# Patient Record
Sex: FEMALE | Race: WHITE | NOT HISPANIC OR LATINO | Employment: OTHER | URBAN - NONMETROPOLITAN AREA
[De-identification: names, ages, dates, MRNs, and addresses within clinical notes are randomized per-mention and may not be internally consistent; named-entity substitution may affect disease eponyms.]

---

## 2023-05-08 ENCOUNTER — OFFICE VISIT (OUTPATIENT)
Dept: PRIMARY CARE | Facility: CLINIC | Age: 88
End: 2023-05-08
Payer: MEDICARE

## 2023-05-08 VITALS
SYSTOLIC BLOOD PRESSURE: 124 MMHG | WEIGHT: 191 LBS | HEART RATE: 78 BPM | BODY MASS INDEX: 29.04 KG/M2 | OXYGEN SATURATION: 97 % | DIASTOLIC BLOOD PRESSURE: 82 MMHG

## 2023-05-08 DIAGNOSIS — M48.061 SPINAL STENOSIS OF LUMBAR REGION WITHOUT NEUROGENIC CLAUDICATION: ICD-10-CM

## 2023-05-08 DIAGNOSIS — G60.9 IDIOPATHIC PERIPHERAL NEUROPATHY: ICD-10-CM

## 2023-05-08 DIAGNOSIS — I05.1 RHEUMATIC MITRAL REGURGITATION: ICD-10-CM

## 2023-05-08 DIAGNOSIS — G45.9 TIA (TRANSIENT ISCHEMIC ATTACK): ICD-10-CM

## 2023-05-08 DIAGNOSIS — D64.9 ANEMIA, UNSPECIFIED TYPE: ICD-10-CM

## 2023-05-08 DIAGNOSIS — I10 BENIGN ESSENTIAL HYPERTENSION: ICD-10-CM

## 2023-05-08 DIAGNOSIS — I25.10 CORONARY ARTERY DISEASE INVOLVING NATIVE CORONARY ARTERY OF NATIVE HEART WITHOUT ANGINA PECTORIS: ICD-10-CM

## 2023-05-08 DIAGNOSIS — I48.0 PAROXYSMAL ATRIAL FIBRILLATION (MULTI): ICD-10-CM

## 2023-05-08 DIAGNOSIS — K21.9 GASTROESOPHAGEAL REFLUX DISEASE WITHOUT ESOPHAGITIS: ICD-10-CM

## 2023-05-08 DIAGNOSIS — M54.12 CERVICAL RADICULAR PAIN: ICD-10-CM

## 2023-05-08 DIAGNOSIS — M79.674 PAIN OF TOE OF RIGHT FOOT: ICD-10-CM

## 2023-05-08 DIAGNOSIS — E11.9 DIABETES MELLITUS TYPE 2, NONINSULIN DEPENDENT (MULTI): Primary | ICD-10-CM

## 2023-05-08 PROBLEM — I34.0 MITRAL REGURGITATION: Status: ACTIVE | Noted: 2023-05-08

## 2023-05-08 PROBLEM — J30.9 ALLERGIC RHINITIS: Status: ACTIVE | Noted: 2023-05-08

## 2023-05-08 PROBLEM — R55 SYNCOPE: Status: RESOLVED | Noted: 2023-05-08 | Resolved: 2023-05-08

## 2023-05-08 PROBLEM — L84 HARD CORN: Status: RESOLVED | Noted: 2023-05-08 | Resolved: 2023-05-08

## 2023-05-08 PROBLEM — M17.11 ARTHRITIS OF KNEE, RIGHT: Status: ACTIVE | Noted: 2023-05-08

## 2023-05-08 PROBLEM — I74.2: Status: RESOLVED | Noted: 2023-05-08 | Resolved: 2023-05-08

## 2023-05-08 PROBLEM — N39.0 URINARY TRACT INFECTION: Status: RESOLVED | Noted: 2023-05-08 | Resolved: 2023-05-08

## 2023-05-08 PROBLEM — M47.816 DEGENERATIVE JOINT DISEASE (DJD) OF LUMBAR SPINE: Status: ACTIVE | Noted: 2023-05-08

## 2023-05-08 PROBLEM — B02.9 SHINGLES: Status: RESOLVED | Noted: 2023-05-08 | Resolved: 2023-05-08

## 2023-05-08 PROBLEM — Z20.822 SUSPECTED COVID-19 VIRUS INFECTION: Status: RESOLVED | Noted: 2023-05-08 | Resolved: 2023-05-08

## 2023-05-08 PROBLEM — M19.012 PRIMARY OSTEOARTHRITIS OF LEFT SHOULDER: Status: ACTIVE | Noted: 2023-05-08

## 2023-05-08 PROBLEM — M54.50 LUMBAR PAIN: Status: ACTIVE | Noted: 2023-05-08

## 2023-05-08 PROBLEM — G62.9 PERIPHERAL NEUROPATHY: Status: ACTIVE | Noted: 2023-05-08

## 2023-05-08 PROBLEM — R32 URINARY INCONTINENCE: Status: ACTIVE | Noted: 2023-05-08

## 2023-05-08 PROBLEM — E83.51 HYPOCALCEMIA: Status: ACTIVE | Noted: 2023-05-08

## 2023-05-08 PROBLEM — R73.09 ELEVATED GLUCOSE: Status: ACTIVE | Noted: 2023-05-08

## 2023-05-08 LAB
ALBUMIN (G/DL) IN SER/PLAS: 3.6 G/DL (ref 3.4–5)
ANION GAP IN SER/PLAS: 13 MMOL/L (ref 10–20)
CALCIUM (MG/DL) IN SER/PLAS: 8.9 MG/DL (ref 8.6–10.3)
CARBON DIOXIDE, TOTAL (MMOL/L) IN SER/PLAS: 29 MMOL/L (ref 21–32)
CHLORIDE (MMOL/L) IN SER/PLAS: 101 MMOL/L (ref 98–107)
CHOLESTEROL (MG/DL) IN SER/PLAS: 99 MG/DL (ref 0–199)
CHOLESTEROL IN HDL (MG/DL) IN SER/PLAS: 46.7 MG/DL
CHOLESTEROL/HDL RATIO: 2.1
CREATININE (MG/DL) IN SER/PLAS: 1.23 MG/DL (ref 0.5–1.05)
GFR FEMALE: 41 ML/MIN/1.73M2
GLUCOSE (MG/DL) IN SER/PLAS: 222 MG/DL (ref 74–99)
LDL: 18 MG/DL (ref 0–99)
PHOSPHATE (MG/DL) IN SER/PLAS: 3.9 MG/DL (ref 2.5–4.9)
POC HEMOGLOBIN A1C: 8.8 % (ref 4.2–6.5)
POTASSIUM (MMOL/L) IN SER/PLAS: 4.7 MMOL/L (ref 3.5–5.3)
SODIUM (MMOL/L) IN SER/PLAS: 138 MMOL/L (ref 136–145)
TRIGLYCERIDE (MG/DL) IN SER/PLAS: 172 MG/DL (ref 0–149)
UREA NITROGEN (MG/DL) IN SER/PLAS: 27 MG/DL (ref 6–23)
VLDL: 34 MG/DL (ref 0–40)

## 2023-05-08 PROCEDURE — 1160F RVW MEDS BY RX/DR IN RCRD: CPT | Performed by: FAMILY MEDICINE

## 2023-05-08 PROCEDURE — G0439 PPPS, SUBSEQ VISIT: HCPCS | Performed by: FAMILY MEDICINE

## 2023-05-08 PROCEDURE — 3079F DIAST BP 80-89 MM HG: CPT | Performed by: FAMILY MEDICINE

## 2023-05-08 PROCEDURE — 99214 OFFICE O/P EST MOD 30 MIN: CPT | Performed by: FAMILY MEDICINE

## 2023-05-08 PROCEDURE — 82570 ASSAY OF URINE CREATININE: CPT

## 2023-05-08 PROCEDURE — 1036F TOBACCO NON-USER: CPT | Performed by: FAMILY MEDICINE

## 2023-05-08 PROCEDURE — 80061 LIPID PANEL: CPT

## 2023-05-08 PROCEDURE — 1157F ADVNC CARE PLAN IN RCRD: CPT | Performed by: FAMILY MEDICINE

## 2023-05-08 PROCEDURE — 82043 UR ALBUMIN QUANTITATIVE: CPT

## 2023-05-08 PROCEDURE — 1159F MED LIST DOCD IN RCRD: CPT | Performed by: FAMILY MEDICINE

## 2023-05-08 PROCEDURE — 1170F FXNL STATUS ASSESSED: CPT | Performed by: FAMILY MEDICINE

## 2023-05-08 PROCEDURE — 3074F SYST BP LT 130 MM HG: CPT | Performed by: FAMILY MEDICINE

## 2023-05-08 PROCEDURE — 83036 HEMOGLOBIN GLYCOSYLATED A1C: CPT | Performed by: FAMILY MEDICINE

## 2023-05-08 PROCEDURE — 80069 RENAL FUNCTION PANEL: CPT

## 2023-05-08 PROCEDURE — 82607 VITAMIN B-12: CPT

## 2023-05-08 PROCEDURE — 84550 ASSAY OF BLOOD/URIC ACID: CPT

## 2023-05-08 RX ORDER — CLOPIDOGREL BISULFATE 75 MG/1
1 TABLET ORAL DAILY
COMMUNITY
Start: 2014-05-27 | End: 2023-06-02 | Stop reason: SDUPTHER

## 2023-05-08 RX ORDER — ACETAMINOPHEN 500 MG
2 TABLET ORAL 2 TIMES DAILY
COMMUNITY
Start: 2022-06-25

## 2023-05-08 RX ORDER — GABAPENTIN 300 MG/1
1 CAPSULE ORAL EVERY 6 HOURS
COMMUNITY
Start: 2012-04-02 | End: 2023-07-12

## 2023-05-08 RX ORDER — CHOLECALCIFEROL (VITAMIN D3) 50 MCG
1 TABLET ORAL DAILY
COMMUNITY

## 2023-05-08 RX ORDER — ROSUVASTATIN CALCIUM 10 MG/1
1 TABLET, COATED ORAL NIGHTLY
COMMUNITY
Start: 2019-04-18 | End: 2023-05-30

## 2023-05-08 RX ORDER — NITROGLYCERIN 0.4 MG/1
1 TABLET SUBLINGUAL EVERY 5 MIN PRN
COMMUNITY
End: 2023-05-08 | Stop reason: SDUPTHER

## 2023-05-08 RX ORDER — NITROGLYCERIN 0.4 MG/1
0.4 TABLET SUBLINGUAL EVERY 5 MIN PRN
Qty: 90 TABLET | Refills: 1 | Status: SHIPPED | OUTPATIENT
Start: 2023-05-08 | End: 2023-05-23

## 2023-05-08 RX ORDER — CARVEDILOL 3.12 MG/1
1 TABLET ORAL
COMMUNITY
Start: 2011-11-08 | End: 2023-06-02 | Stop reason: SDUPTHER

## 2023-05-08 RX ORDER — NYSTATIN 100000 U/G
CREAM TOPICAL AS NEEDED
COMMUNITY

## 2023-05-08 RX ORDER — APIXABAN 5 MG/1
1 TABLET, FILM COATED ORAL EVERY 12 HOURS
COMMUNITY
Start: 2020-08-03 | End: 2023-05-23

## 2023-05-08 ASSESSMENT — PATIENT HEALTH QUESTIONNAIRE - PHQ9
2. FEELING DOWN, DEPRESSED OR HOPELESS: NOT AT ALL
SUM OF ALL RESPONSES TO PHQ9 QUESTIONS 1 AND 2: 0
1. LITTLE INTEREST OR PLEASURE IN DOING THINGS: NOT AT ALL

## 2023-05-08 ASSESSMENT — ACTIVITIES OF DAILY LIVING (ADL)
TAKING_MEDICATION: INDEPENDENT
DRESSING: INDEPENDENT
MANAGING_FINANCES: INDEPENDENT
DOING_HOUSEWORK: NEEDS ASSISTANCE
BATHING: INDEPENDENT
GROCERY_SHOPPING: NEEDS ASSISTANCE

## 2023-05-08 NOTE — PROGRESS NOTES
Subjective   Reason for Visit: Andreia Camejo is an 89 y.o. female here for a Medicare Wellness visit.     Past Medical, Surgical, and Family History reviewed and updated in chart.    Reviewed all medications by prescribing practitioner or clinical pharmacist (such as prescriptions, OTCs, herbal therapies and supplements) and documented in the medical record.    HPI for 6 month check up and wellness  Toe issue right great toe  Often has night pain  Is not the same every single night  Does not notice it during the day  For the most part feeling well other than usual pains particularly in back  He is not able to perform any activities  No chest pain pressure palpitations  Has not used nitroglycerin of note her nitroglycerin are all   Discussed and encouraged to obtain a new bottle to have in case    Patient Care Team:  Errol Murphy DO as PCP - General  Errol Murphy DO as PCP - [facility] Attributed Provider   R great toe pain at HS  Better day     Review of Systems    Objective   Vitals:  /82   Pulse 78   Wt 86.6 kg (191 lb)   SpO2 97%   BMI 29.04 kg/m²       Physical Exam  Cardiovascular:      Pulses:           Dorsalis pedis pulses are 1+ on the right side and 1+ on the left side.        Posterior tibial pulses are 1+ on the right side and 1+ on the left side.   Musculoskeletal:      Right foot: Normal range of motion. No foot drop or prominent metatarsal heads.      Left foot: Normal range of motion. No foot drop or prominent metatarsal heads.   Feet:      Right foot:      Protective Sensation: 6 sites tested.  4 sites sensed.      Skin integrity: Callus present. No ulcer, blister or skin breakdown.      Toenail Condition: Right toenails are abnormally thick.      Left foot:      Protective Sensation: 6 sites tested.  3 sites sensed.      Skin integrity: Callus present. No ulcer, blister or skin breakdown.      Toenail Condition: Left toenails are abnormally thick.       General: alert, no  apparent distress, good hygiene, wheeled walker used to get her in and out of the room  HEAD: Normocephalic, atraumatic   EARS: EAC patent, TMs normal,   EYES: sclera white, MAULIK, conjunctiva noninjected  NOSE/MOUTH: grade 3 airway  Neck: supple, no masses, thyroid non enlarged non nodular, no cervical adenopathy,  Lungs: no wheezing, no rales , no rhonchi, normal respiratory pattern, breath sounds not diminished  Heart: regular rate and rhythm, 3/6 RUSB murmur and 2/6 apex, no ectopy, no S3 or S4, no carotid bruits  abdomen: soft NT,BS + , no organomegaly, no masses, no bruits  Extremities: trace edema, no cyanosis, no clubbing, 1+ posterior tibialis pulse  Plantar aspect right foot she has 2-3  hard corns around base second- third toes metatarsal head area, bony prominence palpated and foot in this are     Assessment/Plan   Problem List Items Addressed This Visit       Anemia    Benign essential hypertension    Cervical radicular pain    Coronary artery disease    Relevant Medications    carvedilol (Coreg) 3.125 mg tablet    clopidogrel (Plavix) 75 mg tablet    nitroglycerin (Nitrostat) 0.4 mg SL tablet    Esophageal reflux    Mitral regurgitation    Relevant Medications    carvedilol (Coreg) 3.125 mg tablet    clopidogrel (Plavix) 75 mg tablet    nitroglycerin (Nitrostat) 0.4 mg SL tablet    Paroxysmal atrial fibrillation (CMS/HCC)    Relevant Medications    carvedilol (Coreg) 3.125 mg tablet    clopidogrel (Plavix) 75 mg tablet    nitroglycerin (Nitrostat) 0.4 mg SL tablet    Peripheral neuropathy    Relevant Orders    Vitamin B12    Spinal stenosis, lumbar    TIA (transient ischemic attack)     Other Visit Diagnoses       Diabetes mellitus type 2, noninsulin dependent (CMS/HCC)    -  Primary    Relevant Orders    POCT glycosylated hemoglobin (Hb A1C) manually resulted (Completed)    Renal Function Panel (Completed)    Lipid Panel (Completed)    Albumin , Urine Random    Pain of toe of right foot         Relevant Orders    Uric Acid        We will wait lab work to see where hemoglobin A1c is,  May need to reconsider starting very low-dose insulin she had used Lantus in the past.  Possible use of oral agent uncertain how patient's medication coverages.  Significant CKD could consider SGLT2 inhibitor

## 2023-05-09 LAB
ALBUMIN (MG/L) IN URINE: 33.9 MG/L
ALBUMIN/CREATININE (UG/MG) IN URINE: 37.2 UG/MG CRT (ref 0–30)
CREATININE (MG/DL) IN URINE: 91.2 MG/DL (ref 20–320)
URATE (MG/DL) IN SER/PLAS: 5.1 MG/DL (ref 2.3–6.7)

## 2023-05-11 LAB — COBALAMIN (VITAMIN B12) (PG/ML) IN SER/PLAS: 314 PG/ML (ref 211–911)

## 2023-05-22 DIAGNOSIS — I25.10 CORONARY ARTERY DISEASE INVOLVING NATIVE CORONARY ARTERY OF NATIVE HEART WITHOUT ANGINA PECTORIS: ICD-10-CM

## 2023-05-23 DIAGNOSIS — I48.0 PAROXYSMAL ATRIAL FIBRILLATION (MULTI): Primary | ICD-10-CM

## 2023-05-23 RX ORDER — NITROGLYCERIN 0.4 MG/1
TABLET SUBLINGUAL
Qty: 100 TABLET | Refills: 1 | Status: SHIPPED | OUTPATIENT
Start: 2023-05-23 | End: 2023-06-22

## 2023-05-30 DIAGNOSIS — I25.10 CORONARY ARTERY DISEASE INVOLVING NATIVE CORONARY ARTERY OF NATIVE HEART WITHOUT ANGINA PECTORIS: Primary | ICD-10-CM

## 2023-05-30 RX ORDER — ROSUVASTATIN CALCIUM 10 MG/1
TABLET, COATED ORAL
Qty: 90 TABLET | Refills: 0 | Status: SHIPPED | OUTPATIENT
Start: 2023-05-30 | End: 2023-11-15 | Stop reason: SDUPTHER

## 2023-06-02 DIAGNOSIS — I25.10 CORONARY ARTERY DISEASE INVOLVING NATIVE CORONARY ARTERY OF NATIVE HEART WITHOUT ANGINA PECTORIS: Primary | ICD-10-CM

## 2023-06-02 RX ORDER — CARVEDILOL 3.12 MG/1
TABLET ORAL
Qty: 180 TABLET | Refills: 1 | Status: SHIPPED | OUTPATIENT
Start: 2023-06-02 | End: 2023-11-15 | Stop reason: SDUPTHER

## 2023-06-02 RX ORDER — CLOPIDOGREL BISULFATE 75 MG/1
75 TABLET ORAL DAILY
Qty: 90 TABLET | Refills: 1 | Status: SHIPPED | OUTPATIENT
Start: 2023-06-02 | End: 2023-11-15 | Stop reason: SDUPTHER

## 2023-07-12 DIAGNOSIS — G60.9 IDIOPATHIC PERIPHERAL NEUROPATHY: ICD-10-CM

## 2023-07-12 DIAGNOSIS — M54.12 CERVICAL RADICULAR PAIN: Primary | ICD-10-CM

## 2023-07-12 RX ORDER — GABAPENTIN 300 MG/1
CAPSULE ORAL
Qty: 360 CAPSULE | Refills: 1 | Status: SHIPPED | OUTPATIENT
Start: 2023-07-12 | End: 2023-11-15 | Stop reason: SDUPTHER

## 2023-08-30 ENCOUNTER — PATIENT OUTREACH (OUTPATIENT)
Dept: CARE COORDINATION | Facility: CLINIC | Age: 88
End: 2023-08-30
Payer: MEDICARE

## 2023-08-30 SDOH — ECONOMIC STABILITY: FOOD INSECURITY
ARE ANY OF YOUR NEEDS URGENT? FOR EXAMPLE, UNCERTAINTY OF WHERE YOU WILL GET YOUR NEXT MEAL OR NOT HAVING THE MEDICATIONS YOU NEED TO TAKE TOMORROW.: NO

## 2023-08-30 SDOH — ECONOMIC STABILITY: GENERAL: WOULD YOU LIKE HELP WITH ANY OF THE FOLLOWING NEEDS?: I DONT NEED HELP WITH ANY OF THESE

## 2023-08-30 ASSESSMENT — ENCOUNTER SYMPTOMS
LOSS OF SENSATION IN FEET: 0
DEPRESSION: 0
OCCASIONAL FEELINGS OF UNSTEADINESS: 0

## 2023-08-30 NOTE — PROGRESS NOTES
Outreach call to patient to support a smooth transition of care from recent admission.  Spoke with patient, reviewed discharge medications, discharge instructions, assessed social needs, and provided education on importance of follow-up appointment with provider. Will continue to monitor through transition period.     Engagement  Call Start Time: 1045 (8/30/2023 10:59 AM)    Medications  Medications reviewed with patient/caregiver?: Yes (8/30/2023 10:59 AM)  Is the patient having any side effects they believe may be caused by any medication additions or changes?: No (8/30/2023 10:59 AM)  Does the patient have all medications ordered at discharge?: Yes (8/30/2023 10:59 AM)  Care Management Interventions: Provided patient education (8/30/2023 10:59 AM)  Is the patient taking all medications as directed (includes completed medication regime)?: Yes (8/30/2023 10:59 AM)  Care Management Interventions: Provided patient education (8/30/2023 10:59 AM)    Appointments  Does the patient have a primary care provider?: Yes (8/30/2023 10:59 AM)  Care Management Interventions: Verified appointment date/time/provider (8/30/2023 10:59 AM)  Has the patient kept scheduled appointments due by today?: Yes (8/30/2023 10:59 AM)  Care Management Interventions: Advised patient to keep appointment (8/30/2023 10:59 AM)    Self Management  What is the home health agency?: [facility] (8/30/2023 10:59 AM)  Has home health visited the patient within 72 hours of discharge?: Yes (8/30/2023 10:59 AM)    Patient Teaching  Does the patient have access to their discharge instructions?: Yes (8/30/2023 10:59 AM)  Care Management Interventions: Reviewed instructions with patient (Diet:  · Diet resume normal diet    Labs 1:  · Lab Test(s) Basic Metabolic Panel  · Date To Be Drawn recheck in 1 week) (8/30/2023 10:59 AM)  What is the patient's perception of their health status since discharge?: Improving (8/30/2023 10:59 AM)  Is the patient/caregiver able to  teach back the hierarchy of who to call/visit for symptoms/problems? PCP, Specialist, Home Health nurse, Urgent Care, ED, 911: Yes (Patient's cousin is an RN, is at home with patient.) (8/30/2023 10:59 AM)      LORI

## 2023-09-07 ENCOUNTER — OFFICE VISIT (OUTPATIENT)
Dept: PRIMARY CARE | Facility: CLINIC | Age: 88
End: 2023-09-07
Payer: MEDICARE

## 2023-09-07 VITALS
HEART RATE: 61 BPM | WEIGHT: 187 LBS | TEMPERATURE: 96.2 F | DIASTOLIC BLOOD PRESSURE: 67 MMHG | BODY MASS INDEX: 28.43 KG/M2 | SYSTOLIC BLOOD PRESSURE: 136 MMHG | OXYGEN SATURATION: 98 %

## 2023-09-07 DIAGNOSIS — R40.4 TRANSIENT ALTERATION OF AWARENESS: Primary | ICD-10-CM

## 2023-09-07 DIAGNOSIS — N39.0 URINARY TRACT INFECTION WITHOUT HEMATURIA, SITE UNSPECIFIED: ICD-10-CM

## 2023-09-07 DIAGNOSIS — H61.22 IMPACTED CERUMEN OF LEFT EAR: ICD-10-CM

## 2023-09-07 DIAGNOSIS — N30.00 ACUTE CYSTITIS WITHOUT HEMATURIA: ICD-10-CM

## 2023-09-07 LAB
POC APPEARANCE, URINE: CLEAR
POC BILIRUBIN, URINE: NEGATIVE
POC BLOOD, URINE: NEGATIVE
POC COLOR, URINE: YELLOW
POC GLUCOSE, URINE: NEGATIVE MG/DL
POC KETONES, URINE: NEGATIVE MG/DL
POC LEUKOCYTES, URINE: NEGATIVE
POC NITRITE,URINE: NEGATIVE
POC PH, URINE: 7 PH
POC PROTEIN, URINE: NEGATIVE MG/DL
POC SPECIFIC GRAVITY, URINE: 1.01
POC UROBILINOGEN, URINE: 0.2 EU/DL

## 2023-09-07 PROCEDURE — 1036F TOBACCO NON-USER: CPT | Performed by: FAMILY MEDICINE

## 2023-09-07 PROCEDURE — 3075F SYST BP GE 130 - 139MM HG: CPT | Performed by: FAMILY MEDICINE

## 2023-09-07 PROCEDURE — 99214 OFFICE O/P EST MOD 30 MIN: CPT | Performed by: FAMILY MEDICINE

## 2023-09-07 PROCEDURE — 1160F RVW MEDS BY RX/DR IN RCRD: CPT | Performed by: FAMILY MEDICINE

## 2023-09-07 PROCEDURE — 1159F MED LIST DOCD IN RCRD: CPT | Performed by: FAMILY MEDICINE

## 2023-09-07 PROCEDURE — 1157F ADVNC CARE PLAN IN RCRD: CPT | Performed by: FAMILY MEDICINE

## 2023-09-07 PROCEDURE — 3078F DIAST BP <80 MM HG: CPT | Performed by: FAMILY MEDICINE

## 2023-09-07 PROCEDURE — 81003 URINALYSIS AUTO W/O SCOPE: CPT | Performed by: FAMILY MEDICINE

## 2023-09-07 ASSESSMENT — PATIENT HEALTH QUESTIONNAIRE - PHQ9
1. LITTLE INTEREST OR PLEASURE IN DOING THINGS: NOT AT ALL
SUM OF ALL RESPONSES TO PHQ9 QUESTIONS 1 AND 2: 0
2. FEELING DOWN, DEPRESSED OR HOPELESS: NOT AT ALL

## 2023-09-07 NOTE — PROGRESS NOTES
Subjective   Patient ID: Andreia Camejo is a 89 y.o. female who presents for Hospital Follow-up (In UTI and TIA, per Pt feels ok, except dizziness).    HPI   Patient's daughter came over apparently she was in the shower cleaning  Was very lethargic not herself  [place] to be from UTI  Was light headed almost passed out EMT called failed stroke test taken to er in hospital 2 nights  Tx UTI   Having vertigo issues since yesterday  Has had this problem before  Does have hearing loss left ear more so than right  Feels back to mentation level she was prior per patient and daughter  He is starting home physical therapy  He is somewhat weaker    Review of Systems    Objective   /67   Pulse 61   Temp 35.7 °C (96.2 °F)   Wt 84.8 kg (187 lb)   SpO2 98%   BMI 28.43 kg/m²     Physical Exam  General: alert, no apparent distress, good hygiene   HEAD:  Normocephalic, atraumatic    EARS:  EAC left 100% occluded with cerumen deep in canal by TM, TMs normal post lavage,   EYES:  sclera white, MAULIK, conjunctiva noninjected  NOSE: Nasal passages patent   MOUTH: Pharynx clear, tongue uvula midline, grade 3 airway  Neck:  supple, no masses, thyroid non enlarged non nodular, no cervical adenopathy,  Lungs:  no wheezing, no rales , no rhonchi, normal respiratory pattern, breath sounds not diminished  Heart:  regular rate and  rhythm, 3/6 RUSB murmur, no ectopy, no S3 or S4, no carotid bruits  Abdomen:  soft NT,BS + ,  no organomegaly, no masses, no bruits  Extremities:  no edema, no cyanosis, no clubbing,  2+ posterior tibialis pulse    Psych:  speech fluent, normal affect, normal thought process  Skin:  no rashes, no concerning skin lesions, normal texture    Assessment/Plan   Problem List Items Addressed This Visit       Impacted cerumen of left ear    Transient alteration of awareness - Primary    Urinary tract infection without hematuria    Relevant Orders    POCT UA Automated manually resulted (Completed)    POCT UA  (nonautomated) manually resulted   Hospitals or records were reviewed as far as CT of brain lab work urine culture.  She is uncertain what antibiotic she took felt it started with then a concern possible amoxicillin.  Urinalysis checked is totally clear today,  Discussed with daughter and patient urinary tract infection can sometimes cause mental confusion most likely not a TIA however patient is already on Eliquis and clopidogrel basic treatment for GI if it occurred.  They definitely prefer not to do MRI particularly if it have to be downtown.  Doubt it would change treatment  We will continue medicines as is.  Complete physical therapy/  He has appointment with spine doctor at the end of the month she is having neck issues.  She feels vertigo/neck issues are related.  Most likely this is BPV.  Discussed vertiginous adaption exercises  Discussed need for follow-up if it does not slowly improve.  Hearing improved significantly after ear lavage.

## 2023-09-21 ENCOUNTER — PATIENT OUTREACH (OUTPATIENT)
Dept: CARE COORDINATION | Facility: CLINIC | Age: 88
End: 2023-09-21
Payer: MEDICARE

## 2023-09-25 PROCEDURE — G0180 MD CERTIFICATION HHA PATIENT: HCPCS | Performed by: FAMILY MEDICINE

## 2023-10-02 ENCOUNTER — PATIENT OUTREACH (OUTPATIENT)
Dept: CARE COORDINATION | Facility: CLINIC | Age: 88
End: 2023-10-02
Payer: MEDICARE

## 2023-10-02 NOTE — PROGRESS NOTES
Outreach call to patient to check in 30 days after hospital discharge to support smooth transition of care.  Patient with no additional needs noted. No additional outreach needed at this time.    Patient has a follow up with Dr. Murphy confirmed appointment and has transportation arranged  for 11/15/2023.      LORI

## 2023-11-15 ENCOUNTER — OFFICE VISIT (OUTPATIENT)
Dept: PRIMARY CARE | Facility: CLINIC | Age: 88
End: 2023-11-15
Payer: MEDICARE

## 2023-11-15 VITALS
OXYGEN SATURATION: 97 % | BODY MASS INDEX: 28.43 KG/M2 | WEIGHT: 187 LBS | SYSTOLIC BLOOD PRESSURE: 141 MMHG | HEART RATE: 62 BPM | DIASTOLIC BLOOD PRESSURE: 82 MMHG

## 2023-11-15 DIAGNOSIS — I10 BENIGN ESSENTIAL HYPERTENSION: Primary | ICD-10-CM

## 2023-11-15 DIAGNOSIS — D69.6 THROMBOCYTOPENIA (CMS-HCC): ICD-10-CM

## 2023-11-15 DIAGNOSIS — M54.12 CERVICAL RADICULAR PAIN: ICD-10-CM

## 2023-11-15 DIAGNOSIS — E11.9 DIABETES MELLITUS TYPE 2 WITHOUT RETINOPATHY (MULTI): ICD-10-CM

## 2023-11-15 DIAGNOSIS — I48.0 PAROXYSMAL ATRIAL FIBRILLATION (MULTI): ICD-10-CM

## 2023-11-15 DIAGNOSIS — G60.9 IDIOPATHIC PERIPHERAL NEUROPATHY: ICD-10-CM

## 2023-11-15 DIAGNOSIS — I25.10 CORONARY ARTERY DISEASE INVOLVING NATIVE CORONARY ARTERY OF NATIVE HEART WITHOUT ANGINA PECTORIS: ICD-10-CM

## 2023-11-15 LAB
ALBUMIN SERPL BCP-MCNC: 4 G/DL (ref 3.4–5)
ANION GAP SERPL CALC-SCNC: 12 MMOL/L (ref 10–20)
BASOPHILS # BLD AUTO: 0.05 X10*3/UL (ref 0–0.1)
BASOPHILS NFR BLD AUTO: 0.7 %
BUN SERPL-MCNC: 30 MG/DL (ref 6–23)
CALCIUM SERPL-MCNC: 8.7 MG/DL (ref 8.6–10.3)
CHLORIDE SERPL-SCNC: 101 MMOL/L (ref 98–107)
CO2 SERPL-SCNC: 30 MMOL/L (ref 21–32)
CREAT SERPL-MCNC: 1.22 MG/DL (ref 0.5–1.05)
EOSINOPHIL # BLD AUTO: 0.11 X10*3/UL (ref 0–0.4)
EOSINOPHIL NFR BLD AUTO: 1.5 %
ERYTHROCYTE [DISTWIDTH] IN BLOOD BY AUTOMATED COUNT: 12.5 % (ref 11.5–14.5)
GFR SERPL CREATININE-BSD FRML MDRD: 41 ML/MIN/1.73M*2
GLUCOSE SERPL-MCNC: 161 MG/DL (ref 74–99)
HCT VFR BLD AUTO: 41.8 % (ref 36–46)
HGB BLD-MCNC: 13.6 G/DL (ref 12–16)
IMM GRANULOCYTES # BLD AUTO: 0.02 X10*3/UL (ref 0–0.5)
IMM GRANULOCYTES NFR BLD AUTO: 0.3 % (ref 0–0.9)
LYMPHOCYTES # BLD AUTO: 1.52 X10*3/UL (ref 0.8–3)
LYMPHOCYTES NFR BLD AUTO: 20.6 %
MCH RBC QN AUTO: 33.3 PG (ref 26–34)
MCHC RBC AUTO-ENTMCNC: 32.5 G/DL (ref 32–36)
MCV RBC AUTO: 102 FL (ref 80–100)
MONOCYTES # BLD AUTO: 1.02 X10*3/UL (ref 0.05–0.8)
MONOCYTES NFR BLD AUTO: 13.8 %
NEUTROPHILS # BLD AUTO: 4.65 X10*3/UL (ref 1.6–5.5)
NEUTROPHILS NFR BLD AUTO: 63.1 %
NRBC BLD-RTO: 0 /100 WBCS (ref 0–0)
PHOSPHATE SERPL-MCNC: 3.9 MG/DL (ref 2.5–4.9)
PLATELET # BLD AUTO: 155 X10*3/UL (ref 150–450)
POC HEMOGLOBIN A1C: 8.3 % (ref 4.2–6.5)
POTASSIUM SERPL-SCNC: 5.4 MMOL/L (ref 3.5–5.3)
RBC # BLD AUTO: 4.09 X10*6/UL (ref 4–5.2)
SODIUM SERPL-SCNC: 138 MMOL/L (ref 136–145)
WBC # BLD AUTO: 7.4 X10*3/UL (ref 4.4–11.3)

## 2023-11-15 PROCEDURE — 36415 COLL VENOUS BLD VENIPUNCTURE: CPT

## 2023-11-15 PROCEDURE — 3077F SYST BP >= 140 MM HG: CPT | Performed by: FAMILY MEDICINE

## 2023-11-15 PROCEDURE — 1160F RVW MEDS BY RX/DR IN RCRD: CPT | Performed by: FAMILY MEDICINE

## 2023-11-15 PROCEDURE — 3079F DIAST BP 80-89 MM HG: CPT | Performed by: FAMILY MEDICINE

## 2023-11-15 PROCEDURE — 99214 OFFICE O/P EST MOD 30 MIN: CPT | Performed by: FAMILY MEDICINE

## 2023-11-15 PROCEDURE — 83036 HEMOGLOBIN GLYCOSYLATED A1C: CPT | Performed by: FAMILY MEDICINE

## 2023-11-15 PROCEDURE — 1159F MED LIST DOCD IN RCRD: CPT | Performed by: FAMILY MEDICINE

## 2023-11-15 PROCEDURE — 80069 RENAL FUNCTION PANEL: CPT

## 2023-11-15 PROCEDURE — 85025 COMPLETE CBC W/AUTO DIFF WBC: CPT

## 2023-11-15 PROCEDURE — 1036F TOBACCO NON-USER: CPT | Performed by: FAMILY MEDICINE

## 2023-11-15 RX ORDER — CLOPIDOGREL BISULFATE 75 MG/1
75 TABLET ORAL DAILY
Qty: 90 TABLET | Refills: 1 | Status: SHIPPED | OUTPATIENT
Start: 2023-11-15 | End: 2024-05-28

## 2023-11-15 RX ORDER — GABAPENTIN 300 MG/1
CAPSULE ORAL
Qty: 360 CAPSULE | Refills: 1 | Status: SHIPPED | OUTPATIENT
Start: 2023-11-15

## 2023-11-15 RX ORDER — CARVEDILOL 3.12 MG/1
3.12 TABLET ORAL
Qty: 180 TABLET | Refills: 1 | Status: SHIPPED | OUTPATIENT
Start: 2023-11-15 | End: 2024-05-28

## 2023-11-15 RX ORDER — ROSUVASTATIN CALCIUM 10 MG/1
10 TABLET, COATED ORAL NIGHTLY
Qty: 90 TABLET | Refills: 1 | Status: SHIPPED | OUTPATIENT
Start: 2023-11-15 | End: 2024-04-15

## 2023-11-15 NOTE — PROGRESS NOTES
Subjective   Reason for Visit: Andreia Camejo is an 89 y.o. female here for a Medicare Wellness visit.     Past Medical, Surgical, and Family History reviewed and updated in chart.    Reviewed all medications by prescribing practitioner or clinical pharmacist (such as prescriptions, OTCs, herbal therapies and supplements) and documented in the medical record.    HPI  6 month check up      Patient Care Team:  Errol Murphy DO as PCP - General  Errol Murphy DO as PCP - [facility]     Review of Systems    Objective   Vitals:  /82   Pulse 62   Wt 84.8 kg (187 lb)   SpO2 97%   BMI 28.43 kg/m²       Physical Exam    Assessment/Plan   Problem List Items Addressed This Visit    None

## 2023-11-16 NOTE — PROGRESS NOTES
Subjective   Patient ID: Andreia Camejo is a 89 y.o. female who presents for Hypertension.    HPI   Patient for 6-month checkup regarding diabetes hypertension history of coronary artery disease  Paroxysmal atrial fibrillation on Eliquis history of CKD  Lumbar degenerative disc disease with spinal canal stenosis  History of TIA  Overall doing fairly well considering her age  Daughter does help her out    Review of Systems    Objective   /82   Pulse 62   Wt 84.8 kg (187 lb)   SpO2 97%   BMI 28.43 kg/m²     Physical Exam  General: alert, no apparent distress, good hygiene   HEAD:  Normocephalic, atraumatic    EARS:  EACs patent  TMs normal post lavage,   EYES:  sclera white, MAULIK, conjunctiva noninjected  NOSE: Nasal passages patent   MOUTH: Pharynx clear, tongue uvula midline, grade 3 airway  Neck:  supple, no masses, thyroid non enlarged non nodular, no cervical adenopathy,  Lungs:  no wheezing, scant right base questionably dry rales , no rhonchi, normal respiratory pattern, breath sounds not diminished  Heart:  regular rate and  rhythm, 3/6 RUSB murmur, no ectopy, no S3 or S4, no carotid bruits  Abdomen:  soft NT,BS + ,  no organomegaly, no masses, no bruits  Extremities:  no edema, no cyanosis, no clubbing,  1+ posterior tibialis pulse    Psych:  speech fluent, normal affect, normal thought process  Skin:  no rashes, no concerning skin lesions, normal texture  Patient with few hard corns plantar aspect right foot these were shaved with #15 blade  Tolerated very well no blood loss         Assessment/Plan   Problem List Items Addressed This Visit             ICD-10-CM    Benign essential hypertension - Primary I10    Relevant Orders    Renal Function Panel (Completed)    Cervical radicular pain M54.12    Relevant Medications    gabapentin (Neurontin) 300 mg capsule    Coronary artery disease I25.10    Relevant Medications    carvedilol (Coreg) 3.125 mg tablet    clopidogrel (Plavix) 75 mg tablet     rosuvastatin (Crestor) 10 mg tablet    Paroxysmal atrial fibrillation (CMS/[facility]) I48.0    Relevant Medications    apixaban (Eliquis) 5 mg tablet    carvedilol (Coreg) 3.125 mg tablet    clopidogrel (Plavix) 75 mg tablet    Peripheral neuropathy G62.9    Relevant Medications    gabapentin (Neurontin) 300 mg capsule     Other Visit Diagnoses         Codes    Thrombocytopenia (CMS/[facility])     D69.6    Relevant Medications    apixaban (Eliquis) 5 mg tablet    clopidogrel (Plavix) 75 mg tablet    Other Relevant Orders    CBC and Auto Differential (Completed)    Diabetes mellitus type 2 without retinopathy (CMS/[facility])     E11.9    Relevant Orders    POCT glycosylated hemoglobin (Hb A1C) manually resulted (Completed)        Medications renewed.  Diabetes under adequate control for age.  Of note previously was on insulin low-dose.  Continue to monitor diet fairly close.  Encourage influenza shingles and COVID vaccines.  Patient declines today

## 2023-12-31 ENCOUNTER — APPOINTMENT (OUTPATIENT)
Dept: RADIOLOGY | Facility: HOSPITAL | Age: 88
DRG: 194 | End: 2023-12-31
Payer: MEDICARE

## 2023-12-31 ENCOUNTER — HOSPITAL ENCOUNTER (INPATIENT)
Facility: HOSPITAL | Age: 88
LOS: 4 days | Discharge: NURSING, REHAB OR LONG-TERM CARE | DRG: 194 | End: 2024-01-04
Attending: STUDENT IN AN ORGANIZED HEALTH CARE EDUCATION/TRAINING PROGRAM | Admitting: INTERNAL MEDICINE
Payer: MEDICARE

## 2023-12-31 ENCOUNTER — APPOINTMENT (OUTPATIENT)
Dept: CARDIOLOGY | Facility: HOSPITAL | Age: 88
DRG: 194 | End: 2023-12-31
Payer: MEDICARE

## 2023-12-31 DIAGNOSIS — N18.32 CHRONIC KIDNEY DISEASE, STAGE 3B (MULTI): ICD-10-CM

## 2023-12-31 DIAGNOSIS — I45.9 STOKES-ADAMS SYNCOPE: ICD-10-CM

## 2023-12-31 DIAGNOSIS — I48.0 PAROXYSMAL ATRIAL FIBRILLATION (MULTI): ICD-10-CM

## 2023-12-31 DIAGNOSIS — R55 SYNCOPE, UNSPECIFIED SYNCOPE TYPE: ICD-10-CM

## 2023-12-31 DIAGNOSIS — J10.1 INFLUENZA A: Primary | ICD-10-CM

## 2023-12-31 DIAGNOSIS — E86.0 DEHYDRATION: ICD-10-CM

## 2023-12-31 PROBLEM — E11.9 TYPE 2 DIABETES MELLITUS, WITHOUT LONG-TERM CURRENT USE OF INSULIN (MULTI): Status: ACTIVE | Noted: 2023-12-31

## 2023-12-31 LAB
ALBUMIN SERPL BCP-MCNC: 4.1 G/DL (ref 3.4–5)
ALP SERPL-CCNC: 72 U/L (ref 33–136)
ALT SERPL W P-5'-P-CCNC: 41 U/L (ref 7–45)
ANION GAP SERPL CALC-SCNC: 17 MMOL/L (ref 10–20)
AST SERPL W P-5'-P-CCNC: 42 U/L (ref 9–39)
BASOPHILS # BLD AUTO: 0.03 X10*3/UL (ref 0–0.1)
BASOPHILS NFR BLD AUTO: 0.3 %
BILIRUB SERPL-MCNC: 0.5 MG/DL (ref 0–1.2)
BNP SERPL-MCNC: 322 PG/ML (ref 0–99)
BUN SERPL-MCNC: 23 MG/DL (ref 6–23)
CALCIUM SERPL-MCNC: 9.3 MG/DL (ref 8.6–10.3)
CARDIAC TROPONIN I PNL SERPL HS: 25 NG/L (ref 0–13)
CARDIAC TROPONIN I PNL SERPL HS: 35 NG/L (ref 0–13)
CHLORIDE SERPL-SCNC: 98 MMOL/L (ref 98–107)
CO2 SERPL-SCNC: 25 MMOL/L (ref 21–32)
CREAT SERPL-MCNC: 1.49 MG/DL (ref 0.5–1.05)
EOSINOPHIL # BLD AUTO: 0.01 X10*3/UL (ref 0–0.4)
EOSINOPHIL NFR BLD AUTO: 0.1 %
ERYTHROCYTE [DISTWIDTH] IN BLOOD BY AUTOMATED COUNT: 12.9 % (ref 11.5–14.5)
FLUAV RNA RESP QL NAA+PROBE: DETECTED
FLUBV RNA RESP QL NAA+PROBE: NOT DETECTED
GFR SERPL CREATININE-BSD FRML MDRD: 32 ML/MIN/1.73M*2
GLUCOSE BLD MANUAL STRIP-MCNC: 184 MG/DL (ref 74–99)
GLUCOSE BLD MANUAL STRIP-MCNC: 266 MG/DL (ref 74–99)
GLUCOSE SERPL-MCNC: 232 MG/DL (ref 74–99)
HCT VFR BLD AUTO: 41.6 % (ref 36–46)
HGB BLD-MCNC: 13.7 G/DL (ref 12–16)
IMM GRANULOCYTES # BLD AUTO: 0.05 X10*3/UL (ref 0–0.5)
IMM GRANULOCYTES NFR BLD AUTO: 0.6 % (ref 0–0.9)
INR PPP: 1.5 (ref 0.9–1.1)
LYMPHOCYTES # BLD AUTO: 1.43 X10*3/UL (ref 0.8–3)
LYMPHOCYTES NFR BLD AUTO: 15.9 %
MAGNESIUM SERPL-MCNC: 1.7 MG/DL (ref 1.6–2.4)
MCH RBC QN AUTO: 33.5 PG (ref 26–34)
MCHC RBC AUTO-ENTMCNC: 32.9 G/DL (ref 32–36)
MCV RBC AUTO: 102 FL (ref 80–100)
MONOCYTES # BLD AUTO: 1.46 X10*3/UL (ref 0.05–0.8)
MONOCYTES NFR BLD AUTO: 16.2 %
NEUTROPHILS # BLD AUTO: 6.01 X10*3/UL (ref 1.6–5.5)
NEUTROPHILS NFR BLD AUTO: 66.9 %
NRBC BLD-RTO: 0 /100 WBCS (ref 0–0)
PLATELET # BLD AUTO: 142 X10*3/UL (ref 150–450)
POTASSIUM SERPL-SCNC: 4.4 MMOL/L (ref 3.5–5.3)
PROT SERPL-MCNC: 7.8 G/DL (ref 6.4–8.2)
PROTHROMBIN TIME: 17.5 SECONDS (ref 9.8–12.8)
RBC # BLD AUTO: 4.09 X10*6/UL (ref 4–5.2)
SARS-COV-2 RNA RESP QL NAA+PROBE: NOT DETECTED
SODIUM SERPL-SCNC: 136 MMOL/L (ref 136–145)
WBC # BLD AUTO: 9 X10*3/UL (ref 4.4–11.3)

## 2023-12-31 PROCEDURE — 80053 COMPREHEN METABOLIC PANEL: CPT | Performed by: PHYSICIAN ASSISTANT

## 2023-12-31 PROCEDURE — 2500000004 HC RX 250 GENERAL PHARMACY W/ HCPCS (ALT 636 FOR OP/ED): Performed by: INTERNAL MEDICINE

## 2023-12-31 PROCEDURE — 85025 COMPLETE CBC W/AUTO DIFF WBC: CPT | Performed by: PHYSICIAN ASSISTANT

## 2023-12-31 PROCEDURE — 72125 CT NECK SPINE W/O DYE: CPT | Performed by: RADIOLOGY

## 2023-12-31 PROCEDURE — 36415 COLL VENOUS BLD VENIPUNCTURE: CPT | Performed by: INTERNAL MEDICINE

## 2023-12-31 PROCEDURE — 84484 ASSAY OF TROPONIN QUANT: CPT | Performed by: INTERNAL MEDICINE

## 2023-12-31 PROCEDURE — 71045 X-RAY EXAM CHEST 1 VIEW: CPT | Mod: FR

## 2023-12-31 PROCEDURE — 1200000002 HC GENERAL ROOM WITH TELEMETRY DAILY

## 2023-12-31 PROCEDURE — 71045 X-RAY EXAM CHEST 1 VIEW: CPT | Mod: FOREIGN READ | Performed by: RADIOLOGY

## 2023-12-31 PROCEDURE — 36415 COLL VENOUS BLD VENIPUNCTURE: CPT | Performed by: PHYSICIAN ASSISTANT

## 2023-12-31 PROCEDURE — 70450 CT HEAD/BRAIN W/O DYE: CPT

## 2023-12-31 PROCEDURE — 83880 ASSAY OF NATRIURETIC PEPTIDE: CPT | Performed by: PHYSICIAN ASSISTANT

## 2023-12-31 PROCEDURE — 2500000001 HC RX 250 WO HCPCS SELF ADMINISTERED DRUGS (ALT 637 FOR MEDICARE OP): Performed by: INTERNAL MEDICINE

## 2023-12-31 PROCEDURE — 96360 HYDRATION IV INFUSION INIT: CPT

## 2023-12-31 PROCEDURE — 2500000004 HC RX 250 GENERAL PHARMACY W/ HCPCS (ALT 636 FOR OP/ED): Performed by: PHYSICIAN ASSISTANT

## 2023-12-31 PROCEDURE — 87636 SARSCOV2 & INF A&B AMP PRB: CPT | Performed by: PHYSICIAN ASSISTANT

## 2023-12-31 PROCEDURE — 70450 CT HEAD/BRAIN W/O DYE: CPT | Performed by: RADIOLOGY

## 2023-12-31 PROCEDURE — 99285 EMERGENCY DEPT VISIT HI MDM: CPT | Performed by: STUDENT IN AN ORGANIZED HEALTH CARE EDUCATION/TRAINING PROGRAM

## 2023-12-31 PROCEDURE — 83735 ASSAY OF MAGNESIUM: CPT | Performed by: PHYSICIAN ASSISTANT

## 2023-12-31 PROCEDURE — 72125 CT NECK SPINE W/O DYE: CPT

## 2023-12-31 PROCEDURE — 85610 PROTHROMBIN TIME: CPT | Performed by: PHYSICIAN ASSISTANT

## 2023-12-31 PROCEDURE — 84484 ASSAY OF TROPONIN QUANT: CPT | Performed by: PHYSICIAN ASSISTANT

## 2023-12-31 PROCEDURE — 99223 1ST HOSP IP/OBS HIGH 75: CPT | Performed by: INTERNAL MEDICINE

## 2023-12-31 PROCEDURE — 93005 ELECTROCARDIOGRAM TRACING: CPT

## 2023-12-31 PROCEDURE — 82947 ASSAY GLUCOSE BLOOD QUANT: CPT

## 2023-12-31 RX ORDER — CARVEDILOL 3.12 MG/1
3.12 TABLET ORAL
Status: DISCONTINUED | OUTPATIENT
Start: 2023-12-31 | End: 2024-01-04 | Stop reason: HOSPADM

## 2023-12-31 RX ORDER — OSELTAMIVIR PHOSPHATE 30 MG/1
30 CAPSULE ORAL DAILY
Status: COMPLETED | OUTPATIENT
Start: 2023-12-31 | End: 2024-01-04

## 2023-12-31 RX ORDER — GABAPENTIN 300 MG/1
300 CAPSULE ORAL 2 TIMES DAILY
Status: DISCONTINUED | OUTPATIENT
Start: 2023-12-31 | End: 2024-01-04 | Stop reason: HOSPADM

## 2023-12-31 RX ORDER — DEXTROSE 50 % IN WATER (D50W) INTRAVENOUS SYRINGE
25
Status: DISCONTINUED | OUTPATIENT
Start: 2023-12-31 | End: 2024-01-04 | Stop reason: HOSPADM

## 2023-12-31 RX ORDER — ACETAMINOPHEN 160 MG/5ML
650 SOLUTION ORAL EVERY 4 HOURS PRN
Status: DISCONTINUED | OUTPATIENT
Start: 2023-12-31 | End: 2024-01-04 | Stop reason: HOSPADM

## 2023-12-31 RX ORDER — SODIUM CHLORIDE 9 MG/ML
70 INJECTION, SOLUTION INTRAVENOUS CONTINUOUS
Status: ACTIVE | OUTPATIENT
Start: 2023-12-31 | End: 2024-01-01

## 2023-12-31 RX ORDER — GUAIFENESIN/DEXTROMETHORPHAN 100-10MG/5
5 SYRUP ORAL EVERY 4 HOURS PRN
Status: DISCONTINUED | OUTPATIENT
Start: 2023-12-31 | End: 2024-01-04 | Stop reason: HOSPADM

## 2023-12-31 RX ORDER — ROSUVASTATIN CALCIUM 10 MG/1
10 TABLET, COATED ORAL NIGHTLY
Status: DISCONTINUED | OUTPATIENT
Start: 2023-12-31 | End: 2024-01-04 | Stop reason: HOSPADM

## 2023-12-31 RX ORDER — CLOPIDOGREL BISULFATE 75 MG/1
75 TABLET ORAL DAILY
Status: DISCONTINUED | OUTPATIENT
Start: 2024-01-01 | End: 2024-01-04 | Stop reason: HOSPADM

## 2023-12-31 RX ORDER — ACETAMINOPHEN 325 MG/1
650 TABLET ORAL EVERY 4 HOURS PRN
Status: DISCONTINUED | OUTPATIENT
Start: 2023-12-31 | End: 2024-01-04 | Stop reason: HOSPADM

## 2023-12-31 RX ORDER — CHOLECALCIFEROL (VITAMIN D3) 25 MCG
2000 TABLET ORAL DAILY
Status: DISCONTINUED | OUTPATIENT
Start: 2024-01-01 | End: 2024-01-04 | Stop reason: HOSPADM

## 2023-12-31 RX ORDER — ACETAMINOPHEN 325 MG/1
1000 TABLET ORAL 2 TIMES DAILY
Status: DISCONTINUED | OUTPATIENT
Start: 2023-12-31 | End: 2024-01-04 | Stop reason: HOSPADM

## 2023-12-31 RX ORDER — POLYETHYLENE GLYCOL 3350 17 G/17G
17 POWDER, FOR SOLUTION ORAL DAILY PRN
Status: DISCONTINUED | OUTPATIENT
Start: 2023-12-31 | End: 2024-01-02

## 2023-12-31 RX ORDER — DEXTROSE MONOHYDRATE 100 MG/ML
0.3 INJECTION, SOLUTION INTRAVENOUS ONCE AS NEEDED
Status: DISCONTINUED | OUTPATIENT
Start: 2023-12-31 | End: 2024-01-04 | Stop reason: HOSPADM

## 2023-12-31 RX ORDER — INSULIN LISPRO 100 [IU]/ML
0-5 INJECTION, SOLUTION INTRAVENOUS; SUBCUTANEOUS
Status: DISCONTINUED | OUTPATIENT
Start: 2023-12-31 | End: 2024-01-04 | Stop reason: HOSPADM

## 2023-12-31 RX ORDER — ACETAMINOPHEN 650 MG/1
650 SUPPOSITORY RECTAL EVERY 4 HOURS PRN
Status: DISCONTINUED | OUTPATIENT
Start: 2023-12-31 | End: 2024-01-04 | Stop reason: HOSPADM

## 2023-12-31 RX ADMIN — GABAPENTIN 300 MG: 300 CAPSULE ORAL at 21:07

## 2023-12-31 RX ADMIN — CARVEDILOL 3.12 MG: 3.12 TABLET, FILM COATED ORAL at 16:14

## 2023-12-31 RX ADMIN — ACETAMINOPHEN 975 MG: 325 TABLET ORAL at 16:14

## 2023-12-31 RX ADMIN — SODIUM CHLORIDE 70 ML/HR: 9 INJECTION, SOLUTION INTRAVENOUS at 16:13

## 2023-12-31 RX ADMIN — SODIUM CHLORIDE 500 ML: 9 INJECTION, SOLUTION INTRAVENOUS at 21:19

## 2023-12-31 RX ADMIN — SODIUM CHLORIDE 1000 ML: 9 INJECTION, SOLUTION INTRAVENOUS at 09:50

## 2023-12-31 RX ADMIN — APIXABAN 2.5 MG: 2.5 TABLET, FILM COATED ORAL at 21:07

## 2023-12-31 RX ADMIN — ROSUVASTATIN CALCIUM 10 MG: 10 TABLET, FILM COATED ORAL at 21:07

## 2023-12-31 RX ADMIN — GUAIFENESIN AND DEXTROMETHORPHAN 5 ML: 100; 10 SYRUP ORAL at 16:14

## 2023-12-31 RX ADMIN — OSELTAMIVIR PHOSPHATE 30 MG: 30 CAPSULE ORAL at 16:14

## 2023-12-31 SDOH — SOCIAL STABILITY: SOCIAL INSECURITY: HAVE YOU HAD THOUGHTS OF HARMING ANYONE ELSE?: NO

## 2023-12-31 SDOH — SOCIAL STABILITY: SOCIAL INSECURITY: ABUSE: ADULT

## 2023-12-31 SDOH — SOCIAL STABILITY: SOCIAL INSECURITY: DO YOU FEEL ANYONE HAS EXPLOITED OR TAKEN ADVANTAGE OF YOU FINANCIALLY OR OF YOUR PERSONAL PROPERTY?: NO

## 2023-12-31 SDOH — SOCIAL STABILITY: SOCIAL INSECURITY: HAS ANYONE EVER THREATENED TO HURT YOUR FAMILY OR YOUR PETS?: NO

## 2023-12-31 SDOH — SOCIAL STABILITY: SOCIAL INSECURITY: ARE THERE ANY APPARENT SIGNS OF INJURIES/BEHAVIORS THAT COULD BE RELATED TO ABUSE/NEGLECT?: NO

## 2023-12-31 SDOH — SOCIAL STABILITY: SOCIAL INSECURITY: WERE YOU ABLE TO COMPLETE ALL THE BEHAVIORAL HEALTH SCREENINGS?: YES

## 2023-12-31 SDOH — SOCIAL STABILITY: SOCIAL INSECURITY: DOES ANYONE TRY TO KEEP YOU FROM HAVING/CONTACTING OTHER FRIENDS OR DOING THINGS OUTSIDE YOUR HOME?: NO

## 2023-12-31 SDOH — SOCIAL STABILITY: SOCIAL INSECURITY: ARE YOU OR HAVE YOU BEEN THREATENED OR ABUSED PHYSICALLY, EMOTIONALLY, OR SEXUALLY BY ANYONE?: NO

## 2023-12-31 SDOH — SOCIAL STABILITY: SOCIAL INSECURITY: DO YOU FEEL UNSAFE GOING BACK TO THE PLACE WHERE YOU ARE LIVING?: NO

## 2023-12-31 ASSESSMENT — ENCOUNTER SYMPTOMS
COUGH: 1
PALPITATIONS: 0
SORE THROAT: 0
RHINORRHEA: 1
EYE PAIN: 0
DIZZINESS: 0
NAUSEA: 0
VOMITING: 0
SHORTNESS OF BREATH: 0
DIARRHEA: 0
WOUND: 0
DYSPHORIC MOOD: 0
FATIGUE: 1
ARTHRALGIAS: 0
ABDOMINAL PAIN: 0
NERVOUS/ANXIOUS: 0
CHILLS: 0
HEMATURIA: 0
FEVER: 0
BACK PAIN: 0
HEADACHES: 0
DYSURIA: 0

## 2023-12-31 ASSESSMENT — COGNITIVE AND FUNCTIONAL STATUS - GENERAL
PATIENT BASELINE BEDBOUND: NO
MOBILITY SCORE: 24
DAILY ACTIVITIY SCORE: 23
HELP NEEDED FOR BATHING: A LITTLE

## 2023-12-31 ASSESSMENT — PAIN SCALES - GENERAL
PAINLEVEL_OUTOF10: 0 - NO PAIN
PAINLEVEL_OUTOF10: 0 - NO PAIN

## 2023-12-31 ASSESSMENT — LIFESTYLE VARIABLES
REASON UNABLE TO ASSESS: NO
AUDIT-C TOTAL SCORE: 0
HAVE PEOPLE ANNOYED YOU BY CRITICIZING YOUR DRINKING: NO
EVER FELT BAD OR GUILTY ABOUT YOUR DRINKING: NO
HAVE YOU EVER FELT YOU SHOULD CUT DOWN ON YOUR DRINKING: NO
EVER HAD A DRINK FIRST THING IN THE MORNING TO STEADY YOUR NERVES TO GET RID OF A HANGOVER: NO
HOW OFTEN DO YOU HAVE 6 OR MORE DRINKS ON ONE OCCASION: NEVER
AUDIT-C TOTAL SCORE: 0
HOW MANY STANDARD DRINKS CONTAINING ALCOHOL DO YOU HAVE ON A TYPICAL DAY: PATIENT DOES NOT DRINK
SKIP TO QUESTIONS 9-10: 1
HOW OFTEN DO YOU HAVE A DRINK CONTAINING ALCOHOL: NEVER

## 2023-12-31 ASSESSMENT — ACTIVITIES OF DAILY LIVING (ADL)
TOILETING: INDEPENDENT
LACK_OF_TRANSPORTATION: NO
ADEQUATE_TO_COMPLETE_ADL: YES
ASSISTIVE_DEVICE: WALKER
PATIENT'S MEMORY ADEQUATE TO SAFELY COMPLETE DAILY ACTIVITIES?: YES
JUDGMENT_ADEQUATE_SAFELY_COMPLETE_DAILY_ACTIVITIES: YES
GROOMING: INDEPENDENT
DRESSING YOURSELF: INDEPENDENT
HEARING - RIGHT EAR: FUNCTIONAL
HEARING - LEFT EAR: FUNCTIONAL
FEEDING YOURSELF: INDEPENDENT
BATHING: NEEDS ASSISTANCE
WALKS IN HOME: INDEPENDENT

## 2023-12-31 ASSESSMENT — COLUMBIA-SUICIDE SEVERITY RATING SCALE - C-SSRS
6. HAVE YOU EVER DONE ANYTHING, STARTED TO DO ANYTHING, OR PREPARED TO DO ANYTHING TO END YOUR LIFE?: NO
1. IN THE PAST MONTH, HAVE YOU WISHED YOU WERE DEAD OR WISHED YOU COULD GO TO SLEEP AND NOT WAKE UP?: NO
2. HAVE YOU ACTUALLY HAD ANY THOUGHTS OF KILLING YOURSELF?: NO

## 2023-12-31 ASSESSMENT — PAIN - FUNCTIONAL ASSESSMENT
PAIN_FUNCTIONAL_ASSESSMENT: 0-10
PAIN_FUNCTIONAL_ASSESSMENT: 0-10

## 2023-12-31 ASSESSMENT — PATIENT HEALTH QUESTIONNAIRE - PHQ9
SUM OF ALL RESPONSES TO PHQ9 QUESTIONS 1 & 2: 0
1. LITTLE INTEREST OR PLEASURE IN DOING THINGS: NOT AT ALL
2. FEELING DOWN, DEPRESSED OR HOPELESS: NOT AT ALL

## 2023-12-31 NOTE — ED PROVIDER NOTES
HPI   Chief Complaint   Patient presents with    Syncope     Pt from home. Has a syncopal episode on the way to the bathroom this AM. EMS found py sitting on toilet in and out of consciousness. Per EMS pt's daughter reports pt has not been eating and drinking and and has not been acting right since last night. Denies hitting her head, takes Eliquis.        This is a 89-year-old female with PMH A-fib, pacemaker, on Eliquis, DM presenting for evaluation of syncope.  Was being assisted to the bathroom by her daughter when patient states she passed out.  Does not remember much about it.  Does not think she was lightheaded beforehand.  Denies head injury.  Denies neck pain, back pain, numbness, tingling loss of sensation, focal weakness, chest pain, palpitations, shortness of breath.  Reports cold symptoms for the last 3 to 4 days.      History provided by:  Patient and EMS personnel   used: No                        No data recorded                Patient History   Past Medical History:   Diagnosis Date    Acute cystitis without hematuria 05/10/2017    Acute cystitis without hematuria    Allergic rhinitis due to pollen 08/18/2017    Acute seasonal allergic rhinitis due to pollen    Body mass index (BMI) 28.0-28.9, adult 10/16/2018    BMI 28.0-28.9,adult    Body mass index (BMI) 29.0-29.9, adult 05/14/2020    BMI 29.0-29.9,adult    Cervicalgia 09/13/2013    Cervicalgia    Chronic ischemic heart disease, unspecified 08/14/2013    Chronic myocardial ischemia    Chronic kidney disease, stage 4 (severe) (CMS/HCC) 11/18/2020    CKD (chronic kidney disease), stage IV    Flushing 08/28/2014    Flushing    Hereditary and idiopathic neuropathy, unspecified 08/14/2013    Hereditary and idiopathic neuropathy    Other acute sinusitis 12/15/2020    Other acute sinusitis, recurrence not specified    Other conditions influencing health status 03/26/2021    History of burning on urination    Other pulmonary embolism  without acute cor pulmonale (CMS/HCC) 05/25/2022    Multiple pulmonary emboli    Other specified disorders of eustachian tube, right ear 10/04/2019    Dysfunction of right eustachian tube    Personal history of other diseases of the digestive system 12/02/2014    History of constipation    Personal history of other diseases of the musculoskeletal system and connective tissue 05/25/2022    History of polymyalgia rheumatica    Personal history of other diseases of urinary system 08/14/2013    History of pyelonephritis    Personal history of other specified conditions 06/12/2017    History of bruising easily    Personal history of other specified conditions 06/28/2013    History of syncope    Personal history of other specified conditions 01/04/2021    History of confusion    Personal history of other specified conditions 08/28/2014    History of excessive sweating    Personal history of other venous thrombosis and embolism 05/25/2022    History of deep venous thrombosis    Presence of other cardiac implants and grafts 09/12/2017    Status post placement of implantable loop recorder    Pyuria 01/04/2021    Pyuria    Shingles 05/08/2023    Suspected COVID-19 virus infection 05/08/2023    Syncope and collapse 11/24/2016    Near syncope    Urinary tract infection 05/08/2023     Past Surgical History:   Procedure Laterality Date    APPENDECTOMY  08/14/2013    Appendectomy    CARPAL TUNNEL RELEASE  05/22/2013    Neuroplasty Decompression Median Nerve At Carpal Tunnel    CATARACT EXTRACTION  08/14/2013    Cataract Surgery    CHOLECYSTECTOMY  08/14/2013    Cholecystectomy    COLONOSCOPY  08/14/2013    Complete Colonoscopy    CYSTOSCOPY  08/14/2013    Diagnostic Cystoscopy    DILATION AND CURETTAGE OF UTERUS  08/14/2013    Dilation And Curettage    MR HEAD ANGIO WO IV CONTRAST  11/14/2016    MR HEAD ANGIO WO IV CONTRAST 11/14/2016 [facility]    MR NECK ANGIO WO IV CONTRAST  11/14/2016    MR NECK ANGIO WO IV CONTRAST  11/14/2016 [facility]    OTHER SURGICAL HISTORY  05/22/2013    Neuroplasty With Transposition Of Ulnar Nerve    OTHER SURGICAL HISTORY  05/22/2013    Excision Of Lesion Fingers    OTHER SURGICAL HISTORY  05/22/2013    Osteotomy For Phalangeal Deformity Of Finger    OTHER SURGICAL HISTORY  09/07/2013    Cath Placement Of Stent 3    OTHER SURGICAL HISTORY  09/18/2013    Cardiac Cath Procedure Outcome: Successful    OTHER SURGICAL HISTORY  08/14/2013    Cath Stent 1 Stenosis    OTHER SURGICAL HISTORY  08/14/2013    Cath Stent 2 Initial    TONSILLECTOMY  08/14/2013    Tonsillectomy    TOTAL HIP ARTHROPLASTY  08/14/2013    Hip Replacement    TUBAL LIGATION  08/14/2013    Tubal Ligation    VARICOSE VEIN SURGERY  08/14/2013    Varicose Vein Ligation     No family history on file.  Social History     Tobacco Use    Smoking status: Never    Smokeless tobacco: Never   Substance Use Topics    Alcohol use: Never    Drug use: Never       Physical Exam   ED Triage Vitals [12/31/23 0934]   Temp Heart Rate Resp BP   36.6 °C (97.9 °F) 82 18 97/77      SpO2 Temp src Heart Rate Source Patient Position   92 % -- -- --      BP Location FiO2 (%)     -- --       Physical Exam    General: Vitals noted, no distress. Afebrile.  High BMI.  EENT: Posterior oropharynx patent and unremarkable. Mucous membranes dry  Neck: Supple. No meningismus  Cardiac: Regular rate and rhythm.  Grade 2/6 systolic murmur heard best at the LSB.  Pulmonary: Lungs clear bilaterally with good aeration. No adventitious breath sounds  Abdomen: Soft. Nontender  Extremities: No peripheral edema   Skin: No rash  Neuro: No focal neurologic deficits  Psych: Appropriate mood and affect    ED Course & MDM   Diagnoses as of 12/31/23 1142   Influenza A   Syncope, unspecified syncope type   Dehydration       Medical Decision Making  DDx: Orthostasis, vasovagal, dysrhythmia, ACS, flu, covid    Patient hypotensive, stable heart rate, 90 to 96% on room air.  Does not  wear home oxygen.  Afebrile.  Mentating appropriately.  Visibly nontoxic-appearing no apparent distress.  Lungs and chest clear to auscultation.  Soft nontender abdomen.  Has symmetric peripheral pulses.  Dry mucous membranes noted.  Was given IV normal saline and blood pressure improved.  Positive for influenza A.  Chest x-ray showed no evidence of superimposed pneumonia as read by the radiologist.  Given the syncopal event in the setting of anticoagulation CT head and C-spine were obtained showing no acute fracture or subluxation or acute intracranial process or hemorrhage as read by the radiologist.  Remainder of laboratory evaluation is reassuring.  Pacemaker was interrogated here in the ED by nursing and demonstrated no capture of arrhythmia or events.  BNP was noted to be elevated at 322 but I have low suspicion for acute heart failure exacerbation at this time.  Given the patient's age, comorbidities, syncopal event, dehydration and fluid status patient benefit from hospitalization for further care.  Discussed with the hospitalist team who accepted the patient.  This visit was staffed with the attending physician Dr. Yoo.      Disclaimer: This note was dictated using speech recognition software. An attempt at proofreading was made to minimize errors. Minor errors in transcription may be present. Please call if questions.    Amount and/or Complexity of Data Reviewed  Labs: ordered.  Radiology: ordered.  ECG/medicine tests: ordered and independent interpretation performed.     Details: EKG interpreted by me: Sinus rhythm.  First-degree AV block.  Rate 79.  IL interval 262 ms.  QTc 454 ms.  Left axis.  LVH.  No STEMI.        Procedure  Procedures     Jerry R WireYANIRA  12/31/23 1141

## 2023-12-31 NOTE — CARE PLAN
The patient's goals for the shift include      The clinical goals for the shift include keep safe

## 2023-12-31 NOTE — H&P
History Of Present Illness  Andreia Camejo is a 89 y.o. female with a history of multiple medical problems including chronic kidney disease stage IV, chronic ischemic heart disease, pulmonary embolism, atrial fibrillation, and diabetes mellitus who presented to the emergency department on December 31 after passing out at home. She was being assisted to the bathroom at the time by her daughter.  Patient denied feeling lightheaded prior to the episode but also noted not remembering much about it in general.  She denied any chest pain, shortness of breath, or palpitations.  She did note cold symptoms for 3 to 4 days prior to presentation.  She denied any focal weakness in her extremities or difficulty with thinking.    Laboratory evaluation in the emergency department was notable for glucose 232, creatinine 1.49, AST 42, , troponin 35, INR 1.5, and platelet count 142.  Patient tested positive for influenza A.  Most recent hemoglobin A1c was on November 15 and was 8.3%.  Electrolytes, bilirubin, white blood cell count, and hemoglobin were unremarkable.  Chest x-ray showed no acute process.  CT of the brain and cervical spine showed no acute fracture or intracranial bleed.  Patient was hypotensive on arrival to the ED with systolic reading reported as low as 88.  However this did improve with a 1 L fluid bolus.  Pacemaker check was reportedly benign.  She was admitted for further evaluation and treatment.     Past Medical History  She has a past medical history of Acute cystitis without hematuria (05/10/2017), Allergic rhinitis due to pollen (08/18/2017), Body mass index (BMI) 28.0-28.9, adult (10/16/2018), Body mass index (BMI) 29.0-29.9, adult (05/14/2020), Cervicalgia (09/13/2013), Chronic ischemic heart disease, unspecified (08/14/2013), Chronic kidney disease, stage 4 (severe) (CMS/[facility]) (11/18/2020), [place] (08/28/2014), Hereditary and idiopathic neuropathy, unspecified (08/14/2013), Other acute sinusitis  (12/15/2020), Other conditions influencing health status (03/26/2021), Other pulmonary embolism without acute cor pulmonale (CMS/HCC) (05/25/2022), Other specified disorders of eustachian tube, right ear (10/04/2019), Personal history of other diseases of the digestive system (12/02/2014), Personal history of other diseases of the musculoskeletal system and connective tissue (05/25/2022), Personal history of other diseases of urinary system (08/14/2013), Personal history of other specified conditions (06/12/2017), Personal history of other specified conditions (06/28/2013), Personal history of other specified conditions (01/04/2021), Personal history of other specified conditions (08/28/2014), Personal history of other venous thrombosis and embolism (05/25/2022), Presence of other cardiac implants and grafts (09/12/2017), Pyuria (01/04/2021), Shingles (05/08/2023), Suspected COVID-19 virus infection (05/08/2023), Syncope and collapse (11/24/2016), and Urinary tract infection (05/08/2023).    Surgical History  She has a past surgical history that includes Carpal tunnel release (05/22/2013); Other surgical history (05/22/2013); Other surgical history (05/22/2013); Other surgical history (05/22/2013); Other surgical history (09/07/2013); Other surgical history (09/18/2013); Cataract extraction (08/14/2013); Total hip arthroplasty (08/14/2013); Dilation and curettage of uterus (08/14/2013); Appendectomy (08/14/2013); Tubal ligation (08/14/2013); Other surgical history (08/14/2013); Other surgical history (08/14/2013); Cholecystectomy (08/14/2013); Colonoscopy (08/14/2013); Cystoscopy (08/14/2013); Tonsillectomy (08/14/2013); Varicose vein surgery (08/14/2013); MR angio head wo IV contrast (11/14/2016); and MR angio neck wo IV contrast (11/14/2016).     Social History  She reports that she has never smoked. She has never used smokeless tobacco. She reports that she does not drink alcohol and does not use drugs.    Family  History  No family history on file.     Allergies  Influenza virus vaccine whole, Iodinated contrast media, Lisinopril, Moxifloxacin, Penicillin, Penicillins, Red dye, and Adhesive tape-silicones    Review of Systems   Constitutional:  Positive for fatigue. Negative for chills and fever.   HENT:  Positive for rhinorrhea. Negative for postnasal drip and sore throat.    Eyes:  Negative for pain and visual disturbance.   Respiratory:  Positive for cough. Negative for shortness of breath.    Cardiovascular:  Negative for chest pain, palpitations and leg swelling.   Gastrointestinal:  Negative for abdominal pain, diarrhea, nausea and vomiting.   Genitourinary:  Negative for dysuria and hematuria.   Musculoskeletal:  Negative for arthralgias and back pain.   Skin:  Negative for rash and wound.   Neurological:  Positive for syncope. Negative for dizziness and headaches.   Psychiatric/Behavioral:  Negative for dysphoric mood. The patient is not nervous/anxious.         Physical Exam  Vitals and nursing note reviewed.   Constitutional:       General: She is not in acute distress.     Appearance: She is ill-appearing.   HENT:      Head: Normocephalic and atraumatic.      Right Ear: External ear normal.      Left Ear: External ear normal.      Nose: Nose normal. No rhinorrhea.      Mouth/Throat:      Mouth: Mucous membranes are moist.      Pharynx: Oropharynx is clear. No oropharyngeal exudate.   Eyes:      General: No scleral icterus.        Right eye: No discharge.         Left eye: No discharge.      Conjunctiva/sclera: Conjunctivae normal.   Cardiovascular:      Rate and Rhythm: Normal rate and regular rhythm.      Pulses: Normal pulses.      Heart sounds: Normal heart sounds. No murmur heard.  Pulmonary:      Effort: Pulmonary effort is normal. No respiratory distress.      Breath sounds: Normal breath sounds. No wheezing or rales.   Abdominal:      General: Abdomen is flat. There is no distension.      Palpations:  Abdomen is soft.      Tenderness: There is no abdominal tenderness.   Musculoskeletal:         General: No tenderness.      Right lower leg: No edema.      Left lower leg: No edema.   Skin:     General: Skin is warm and dry.      Capillary Refill: Capillary refill takes less than 2 seconds.      Findings: No rash.   Neurological:      General: No focal deficit present.      Mental Status: She is alert and oriented to person, place, and time. Mental status is at baseline.   Psychiatric:         Mood and Affect: Mood normal.         Behavior: Behavior normal.         Thought Content: Thought content normal.         Judgment: Judgment normal.          Last Recorded Vitals  /88   Pulse (!) 116   Temp 36.5 °C (97.7 °F)   Resp 16   Wt 86.2 kg (190 lb)   SpO2 91%     Relevant Results      Results for orders placed or performed during the hospital encounter of 12/31/23 (from the past 24 hour(s))   CBC and Auto Differential   Result Value Ref Range    WBC 9.0 4.4 - 11.3 x10*3/uL    nRBC 0.0 0.0 - 0.0 /100 WBCs    RBC 4.09 4.00 - 5.20 x10*6/uL    Hemoglobin 13.7 12.0 - 16.0 g/dL    Hematocrit 41.6 36.0 - 46.0 %     (H) 80 - 100 fL    MCH 33.5 26.0 - 34.0 pg    MCHC 32.9 32.0 - 36.0 g/dL    RDW 12.9 11.5 - 14.5 %    Platelets 142 (L) 150 - 450 x10*3/uL    Neutrophils % 66.9 40.0 - 80.0 %    Immature Granulocytes %, Automated 0.6 0.0 - 0.9 %    Lymphocytes % 15.9 13.0 - 44.0 %    Monocytes % 16.2 2.0 - 10.0 %    Eosinophils % 0.1 0.0 - 6.0 %    Basophils % 0.3 0.0 - 2.0 %    Neutrophils Absolute 6.01 (H) 1.60 - 5.50 x10*3/uL    Immature Granulocytes Absolute, Automated 0.05 0.00 - 0.50 x10*3/uL    Lymphocytes Absolute 1.43 0.80 - 3.00 x10*3/uL    Monocytes Absolute 1.46 (H) 0.05 - 0.80 x10*3/uL    Eosinophils Absolute 0.01 0.00 - 0.40 x10*3/uL    Basophils Absolute 0.03 0.00 - 0.10 x10*3/uL   Magnesium   Result Value Ref Range    Magnesium 1.70 1.60 - 2.40 mg/dL   Comprehensive metabolic panel   Result Value  Ref Range    Glucose 232 (H) 74 - 99 mg/dL    Sodium 136 136 - 145 mmol/L    Potassium 4.4 3.5 - 5.3 mmol/L    Chloride 98 98 - 107 mmol/L    Bicarbonate 25 21 - 32 mmol/L    Anion Gap 17 10 - 20 mmol/L    Urea Nitrogen 23 6 - 23 mg/dL    Creatinine 1.49 (H) 0.50 - 1.05 mg/dL    eGFR 32 (L) >60 mL/min/1.73m*2    Calcium 9.3 8.6 - 10.3 mg/dL    Albumin 4.1 3.4 - 5.0 g/dL    Alkaline Phosphatase 72 33 - 136 U/L    Total Protein 7.8 6.4 - 8.2 g/dL    AST 42 (H) 9 - 39 U/L    Bilirubin, Total 0.5 0.0 - 1.2 mg/dL    ALT 41 7 - 45 U/L   Protime-INR   Result Value Ref Range    Protime 17.5 (H) 9.8 - 12.8 seconds    INR 1.5 (H) 0.9 - 1.1   Troponin I, High Sensitivity   Result Value Ref Range    Troponin I, High Sensitivity 35 (H) 0 - 13 ng/L   B-Type Natriuretic Peptide   Result Value Ref Range     (H) 0 - 99 pg/mL   Sars-CoV-2 and Influenza A/B PCR   Result Value Ref Range    Flu A Result Detected (A) Not Detected    Flu B Result Not Detected Not Detected    Coronavirus 2019, PCR Not Detected Not Detected           Assessment/Plan   Principal Problem:    Influenza A  Active Problems:    Paroxysmal atrial fibrillation (CMS/[facility])    Syncope    Chronic kidney disease, stage 3b (CMS/[facility])    Type 2 diabetes mellitus, without long-term current use of insulin (CMS/[facility])      Syncope  -Most likely on the basis of dehydration in the setting of decreased p.o. intake with acute illness  -Sudden onset without prodrome suggest need to rule out arrhythmia, monitor on telemetry with continuous pulse oximetry  -Check serial troponins to ensure no acute ischemic event  -Check echocardiogram  -Patient responded well to fluid bolus in ED and blood pressure now 140s.  Continue gentle hydration at 70 mL/h for 10 hours of 0.9% normal saline, monitoring oxygenation closely to ensure no compromise    Influenza A  -Will initiate patient on renal dosed Tamiflu based on current creatinine clearance of 27 mL/min.  Start 30 mg daily.  -There is  no evidence of complicating bacterial infection at this point.  However monitor for fevers or hemodynamic compromise.  No evidence of infiltrate on chest x-ray.    Chronic kidney disease stage IIIb  -Creatinine appears widely varied based on prior labs.  She appears within range of her baseline with GFR 32.  -Continue to monitor renal function during hospitalization and avoid nephrotoxins.    Diabetes mellitus  -Not on any home medications for this issue  -Most recent A1c shows suboptimal control at 8.3%  -Add sliding scale insulin Humalog and monitor blood sugars  -Diabetic diet with hypoglycemia protocols    Atrial fibrillation  -Continue apixaban and carvedilol with hold parameters for the latter  -Change apixaban to 2.5 mg BID based on current creatinine clearance  -Monitor for RVR on telemetry    Coronary artery disease  -Continue home carvedilol, rosuvastatin, clopidogrel  -Rule out acute ischemia as noted above  -Monitor on telemetry       Aaron Brizuela MD

## 2024-01-01 ENCOUNTER — APPOINTMENT (OUTPATIENT)
Dept: CARDIOLOGY | Facility: HOSPITAL | Age: 89
DRG: 194 | End: 2024-01-01
Payer: MEDICARE

## 2024-01-01 LAB
ALBUMIN SERPL BCP-MCNC: 3.2 G/DL (ref 3.4–5)
ALP SERPL-CCNC: 50 U/L (ref 33–136)
ALT SERPL W P-5'-P-CCNC: 25 U/L (ref 7–45)
ANION GAP SERPL CALC-SCNC: 12 MMOL/L (ref 10–20)
AST SERPL W P-5'-P-CCNC: 24 U/L (ref 9–39)
BASOPHILS # BLD AUTO: 0.03 X10*3/UL (ref 0–0.1)
BASOPHILS NFR BLD AUTO: 0.4 %
BILIRUB SERPL-MCNC: 0.5 MG/DL (ref 0–1.2)
BUN SERPL-MCNC: 25 MG/DL (ref 6–23)
CALCIUM SERPL-MCNC: 8.1 MG/DL (ref 8.6–10.3)
CARDIAC TROPONIN I PNL SERPL HS: 20 NG/L (ref 0–13)
CHLORIDE SERPL-SCNC: 103 MMOL/L (ref 98–107)
CO2 SERPL-SCNC: 27 MMOL/L (ref 21–32)
CREAT SERPL-MCNC: 1.34 MG/DL (ref 0.5–1.05)
EOSINOPHIL # BLD AUTO: 0.01 X10*3/UL (ref 0–0.4)
EOSINOPHIL NFR BLD AUTO: 0.1 %
ERYTHROCYTE [DISTWIDTH] IN BLOOD BY AUTOMATED COUNT: 12.9 % (ref 11.5–14.5)
GFR SERPL CREATININE-BSD FRML MDRD: 37 ML/MIN/1.73M*2
GLUCOSE BLD MANUAL STRIP-MCNC: 155 MG/DL (ref 74–99)
GLUCOSE BLD MANUAL STRIP-MCNC: 204 MG/DL (ref 74–99)
GLUCOSE BLD MANUAL STRIP-MCNC: 204 MG/DL (ref 74–99)
GLUCOSE BLD MANUAL STRIP-MCNC: 291 MG/DL (ref 74–99)
GLUCOSE SERPL-MCNC: 165 MG/DL (ref 74–99)
HCT VFR BLD AUTO: 38.6 % (ref 36–46)
HGB BLD-MCNC: 12.6 G/DL (ref 12–16)
IMM GRANULOCYTES # BLD AUTO: 0.02 X10*3/UL (ref 0–0.5)
IMM GRANULOCYTES NFR BLD AUTO: 0.3 % (ref 0–0.9)
LYMPHOCYTES # BLD AUTO: 1.62 X10*3/UL (ref 0.8–3)
LYMPHOCYTES NFR BLD AUTO: 21.1 %
MAGNESIUM SERPL-MCNC: 1.54 MG/DL (ref 1.6–2.4)
MCH RBC QN AUTO: 33.2 PG (ref 26–34)
MCHC RBC AUTO-ENTMCNC: 32.6 G/DL (ref 32–36)
MCV RBC AUTO: 102 FL (ref 80–100)
MONOCYTES # BLD AUTO: 1.43 X10*3/UL (ref 0.05–0.8)
MONOCYTES NFR BLD AUTO: 18.6 %
NEUTROPHILS # BLD AUTO: 4.58 X10*3/UL (ref 1.6–5.5)
NEUTROPHILS NFR BLD AUTO: 59.5 %
NRBC BLD-RTO: 0 /100 WBCS (ref 0–0)
PLATELET # BLD AUTO: 125 X10*3/UL (ref 150–450)
POTASSIUM SERPL-SCNC: 4.6 MMOL/L (ref 3.5–5.3)
PROT SERPL-MCNC: 6.1 G/DL (ref 6.4–8.2)
RBC # BLD AUTO: 3.79 X10*6/UL (ref 4–5.2)
SODIUM SERPL-SCNC: 137 MMOL/L (ref 136–145)
WBC # BLD AUTO: 7.7 X10*3/UL (ref 4.4–11.3)

## 2024-01-01 PROCEDURE — 2500000004 HC RX 250 GENERAL PHARMACY W/ HCPCS (ALT 636 FOR OP/ED): Performed by: INTERNAL MEDICINE

## 2024-01-01 PROCEDURE — 2500000005 HC RX 250 GENERAL PHARMACY W/O HCPCS: Performed by: INTERNAL MEDICINE

## 2024-01-01 PROCEDURE — 2500000002 HC RX 250 W HCPCS SELF ADMINISTERED DRUGS (ALT 637 FOR MEDICARE OP, ALT 636 FOR OP/ED): Performed by: INTERNAL MEDICINE

## 2024-01-01 PROCEDURE — 99233 SBSQ HOSP IP/OBS HIGH 50: CPT | Performed by: INTERNAL MEDICINE

## 2024-01-01 PROCEDURE — 82947 ASSAY GLUCOSE BLOOD QUANT: CPT

## 2024-01-01 PROCEDURE — 80053 COMPREHEN METABOLIC PANEL: CPT | Performed by: INTERNAL MEDICINE

## 2024-01-01 PROCEDURE — 1200000002 HC GENERAL ROOM WITH TELEMETRY DAILY

## 2024-01-01 PROCEDURE — 36415 COLL VENOUS BLD VENIPUNCTURE: CPT | Performed by: INTERNAL MEDICINE

## 2024-01-01 PROCEDURE — 83735 ASSAY OF MAGNESIUM: CPT | Performed by: INTERNAL MEDICINE

## 2024-01-01 PROCEDURE — 85025 COMPLETE CBC W/AUTO DIFF WBC: CPT | Performed by: INTERNAL MEDICINE

## 2024-01-01 PROCEDURE — 93306 TTE W/DOPPLER COMPLETE: CPT | Performed by: STUDENT IN AN ORGANIZED HEALTH CARE EDUCATION/TRAINING PROGRAM

## 2024-01-01 PROCEDURE — 2500000001 HC RX 250 WO HCPCS SELF ADMINISTERED DRUGS (ALT 637 FOR MEDICARE OP): Performed by: INTERNAL MEDICINE

## 2024-01-01 PROCEDURE — 84484 ASSAY OF TROPONIN QUANT: CPT | Performed by: INTERNAL MEDICINE

## 2024-01-01 PROCEDURE — 93306 TTE W/DOPPLER COMPLETE: CPT

## 2024-01-01 RX ORDER — NITROGLYCERIN 0.4 MG/1
0.4 TABLET SUBLINGUAL EVERY 5 MIN PRN
Status: DISCONTINUED | OUTPATIENT
Start: 2024-01-01 | End: 2024-01-04 | Stop reason: HOSPADM

## 2024-01-01 RX ADMIN — ROSUVASTATIN CALCIUM 10 MG: 10 TABLET, FILM COATED ORAL at 21:32

## 2024-01-01 RX ADMIN — Medication 2000 UNITS: at 08:31

## 2024-01-01 RX ADMIN — ACETAMINOPHEN 975 MG: 325 TABLET ORAL at 21:31

## 2024-01-01 RX ADMIN — NITROGLYCERIN 0.4 MG: 0.4 TABLET SUBLINGUAL at 05:44

## 2024-01-01 RX ADMIN — INSULIN LISPRO 1 UNITS: 100 INJECTION, SOLUTION INTRAVENOUS; SUBCUTANEOUS at 08:36

## 2024-01-01 RX ADMIN — CARVEDILOL 3.12 MG: 3.12 TABLET, FILM COATED ORAL at 08:31

## 2024-01-01 RX ADMIN — Medication 2 L/MIN: at 06:30

## 2024-01-01 RX ADMIN — INSULIN LISPRO 3 UNITS: 100 INJECTION, SOLUTION INTRAVENOUS; SUBCUTANEOUS at 11:51

## 2024-01-01 RX ADMIN — CARVEDILOL 3.12 MG: 3.12 TABLET, FILM COATED ORAL at 16:32

## 2024-01-01 RX ADMIN — PERFLUTREN 5 ML OF DILUTION: 6.52 INJECTION, SUSPENSION INTRAVENOUS at 10:32

## 2024-01-01 RX ADMIN — GABAPENTIN 300 MG: 300 CAPSULE ORAL at 21:31

## 2024-01-01 RX ADMIN — APIXABAN 2.5 MG: 2.5 TABLET, FILM COATED ORAL at 08:31

## 2024-01-01 RX ADMIN — GABAPENTIN 300 MG: 300 CAPSULE ORAL at 08:31

## 2024-01-01 RX ADMIN — ACETAMINOPHEN 650 MG: 325 TABLET ORAL at 05:12

## 2024-01-01 RX ADMIN — OSELTAMIVIR PHOSPHATE 30 MG: 30 CAPSULE ORAL at 08:31

## 2024-01-01 RX ADMIN — APIXABAN 2.5 MG: 2.5 TABLET, FILM COATED ORAL at 21:32

## 2024-01-01 RX ADMIN — CLOPIDOGREL 75 MG: 75 TABLET ORAL at 08:31

## 2024-01-01 RX ADMIN — INSULIN LISPRO 2 UNITS: 100 INJECTION, SOLUTION INTRAVENOUS; SUBCUTANEOUS at 16:30

## 2024-01-01 ASSESSMENT — PAIN SCALES - GENERAL
PAINLEVEL_OUTOF10: 3
PAINLEVEL_OUTOF10: 8
PAINLEVEL_OUTOF10: 0 - NO PAIN

## 2024-01-01 ASSESSMENT — COGNITIVE AND FUNCTIONAL STATUS - GENERAL
CLIMB 3 TO 5 STEPS WITH RAILING: A LITTLE
MOBILITY SCORE: 24
DAILY ACTIVITIY SCORE: 21
WALKING IN HOSPITAL ROOM: A LITTLE
DAILY ACTIVITIY SCORE: 23
MOBILITY SCORE: 22
DRESSING REGULAR LOWER BODY CLOTHING: A LITTLE
TOILETING: A LITTLE
HELP NEEDED FOR BATHING: A LITTLE
HELP NEEDED FOR BATHING: A LITTLE

## 2024-01-01 ASSESSMENT — PAIN - FUNCTIONAL ASSESSMENT
PAIN_FUNCTIONAL_ASSESSMENT: 0-10
PAIN_FUNCTIONAL_ASSESSMENT: 0-10

## 2024-01-01 ASSESSMENT — PAIN DESCRIPTION - LOCATION: LOCATION: BACK

## 2024-01-01 ASSESSMENT — ACTIVITIES OF DAILY LIVING (ADL): LACK_OF_TRANSPORTATION: NO

## 2024-01-01 NOTE — PROGRESS NOTES
Andreia Camejo is a 89 y.o. female on day 1 of admission presenting with Influenza A.      Subjective     Seen and examined, dizziness improving, no new complaints.  No overnight events.  Patient requested to be seen by cardiology.  The plan discussed with the patient and RN.       Objective     Last Recorded Vitals  /79   Pulse 101   Temp 36.9 °C (98.4 °F) (Temporal)   Resp 18   Wt 86.2 kg (190 lb)   SpO2 91%   Intake/Output last 3 Shifts:    Intake/Output Summary (Last 24 hours) at 1/1/2024 1230  Last data filed at 12/31/2023 2219  Gross per 24 hour   Intake 500 ml   Output --   Net 500 ml       Admission Weight  Weight: 86.2 kg (190 lb) (12/31/23 0934)    Daily Weight  12/31/23 : 86.2 kg (190 lb)    Image Results  CT cervical spine wo IV contrast  Narrative: Interpreted By:  Betsy Castaneda,   STUDY:  CT CERVICAL SPINE WO IV CONTRAST;  12/31/2023 10:41 am      INDICATION:  Signs/Symptoms:fall.      COMPARISON:  06/23/2022      ACCESSION NUMBER(S):  [id number]      ORDERING CLINICIAN:  ELIUD BACK      TECHNIQUE:  CT images were obtained through the cervical spine. Sagittal and  coronal reconstructions were generated.      FINDINGS:          ALIGNMENT:  Mild leftward tilt in the upper cervical spine. Trace anterolisthesis  at C3-4 and C4-5. Straightening of the lower cervical lordosis.      VERTEBRAE/DISC SPACES:  No compression deformity or acute displaced fracture.  Severe  atlantoaxial joint space narrowing with heterogeneity of the dens and  vague surrounding calcifications. Multilevel degenerative endplate  spurring. C5-6 and C6-7 intervertebral disc space narrowing with  endplate sclerosis. Multilevel bilateral facet joint narrowing and  spurring.      ADDITIONAL FINDINGS:  No abnormal thickening of the prevertebral soft tissues.  Partial  opacification of right mastoid air cells and visualized maxillary  sinuses as noted on accompanying head CT.      Impression: No cervical vertebral  compression deformity or acute displaced  fracture. Multilevel degenerative changes with straightening of the  lower cervical lordosis and mild spondylolisthesis.      Partial opacification of the visualized maxillary sinuses and right  mastoid air cells.      MACRO:  None.      Signed by: Betsy Castaneda 12/31/2023 10:59 AM  Dictation workstation:   [id number]  CT head wo IV contrast  Narrative: Interpreted By:  Betsy Castaneda,   STUDY:  CT HEAD WO IV CONTRAST;  12/31/2023 10:41 am      INDICATION:  Signs/Symptoms:syncope.      COMPARISON:  08/27/2023      ACCESSION NUMBER(S):  [id number]      ORDERING CLINICIAN:  ELIUD BACK      TECHNIQUE:  Unenhanced CT images of the head were obtained.      FINDINGS:  The ventricles, cisterns and sulci are prominent, consistent with  diffuse volume loss. There are areas of nonspecific white matter  hypodensity, which are probably age related or microvascular in  nature. These findings are similar to the previous exam. There is no  acute intracranial hemorrhage, mass effect or midline shift. No  extraaxial fluid collection.      No acute displaced calvarial fracture.      Mucosal thickening/partial opacification of left ethmoid and  bilateral maxillary sinuses and right mastoid air cells with small  air-fluid levels in both maxillary sinuses.      Impression: No acute intracranial hemorrhage or mass-effect.      Partial opacification of maxillary and left ethmoid sinuses and right  mastoid air cells.      MACRO:  None.      Signed by: Betsy Castaneda 12/31/2023 10:55 AM  Dictation workstation:   [id number]  XR chest 1 view  Narrative: STUDY:  Chest Radiograph;  12/31/2023 at 9:52 AM  INDICATION:  Syncope.  COMPARISON:  07/27/21 XR Chest  ACCESSION NUMBER(S):  [id number]  ORDERING CLINICIAN:  ELIUD RAINES WIRE  TECHNIQUE:  Frontal chest (two images) was obtained at 09:52 hours.  FINDINGS:  CARDIOMEDIASTINAL SILHOUETTE:  Stable top normal heart size.  Cardiac pacemaker wires remain  in place  as well as a loop recorder.     LUNGS:  Lungs are clear.     ABDOMEN:  No remarkable upper abdominal findings.     BONES:  Prominent arthritic changes of the left glenohumeral joint again seen.  Impression: No acute cardiopulmonary abnormality.  No interval change.  Signed by Russell Murray MD      Physical Exam  Constitutional:       General: She is not in acute distress.     Appearance: She is ill-appearing.   HENT:      Head: Normocephalic and atraumatic.      Right Ear: External ear normal.      Left Ear: External ear normal.      Nose: Nose normal. No rhinorrhea.      Mouth/Throat:      Mouth: Mucous membranes are moist.      Pharynx: Oropharynx is clear. No oropharyngeal exudate.   Eyes:      General: No scleral icterus.        Right eye: No discharge.         Left eye: No discharge.      Conjunctiva/sclera: Conjunctivae normal.   Cardiovascular:      Rate and Rhythm: Normal rate and regular rhythm.      Pulses: Normal pulses.      Heart sounds: Normal heart sounds. No murmur heard.  Pulmonary:      Effort: Pulmonary effort is normal. No respiratory distress.      Breath sounds: Normal breath sounds. No wheezing or rales.   Abdominal:      General: Abdomen is flat. There is no distension.      Palpations: Abdomen is soft.      Tenderness: There is no abdominal tenderness.   Musculoskeletal:         General: No tenderness.      Right lower leg: No edema.      Left lower leg: No edema.   Skin:     General: Skin is warm and dry.      Capillary Refill: Capillary refill takes less than 2 seconds.      Findings: No rash.   Neurological:      General: No focal deficit present.      Mental Status: She is alert and oriented to person, place, and time. Mental status is at baseline.   Psychiatric:         Mood and Affect: Mood normal.         Behavior: Behavior normal.         Thought Content: Thought content normal.         Judgment: Judgment normal.     Relevant Results               Assessment/Plan                   Principal Problem:    Influenza A  Active Problems:    Paroxysmal atrial fibrillation (CMS/[facility])    Syncope    Chronic kidney disease, stage 3b (CMS/[facility])    Type 2 diabetes mellitus, without long-term current use of insulin (CMS/[facility])    Andreia Camejo is a 89 y.o. female with a history of multiple medical problems including chronic kidney disease stage IV, chronic ischemic heart disease, pulmonary embolism, atrial fibrillation, and diabetes mellitus who presented to the emergency department on December 31 after passing out at home.         Syncope  -Hemodynamically stable satting well on room air  -CBC unremarkable  -BMP normal electrolytes serum creatinine 1.3  -Most likely on the basis of dehydration in the setting of decreased p.o. intake with acute illness  -Sudden onset without prodrome suggest need to rule out arrhythmia, monitor on telemetry with continuous pulse oximetry  -Check serial troponins to ensure no acute ischemic event  -Check echocardiogram  -Patient responded well to fluid bolus in ED and blood pressure now 140s.  Continue gentle hydration at 70 mL/h for 10 hours of 0.9% normal saline, monitoring oxygenation closely to ensure no compromise  -Orthostatic study ordered  -Cardiology consulted recommendations are appreciated     Influenza A  -Will initiate patient on renal dosed Tamiflu based on current creatinine clearance of 27 mL/min.  Start 30 mg daily.  -There is no evidence of complicating bacterial infection at this point.  However monitor for fevers or hemodynamic compromise.  No evidence of infiltrate on chest x-ray.     Chronic kidney disease stage IIIb  -Serum creatinine around baseline  -Creatinine appears widely varied based on prior labs.  She appears within range of her baseline with GFR 32.  -Continue to monitor renal function during hospitalization and avoid nephrotoxins.     Diabetes mellitus  -Not on any home medications for this issue  -Most recent A1c shows  suboptimal control at 8.3%  -Add sliding scale insulin Humalog and monitor blood sugars  -Diabetic diet with hypoglycemia protocols     Atrial fibrillation  -Continue apixaban and carvedilol with hold parameters for the latter  -Change apixaban to 2.5 mg BID based on current creatinine clearance  -Monitor for RVR on telemetry     Coronary artery disease  -Continue home carvedilol, rosuvastatin, clopidogrel  -Rule out acute ischemia as noted above  -Monitor on telemetry     Disposition  -Pending improvement of symptoms pending PT OT evaluation    Lisa Guerra MD

## 2024-01-01 NOTE — PROGRESS NOTES
01/01/24 1415   Discharge Planning   Living Arrangements Alone   Support Systems Children   Assistance Needed Patient is A&O X3, on room air at baseline, is independent with most ADLs and uses a cane and walker for ambulation at home. Per daughter, she lives [place]door to the patient and assists her as needed. Patient also has a private duty bath aide that comes twice per week as well as a house keeper. PT/OT pending to determine next site of care.   Type of Residence Private residence   Number of Stairs to Enter Residence 2   Number of Stairs Within Residence 0   Do you have animals or pets at home? No   Who is requesting discharge planning? Provider   Home or Post Acute Services Other (Comment)  (TBD)   Patient expects to be discharged to: TBD- PT/OT evaluations pending   Does the patient need discharge transport arranged? Yes   RoundTrip coordination needed? Yes   Has discharge transport been arranged? No   Financial Resource Strain   How hard is it for you to pay for the very basics like food, housing, medical care, and heating? Not hard   Housing Stability   In the last 12 months, was there a time when you were not able to pay the mortgage or rent on time? N   In the last 12 months, how many places have you lived? 1   In the last 12 months, was there a time when you did not have a steady place to sleep or slept in a shelter (including now)? N   Transportation Needs   In the past 12 months, has lack of transportation kept you from medical appointments or from getting medications? no   In the past 12 months, has lack of transportation kept you from meetings, work, or from getting things needed for daily living? No   Patient Choice   Provider Choice list and CMS website (https://medicare.gov/care-compare#search) for post-acute Quality and Resource Measure Data were provided and reviewed with: Patient;Family   Patient / Family choosing to utilize agency / facility established prior to hospitalization No

## 2024-01-01 NOTE — CARE PLAN
The clinical goals for the shift include keep safe and maintain comfort.    Over the shift, the patient did make progress toward the following goals. Recommendations include frequent vital checks as well as bed alarm active and bed alarm within reach

## 2024-01-01 NOTE — CARE PLAN
Patient will be weaned off o2 during shift and will have no signs of chest pain during shift. Patient will have no respiratory distress during shift.

## 2024-01-02 LAB
ANION GAP SERPL CALC-SCNC: 11 MMOL/L (ref 10–20)
AORTIC VALVE MEAN GRADIENT: 7
AORTIC VALVE PEAK VELOCITY: 1.55
AV PEAK GRADIENT: 9.6
AVA (PEAK VEL): 1.47
AVA (VTI): 1.31
BASOPHILS # BLD AUTO: 0.03 X10*3/UL (ref 0–0.1)
BASOPHILS NFR BLD AUTO: 0.4 %
BUN SERPL-MCNC: 27 MG/DL (ref 6–23)
CALCIUM SERPL-MCNC: 8 MG/DL (ref 8.6–10.3)
CARDIAC TROPONIN I PNL SERPL HS: 16 NG/L (ref 0–13)
CHLORIDE SERPL-SCNC: 103 MMOL/L (ref 98–107)
CO2 SERPL-SCNC: 24 MMOL/L (ref 21–32)
CREAT SERPL-MCNC: 1.06 MG/DL (ref 0.5–1.05)
EOSINOPHIL # BLD AUTO: 0.02 X10*3/UL (ref 0–0.4)
EOSINOPHIL NFR BLD AUTO: 0.3 %
ERYTHROCYTE [DISTWIDTH] IN BLOOD BY AUTOMATED COUNT: 13 % (ref 11.5–14.5)
GFR SERPL CREATININE-BSD FRML MDRD: 49 ML/MIN/1.73M*2
GLUCOSE BLD MANUAL STRIP-MCNC: 186 MG/DL (ref 74–99)
GLUCOSE BLD MANUAL STRIP-MCNC: 192 MG/DL (ref 74–99)
GLUCOSE BLD MANUAL STRIP-MCNC: 203 MG/DL (ref 74–99)
GLUCOSE BLD MANUAL STRIP-MCNC: 222 MG/DL (ref 74–99)
GLUCOSE BLD MANUAL STRIP-MCNC: 254 MG/DL (ref 74–99)
GLUCOSE SERPL-MCNC: 235 MG/DL (ref 74–99)
HCT VFR BLD AUTO: 42.4 % (ref 36–46)
HGB BLD-MCNC: 12.9 G/DL (ref 12–16)
IMM GRANULOCYTES # BLD AUTO: 0.03 X10*3/UL (ref 0–0.5)
IMM GRANULOCYTES NFR BLD AUTO: 0.4 % (ref 0–0.9)
LEFT VENTRICLE INTERNAL DIMENSION DIASTOLE: 3.02 (ref 3.5–6)
LEFT VENTRICULAR OUTFLOW TRACT DIAMETER: 2.3
LYMPHOCYTES # BLD AUTO: 1.3 X10*3/UL (ref 0.8–3)
LYMPHOCYTES NFR BLD AUTO: 17.2 %
MCH RBC QN AUTO: 33.6 PG (ref 26–34)
MCHC RBC AUTO-ENTMCNC: 30.4 G/DL (ref 32–36)
MCV RBC AUTO: 110 FL (ref 80–100)
MITRAL VALVE E/A RATIO: 0.57
MONOCYTES # BLD AUTO: 1.01 X10*3/UL (ref 0.05–0.8)
MONOCYTES NFR BLD AUTO: 13.4 %
NEUTROPHILS # BLD AUTO: 5.16 X10*3/UL (ref 1.6–5.5)
NEUTROPHILS NFR BLD AUTO: 68.3 %
NRBC BLD-RTO: 0 /100 WBCS (ref 0–0)
PLATELET # BLD AUTO: 129 X10*3/UL (ref 150–450)
POTASSIUM SERPL-SCNC: 4.1 MMOL/L (ref 3.5–5.3)
RBC # BLD AUTO: 3.84 X10*6/UL (ref 4–5.2)
RIGHT VENTRICLE PEAK SYSTOLIC PRESSURE: 6.6
SODIUM SERPL-SCNC: 134 MMOL/L (ref 136–145)
WBC # BLD AUTO: 7.6 X10*3/UL (ref 4.4–11.3)

## 2024-01-02 PROCEDURE — 2500000004 HC RX 250 GENERAL PHARMACY W/ HCPCS (ALT 636 FOR OP/ED): Performed by: INTERNAL MEDICINE

## 2024-01-02 PROCEDURE — 82947 ASSAY GLUCOSE BLOOD QUANT: CPT

## 2024-01-02 PROCEDURE — 1200000002 HC GENERAL ROOM WITH TELEMETRY DAILY

## 2024-01-02 PROCEDURE — 97165 OT EVAL LOW COMPLEX 30 MIN: CPT | Mod: GO

## 2024-01-02 PROCEDURE — 2500000001 HC RX 250 WO HCPCS SELF ADMINISTERED DRUGS (ALT 637 FOR MEDICARE OP): Performed by: INTERNAL MEDICINE

## 2024-01-02 PROCEDURE — 36415 COLL VENOUS BLD VENIPUNCTURE: CPT | Performed by: INTERNAL MEDICINE

## 2024-01-02 PROCEDURE — 85025 COMPLETE CBC W/AUTO DIFF WBC: CPT | Performed by: INTERNAL MEDICINE

## 2024-01-02 PROCEDURE — 2500000002 HC RX 250 W HCPCS SELF ADMINISTERED DRUGS (ALT 637 FOR MEDICARE OP, ALT 636 FOR OP/ED): Performed by: INTERNAL MEDICINE

## 2024-01-02 PROCEDURE — 2500000001 HC RX 250 WO HCPCS SELF ADMINISTERED DRUGS (ALT 637 FOR MEDICARE OP): Performed by: NURSE PRACTITIONER

## 2024-01-02 PROCEDURE — 82374 ASSAY BLOOD CARBON DIOXIDE: CPT | Performed by: INTERNAL MEDICINE

## 2024-01-02 PROCEDURE — 97161 PT EVAL LOW COMPLEX 20 MIN: CPT | Mod: GP

## 2024-01-02 PROCEDURE — 99233 SBSQ HOSP IP/OBS HIGH 50: CPT | Performed by: INTERNAL MEDICINE

## 2024-01-02 PROCEDURE — 84484 ASSAY OF TROPONIN QUANT: CPT | Performed by: INTERNAL MEDICINE

## 2024-01-02 RX ORDER — POLYETHYLENE GLYCOL 3350 17 G/17G
17 POWDER, FOR SOLUTION ORAL 2 TIMES DAILY
Status: DISCONTINUED | OUTPATIENT
Start: 2024-01-02 | End: 2024-01-04 | Stop reason: HOSPADM

## 2024-01-02 RX ADMIN — ACETAMINOPHEN 975 MG: 325 TABLET ORAL at 09:05

## 2024-01-02 RX ADMIN — Medication 2000 UNITS: at 09:05

## 2024-01-02 RX ADMIN — INSULIN LISPRO 2 UNITS: 100 INJECTION, SOLUTION INTRAVENOUS; SUBCUTANEOUS at 09:05

## 2024-01-02 RX ADMIN — APIXABAN 2.5 MG: 2.5 TABLET, FILM COATED ORAL at 09:05

## 2024-01-02 RX ADMIN — CARVEDILOL 3.12 MG: 3.12 TABLET, FILM COATED ORAL at 18:07

## 2024-01-02 RX ADMIN — ROSUVASTATIN CALCIUM 10 MG: 10 TABLET, FILM COATED ORAL at 21:19

## 2024-01-02 RX ADMIN — POLYETHYLENE GLYCOL 3350 17 G: 17 POWDER, FOR SOLUTION ORAL at 15:16

## 2024-01-02 RX ADMIN — POLYETHYLENE GLYCOL 3350 17 G: 17 POWDER, FOR SOLUTION ORAL at 21:20

## 2024-01-02 RX ADMIN — INSULIN LISPRO 2 UNITS: 100 INJECTION, SOLUTION INTRAVENOUS; SUBCUTANEOUS at 13:13

## 2024-01-02 RX ADMIN — CARVEDILOL 3.12 MG: 3.12 TABLET, FILM COATED ORAL at 09:05

## 2024-01-02 RX ADMIN — APIXABAN 5 MG: 5 TABLET, FILM COATED ORAL at 21:19

## 2024-01-02 RX ADMIN — OSELTAMIVIR PHOSPHATE 30 MG: 30 CAPSULE ORAL at 09:36

## 2024-01-02 RX ADMIN — INSULIN LISPRO 2 UNITS: 100 INJECTION, SOLUTION INTRAVENOUS; SUBCUTANEOUS at 18:05

## 2024-01-02 RX ADMIN — ACETAMINOPHEN 975 MG: 325 TABLET ORAL at 21:19

## 2024-01-02 RX ADMIN — GABAPENTIN 300 MG: 300 CAPSULE ORAL at 09:05

## 2024-01-02 RX ADMIN — GABAPENTIN 300 MG: 300 CAPSULE ORAL at 21:19

## 2024-01-02 RX ADMIN — CLOPIDOGREL 75 MG: 75 TABLET ORAL at 09:05

## 2024-01-02 ASSESSMENT — COGNITIVE AND FUNCTIONAL STATUS - GENERAL
DRESSING REGULAR LOWER BODY CLOTHING: A LITTLE
EATING MEALS: A LITTLE
WALKING IN HOSPITAL ROOM: A LITTLE
TOILETING: A LOT
TURNING FROM BACK TO SIDE WHILE IN FLAT BAD: A LITTLE
HELP NEEDED FOR BATHING: A LITTLE
HELP NEEDED FOR BATHING: A LITTLE
DAILY ACTIVITIY SCORE: 17
CLIMB 3 TO 5 STEPS WITH RAILING: A LITTLE
MOBILITY SCORE: 22
MOBILITY SCORE: 17
CLIMB 3 TO 5 STEPS WITH RAILING: TOTAL
TOILETING: A LITTLE
DAILY ACTIVITIY SCORE: 18
HELP NEEDED FOR BATHING: A LITTLE
DRESSING REGULAR LOWER BODY CLOTHING: A LITTLE
MOVING TO AND FROM BED TO CHAIR: A LITTLE
TURNING FROM BACK TO SIDE WHILE IN FLAT BAD: A LITTLE
STANDING UP FROM CHAIR USING ARMS: A LITTLE
DRESSING REGULAR UPPER BODY CLOTHING: A LITTLE
MOVING FROM LYING ON BACK TO SITTING ON SIDE OF FLAT BED WITH BEDRAILS: A LITTLE
STANDING UP FROM CHAIR USING ARMS: A LITTLE
DAILY ACTIVITIY SCORE: 21
CLIMB 3 TO 5 STEPS WITH RAILING: A LOT
WALKING IN HOSPITAL ROOM: A LITTLE
DRESSING REGULAR LOWER BODY CLOTHING: A LITTLE
PERSONAL GROOMING: A LITTLE
DRESSING REGULAR UPPER BODY CLOTHING: A LITTLE
MOBILITY SCORE: 16
TOILETING: A LOT
MOVING FROM LYING ON BACK TO SITTING ON SIDE OF FLAT BED WITH BEDRAILS: A LITTLE
WALKING IN HOSPITAL ROOM: A LITTLE
MOVING TO AND FROM BED TO CHAIR: A LITTLE
PERSONAL GROOMING: A LITTLE

## 2024-01-02 ASSESSMENT — ENCOUNTER SYMPTOMS
CHILLS: 0
ABDOMINAL PAIN: 0
FEVER: 0
COUGH: 1
WEAKNESS: 1
PALPITATIONS: 0

## 2024-01-02 ASSESSMENT — PAIN SCALES - GENERAL
PAINLEVEL_OUTOF10: 5 - MODERATE PAIN
PAINLEVEL_OUTOF10: 0 - NO PAIN
PAINLEVEL_OUTOF10: 0 - NO PAIN

## 2024-01-02 ASSESSMENT — PAIN - FUNCTIONAL ASSESSMENT
PAIN_FUNCTIONAL_ASSESSMENT: 0-10
PAIN_FUNCTIONAL_ASSESSMENT: 0-10

## 2024-01-02 NOTE — PROGRESS NOTES
Andreia Carreon is a 89 y.o. female on day 2 of admission presenting with Influenza A.      Subjective     Seen and examined, dizziness improving, no new complaints.  No overnight events.  Patient requested to be seen by cardiology.  The plan discussed with the patient and RN.       Objective     Last Recorded Vitals  /72   Pulse 66   Temp 36.3 °C (97.3 °F)   Resp 18   Wt 86.2 kg (190 lb)   SpO2 94%   Intake/Output last 3 Shifts:    Intake/Output Summary (Last 24 hours) at 1/2/2024 1145  Last data filed at 1/1/2024 1735  Gross per 24 hour   Intake 340 ml   Output 250 ml   Net 90 ml         Admission Weight  Weight: 86.2 kg (190 lb) (12/31/23 0934)    Daily Weight  12/31/23 : 86.2 kg (190 lb)    Image Results  Transthoracic Echo (TTE) [facility], [address] [place] [address]                Tel [phone] and Fax [phone]    TRANSTHORACIC ECHOCARDIOGRAM REPORT       Patient Name:      ANDREIA CARREON     Reading Physician:    [id number] Garcia Suarez MD  Study Date:        1/1/2024             Ordering Provider:    [id number] DEVORAH CADET  MRN/PID:           [id number]             Fellow:  Accession#:        [id number]         Nurse:                Sherri Clifford RN  Date of Birth/Age: 9/24/1929 / 89 years Sonographer:          Kavita Reynoso RDCS  Gender:            F                    Additional Staff:  Height:            172.72 cm            Admit Date:           12/31/2023  Weight:            86.18 kg             Admission Status:     Inpatient -                                                                Routine  BSA:               2.00 m2              Encounter#:           [id number]                                          Department Location:  [facility]                                                                 Invasive  Blood Pressure: 120 /79 mmHg    Study Type:    TRANSTHORACIC ECHO (TTE) COMPLETE  Diagnosis/ICD: Syncope-R55  Indication:    Syncope  CPT Code:      Echo Complete w Full Doppler-12259    Patient History:  Pertinent History: Elev. BNP, CKD, DM.    Study Detail: The following Echo studies were performed: 2D, M-Mode, Doppler and                color flow. Technically challenging study due to body habitus,                patient lying in supine position and prominent lung artifact.                Definity used as a contrast agent for endocardial border                definition. Total contrast used for this procedure was 5 mL via IV                push. Unable to obtain apical 4-chamber, apical 2-chamber, apical                long, LA, RA, RV and subcostal view. Patient has a pacemaker.                The patient was awake.       PHYSICIAN INTERPRETATION:  Left Ventricle: The left ventricular systolic function is normal, with an estimated ejection fraction of 60-65%. The left ventricular cavity size was not assessed. Spectral Doppler shows an impaired relaxation pattern of left ventricular diastolic filling.  Left Atrium: The left atrium was not well visualized.  Right Ventricle: The right ventricle is normal in size. There is normal right ventricular global systolic function.  Right Atrium: The right atrium was not well visualized.  Aortic Valve: The aortic valve was not well visualized. There is mild to moderate aortic valve cusp calcification. There is evidence of mild aortic valve stenosis.  There is indeterminate aortic valve regurgitation. The peak instantaneous gradient of the aortic valve is 9.6 mmHg. The mean gradient of the aortic valve is 7.0 mmHg.  Mitral Valve: The mitral valve was not well visualized. There is trace mitral valve regurgitation.  Tricuspid Valve: The tricuspid valve was not well visualized. There is trace to mild tricuspid  regurgitation. The right ventricular systolic pressure is unable to be estimated.  Pulmonic Valve: The pulmonic valve is not well visualized. The pulmonic valve regurgitation was not well visualized.  Pericardium: There is no pericardial effusion noted.  Aorta: The aortic root is normal.  Systemic Veins: The inferior vena cava was not well visualized.  In comparison to the previous echocardiogram(s): Compared with study from 2021, no significant change. Poor image quality.       CONCLUSIONS:   1. Left ventricular systolic function is normal with a 60-65% estimated ejection fraction.   2. Poorly visualized anatomical structures due to suboptimal image quality.   3. Spectral Doppler shows an impaired relaxation pattern of left ventricular diastolic filling.   4. Mild aortic valve stenosis.    QUANTITATIVE DATA SUMMARY:  2D MEASUREMENTS:                           Normal Ranges:  IVSd:          1.45 cm   (0.6-1.1cm)  LVPWd:         1.14 cm   (0.6-1.1cm)  LVIDd:         3.02 cm   (3.9-5.9cm)  LVIDs:         2.17 cm  LV Mass Index: 62.3 g/m2  LV % FS        28.1 %    AORTA MEASUREMENTS:                     Normal Ranges:  Asc Ao, d: 3.50 cm (2.1-3.4cm)    LV DIASTOLIC FUNCTION:                      Normal Ranges:  MV Peak E: 0.50 m/s (0.7-1.2 m/s)  MV Peak A: 0.88 m/s (0.42-0.7 m/s)  E/A Ratio: 0.57     (1.0-2.2)    MITRAL VALVE:                  Normal Ranges:  MV DT: 149 msec (150-240msec)    AORTIC VALVE:                                    Normal Ranges:  AoV Vmax:                1.55 m/s (<=1.7m/s)  AoV Peak P.6 mmHg (<20mmHg)  AoV Mean P.0 mmHg (1.7-11.5mmHg)  LVOT Max Maxwell:            0.55 m/s (<=1.1m/s)  AoV VTI:                 31.80 cm (18-25cm)  LVOT VTI:                10.00 cm  LVOT Diameter:           2.30 cm  (1.8-2.4cm)  AoV Area, VTI:           1.31 cm2 (2.5-5.5cm2)  AoV Area,Vmax:           1.47 cm2 (2.5-4.5cm2)  AoV Dimensionless Index: 0.31    TRICUSPID VALVE/RVSP:                              Normal Ranges:  Peak TR Velocity: 0.95 m/s  RV Syst Pressure: 6.6 mmHg (< 30mmHg)       [id number] Garcia Suarez MD  Electronically signed on 1/2/2024 at 11:01:14 AM       ** Final **      Physical Exam  Constitutional:       General: She is not in acute distress.     Appearance: She is ill-appearing.   HENT:      Head: Normocephalic and atraumatic.      Right Ear: External ear normal.      Left Ear: External ear normal.      Nose: Nose normal. No rhinorrhea.      Mouth/Throat:      Mouth: Mucous membranes are moist.      Pharynx: Oropharynx is clear. No oropharyngeal exudate.   Eyes:      General: No scleral icterus.        Right eye: No discharge.         Left eye: No discharge.      Conjunctiva/sclera: Conjunctivae normal.   Cardiovascular:      Rate and Rhythm: Normal rate and regular rhythm.      Pulses: Normal pulses.      Heart sounds: Normal heart sounds. No murmur heard.  Pulmonary:      Effort: Pulmonary effort is normal. No respiratory distress.      Breath sounds: Normal breath sounds. No wheezing or rales.   Abdominal:      General: Abdomen is flat. There is no distension.      Palpations: Abdomen is soft.      Tenderness: There is no abdominal tenderness.   Musculoskeletal:         General: No tenderness.      Right lower leg: No edema.      Left lower leg: No edema.   Skin:     General: Skin is warm and dry.      Capillary Refill: Capillary refill takes less than 2 seconds.      Findings: No rash.   Neurological:      General: No focal deficit present.      Mental Status: She is alert and oriented to person, place, and time. Mental status is at baseline.   Psychiatric:         Mood and Affect: Mood normal.         Behavior: Behavior normal.         Thought Content: Thought content normal.         Judgment: Judgment normal.     Relevant Results  Results for orders placed or performed during the hospital encounter of 12/31/23 (from the past 24 hour(s))   POCT GLUCOSE   Result Value  Ref Range    POCT Glucose 204 (H) 74 - 99 mg/dL   Troponin I, High Sensitivity   Result Value Ref Range    Troponin I, High Sensitivity 16 (H) 0 - 13 ng/L   POCT GLUCOSE   Result Value Ref Range    POCT Glucose 192 (H) 74 - 99 mg/dL   POCT GLUCOSE   Result Value Ref Range    POCT Glucose 203 (H) 74 - 99 mg/dL   POCT GLUCOSE   Result Value Ref Range    POCT Glucose 254 (H) 74 - 99 mg/dL   CBC and Auto Differential   Result Value Ref Range    WBC 7.6 4.4 - 11.3 x10*3/uL    nRBC 0.0 0.0 - 0.0 /100 WBCs    RBC 3.84 (L) 4.00 - 5.20 x10*6/uL    Hemoglobin 12.9 12.0 - 16.0 g/dL    Hematocrit 42.4 36.0 - 46.0 %     (H) 80 - 100 fL    MCH 33.6 26.0 - 34.0 pg    MCHC 30.4 (L) 32.0 - 36.0 g/dL    RDW 13.0 11.5 - 14.5 %    Platelets 129 (L) 150 - 450 x10*3/uL    Neutrophils % 68.3 40.0 - 80.0 %    Immature Granulocytes %, Automated 0.4 0.0 - 0.9 %    Lymphocytes % 17.2 13.0 - 44.0 %    Monocytes % 13.4 2.0 - 10.0 %    Eosinophils % 0.3 0.0 - 6.0 %    Basophils % 0.4 0.0 - 2.0 %    Neutrophils Absolute 5.16 1.60 - 5.50 x10*3/uL    Immature Granulocytes Absolute, Automated 0.03 0.00 - 0.50 x10*3/uL    Lymphocytes Absolute 1.30 0.80 - 3.00 x10*3/uL    Monocytes Absolute 1.01 (H) 0.05 - 0.80 x10*3/uL    Eosinophils Absolute 0.02 0.00 - 0.40 x10*3/uL    Basophils Absolute 0.03 0.00 - 0.10 x10*3/uL   Basic metabolic panel   Result Value Ref Range    Glucose 235 (H) 74 - 99 mg/dL    Sodium 134 (L) 136 - 145 mmol/L    Potassium 4.1 3.5 - 5.3 mmol/L    Chloride 103 98 - 107 mmol/L    Bicarbonate 24 21 - 32 mmol/L    Anion Gap 11 10 - 20 mmol/L    Urea Nitrogen 27 (H) 6 - 23 mg/dL    Creatinine 1.06 (H) 0.50 - 1.05 mg/dL    eGFR 49 (L) >60 mL/min/1.73m*2    Calcium 8.0 (L) 8.6 - 10.3 mg/dL                   Principal Problem:    Influenza A  Active Problems:    Paroxysmal atrial fibrillation (CMS/HCC)    Syncope    Chronic kidney disease, stage 3b (CMS/HCC)    Type 2 diabetes mellitus, without long-term current use of insulin  (CMS/[facility])    Andreia Camejo is a 89 y.o. female with a history of multiple medical problems including chronic kidney disease stage IV, chronic ischemic heart disease, pulmonary embolism, atrial fibrillation, and diabetes mellitus who presented to the emergency department on December 31 after passing out at home.         Syncope  -Hemodynamically stable satting well on NC  -CBC unremarkable  -BMP normal electrolytes serum creatinine 1.3  -Most likely on the basis of dehydration in the setting of decreased p.o. intake with acute illness  -Sudden onset without prodrome suggest need to rule out arrhythmia, monitor on telemetry with continuous pulse oximetry  -Patient responded well to fluid bolus in ED and blood pressure now 140s.  Continue gentle hydration at 70 mL/h for 10 hours of 0.9% normal saline, monitoring oxygenation closely to ensure no compromise  -Orthostatic study positive yesterday  -Echo 1/1/2024 showed ejection fraction 60 to 65% with mild aortic stenosis  -Cardiology consulted recommendations are appreciated     Influenza A  -Will initiate patient on renal dosed Tamiflu based on current creatinine clearance of 27 mL/min.  Start 30 mg daily.  -There is no evidence of complicating bacterial infection at this point.  However monitor for fevers or hemodynamic compromise.  No evidence of infiltrate on chest x-ray.     Chronic kidney disease stage IIIb  -Serum creatinine around baseline  -Creatinine appears widely varied based on prior labs.  She appears within range of her baseline with GFR 32.  -Continue to monitor renal function during hospitalization and avoid nephrotoxins.     Diabetes mellitus  -Not on any home medications for this issue  -Most recent A1c shows suboptimal control at 8.3%  -Add sliding scale insulin Humalog and monitor blood sugars  -Diabetic diet with hypoglycemia protocols     Atrial fibrillation  -Continue apixaban and carvedilol with hold parameters for the latter  -Change apixaban  to 2.5 mg BID based on current creatinine clearance  -Monitor for RVR on telemetry     Coronary artery disease  -Continue home carvedilol, rosuvastatin, clopidogrel  -Rule out acute ischemia as noted above  -Monitor on telemetry     Disposition  -Pending improvement of symptoms pending, PT OT evaluation, cardiology recommendations.    Lisa Guerra MD

## 2024-01-02 NOTE — PROGRESS NOTES
Occupational Therapy    Evaluation    Patient Name: Andreia Camejo  MRN: [id number]  Today's Date: 1/2/2024  Time Calculation  Start Time: 1325  Stop Time: 1345  Time Calculation (min): 20 min    Assessment  IP OT Assessment  End of Session Communication: Bedside nurse  End of Session Patient Position: Up in chair, Alarm on  Plan:  Treatment Interventions: ADL retraining, Functional transfer training, UE strengthening/ROM, Endurance training, Compensatory technique education  OT Frequency: 3 times per week  OT Discharge Recommendations: Moderate intensity level of continued care  OT - OK to Discharge: Yes (per OT POC)    Subjective     General:  General  Reason for Referral: 89 YOF admitted with flu symptoms  Referred By: KWAME Guerra  Past Medical History Relevant to Rehab: PMH: CKD IV, PE, Afib, DM, Yojana, CTR, Lumbar spine stenosis, TIA  Co-Treatment: PT  Co-Treatment Reason: to maximize pt outcomes  Prior to Session Communication: Bedside nurse  Patient Position Received: Bed, 3 rail up  General Comment: Pleasant and agreeable to evaluation, eager to get OOB although feeling fatigued. 1.5 L O2, purewick  Precautions:  Medical Precautions: Fall precautions  Precautions Comment: Droplet (+) influenza    Pain:  Pain Assessment  Pain Assessment: 0-10  Pain Score: 0 - No pain    Objective   Cognition:  Overall Cognitive Status: Within Functional Limits           Home Living:  Type of Home: House  Lives With: Alone (dtr and family across the driveway)  Home Adaptive Equipment:  (Rollator)  Home Layout: One level  Home Access: Stairs to enter with rails  Entrance Stairs-Rails: Both  Entrance Stairs-Number of Steps: 3  Bathroom Shower/Tub: Walk-in shower  Home Living Comments: Shower seat, grab bars and RTS reported in the home   Prior Function:  Prior Function Comments: Pt receives assistance for showering and light housework 2x/weekly, dtr or other family assist with transportation and occasional meals    ADL:  LE  Dressing Assistance: Minimal  LE Dressing Deficit: Pull up over hips  Activity Tolerance:  Endurance: Decreased tolerance for upright activites  Bed Mobility/Transfers: Bed Mobility 1  Bed Mobility 1: Supine to sitting  Level of Assistance 1: Close supervision  Bed Mobility Comments 1: HOB elevated    Transfer 1  Technique 1: Sit to stand, Stand to sit  Transfer Device 1: Walker  Transfer Level of Assistance 1: Minimum assistance  Trials/Comments 1: poor safety awareness with placement of hands on FWW, despite cues      Ambulation/Gait Training:  Ambulation/Gait Training  Ambulation/Gait Training Performed:  (x3 steps from bed>bedside chair with Jessica and FWW)    Strength:  Strength Comments: BUE functionally 3+/5    Outcome Measures: [facility] Daily Activity  Putting on and taking off regular lower body clothing: A little  Bathing (including washing, rinsing, drying): A little  Putting on and taking off regular upper body clothing: A little  Toileting, which includes using toilet, bedpan or urinal: A lot  Taking care of personal grooming such as brushing teeth: A little  Eating Meals: None  Daily Activity - Total Score: 18      Education Documentation  ADL Training, taught by Mckenzie Clifford, OT at 1/2/2024  3:15 PM.  Learner: Patient  Readiness: Acceptance  Method: Explanation  Response: Needs Reinforcement    Goals:   Encounter Problems            OT Goals       Pt will demo functional transfers to/ from EOB, chair and commode mod I and LRD (Progressing)       Start:  01/02/24    Expected End:  01/16/24            Pt will demo functional transfers to/ from EOB, chair and commode mod I and LRD (Progressing)       Start:  01/02/24    Expected End:  01/16/24            Pt will demo ADL routine and meaningful daily activities indep using modifications as needed  (Progressing)       Start:  01/02/24    Expected End:  01/16/24            Pt will demo improved activity tolerance evidenced by participation in BADL and/or  functional mobility x10 mins with </=1 rest break.  (Progressing)       Start:  01/02/24    Expected End:  01/16/24            Pt will demo improved activity tolerance evidenced by participation in BADL and/or functional mobility x10 mins with </=1 rest break.   (Progressing)       Start:  01/02/24    Expected End:  01/16/24

## 2024-01-02 NOTE — PROGRESS NOTES
Physical Therapy    Physical Therapy Evaluation    Patient Name: Andreia Camejo  MRN: [id number]  Today's Date: 1/2/2024   Time Calculation  Start Time: 1324  Stop Time: 1344  Time Calculation (min): 20 min    Assessment/Plan   PT Assessment  PT Assessment Results: Decreased range of motion, Decreased strength, Decreased endurance, Impaired balance  Rehab Prognosis: Good  Medical Staff Made Aware: Yes  End of Session Communication: Bedside nurse  Assessment Comment: pt demonstrates decresed endurance, decresaed functional mobility, transfers, gait, balance and general strength. Recommend continued skilled PT to address impairments.  End of Session Patient Position: Bed, 3 rail up  IP OR SWING BED PT PLAN  Inpatient or Swing Bed: Inpatient  PT Plan  Treatment/Interventions: Bed mobility, Transfer training, Gait training, Balance training, Strengthening  PT Plan: Skilled PT  PT Frequency: 3 times per week  PT Discharge Recommendations: Moderate intensity level of continued care  PT Recommended Transfer Status: Assist x1  PT - OK to Discharge: Yes (per PT POC)      Subjective   General Visit Information:  General  Reason for Referral: 89 YOF admitted with flu symptoms  Referred By: KWAME Guerra  Past Medical History Relevant to Rehab: PMH: CKD IV, PE, Afib, DM, Yojana, CTR, Lumbar spine stenosis, TIA  Co-Treatment: OT  Co-Treatment Reason: necessary to maximize pt function and maintain safety  Prior to Session Communication: Bedside nurse  Patient Position Received: Bed, 3 rail up  General Comment: pt sitting up in bed, finishing lunch at this time. Cleared for session, agreeable to participate. Fatigue and dizziness noted with decresaed tolerance for upright tasks. 1.5L O2, purewick in place.  Home Living:  Home Living  Type of Home: House  Lives With: Alone (dtr lives across driveway)  Home Adaptive Equipment:  (rollator , cane)  Home Layout: One level  Home Access: Stairs to enter with rails  Entrance Stairs-Rails:  Both  Entrance Stairs-Number of Steps: 3  Bathroom Shower/Tub: Walk-in shower (with shower seat)  Prior Level of Function:  Prior Function Per Pt/Caregiver Report  Level of [place]:  (assist for showering 2x weekly, assist for IADLs, indep with ambulation using rollator)  Precautions:  Precautions  Medical Precautions:  (Fall Precautions, droplet d/t Influenza)  Vital Signs:       Objective   Pain:     Cognition:  Cognition  Overall Cognitive Status:  (at least oriented to self and situation as assessed in conversation)    General Assessments:       Activity Tolerance  Endurance: Decreased tolerance for upright activites         Strength  Strength Comments: B hips 4/5, B knees 4 to 4+/5  Strength  Strength Comments: B hips 4/5, B knees 4 to 4+/5        Static Standing Balance  Static Standing-Balance Support: Bilateral upper extremity supported  Static Standing-Level of Assistance: Contact guard  Static Standing-Comment/Number of Minutes: 1 minute  Functional Assessments:       Bed Mobility  Bed Mobility: Yes  Bed Mobility 1  Bed Mobility 1: Supine to sitting  Level of Assistance 1: Close supervision  Bed Mobility Comments 1: HOB elevated, increased time to complete    Transfers  Transfer: Yes  Transfer 1  Transfer From 1: Sit to  Transfer to 1: Stand  Technique 1: Sit to stand, Stand to sit  Transfer Device 1: Walker  Transfer Level of Assistance 1: Minimum assistance  Trials/Comments 1: cues for hand placement, pt uses FWW to assist with stand despite cues.    Ambulation/Gait Training  Ambulation/Gait Training Performed: Yes  Ambulation/Gait Training 1  Surface 1: Level tile  Device 1: Rolling walker  Assistance 1: Minimum assistance  Comments/Distance (ft) 1: 3 feet bed to chair, limited by pt fatigue and dizziness.    Outcome Measures:  [facility] Basic Mobility  Turning from your back to your side while in a flat bed without using bedrails: A little  Moving from lying on your back to sitting on the side of a  flat bed without using bedrails: A little  Moving to and from bed to chair (including a wheelchair): A little  Standing up from a chair using your arms (e.g. wheelchair or bedside chair): A little  To walk in hospital room: A little  Climbing 3-5 steps with railing: Total  Basic Mobility - Total Score: 16    Encounter Problems       Encounter Problems (Active)       Balance       Pt will demo static/dynamic standing balance x5+ minutes with B to U UE support and SB/s to improve stability and decrease falls        Start:  01/02/24    Expected End:  01/16/24               Mobility       X50+ feet with/without use of DME and Sb/Sassist  necessary to initiate return to PLOF        Start:  01/02/24    Expected End:  01/16/24            Pt will completed bed mobility with S/I necessary for homegoing         Start:  01/02/24    Expected End:  01/16/24            X15+ reps ther ex to increase general strength and improve functional independence         Start:  01/02/24    Expected End:  01/16/24               Transfers       Pt will complete all functional transfers with Sb/S necessary to initiate return to PLOF         Start:  01/02/24    Expected End:  01/16/24                   Education Documentation  Mobility Training, taught by Beata Nolan, PT at 1/2/2024  2:30 PM.  Learner: Patient  Readiness: Acceptance  Method: Explanation  Response: Verbalizes Understanding    Education Comments  No comments found.

## 2024-01-02 NOTE — CONSULTS
Inpatient consult to Cardiology  Consult performed by: VELMA Corral-CNP  Consult ordered by: Yennifer Zuniga PA-C  Reason for consult: syncope          History Of Present Illness  Andreia Camejo is a 89 y.o. female presenting with syncope. She states she was ambulating to the bathroom when she passed out and her daughter was able to get her to the toilet to sit down. She reports an increase in a moist cough and weakness but denies any dizziness.     In the ER lab work showed glucose 232, potassium 4.4, BUN/Cr 23/1.49, , troponin 35, INR 1.5, WBC 9.0, H&H 13.7/41.6, positive influenza A, CXR negative, CT head negative.    EKG showed SR with 1st degree AVB. BP 97/77, pulse ox 90-96% on room air.     She received IVF and was admitted for further care.     Past Medical History  Past Medical History:   Diagnosis Date    Acute cystitis without hematuria 05/10/2017    Acute cystitis without hematuria    Allergic rhinitis due to pollen 08/18/2017    Acute seasonal allergic rhinitis due to pollen    Body mass index (BMI) 28.0-28.9, adult 10/16/2018    BMI 28.0-28.9,adult    Body mass index (BMI) 29.0-29.9, adult 05/14/2020    BMI 29.0-29.9,adult    Cervicalgia 09/13/2013    Cervicalgia    Chronic ischemic heart disease, unspecified 08/14/2013    Chronic myocardial ischemia    Chronic kidney disease, stage 4 (severe) (CMS/[facility]) 11/18/2020    CKD (chronic kidney disease), stage IV    Flushing 08/28/2014    Flushing    Hereditary and idiopathic neuropathy, unspecified 08/14/2013    Hereditary and idiopathic neuropathy    Other acute sinusitis 12/15/2020    Other acute sinusitis, recurrence not specified    Other conditions influencing health status 03/26/2021    History of burning on urination    Other pulmonary embolism without acute cor pulmonale (CMS/[facility]) 05/25/2022    Multiple pulmonary emboli    Other specified disorders of eustachian tube, right ear 10/04/2019    Dysfunction of right eustachian tube     Personal history of other diseases of the digestive system 12/02/2014    History of constipation    Personal history of other diseases of the musculoskeletal system and connective tissue 05/25/2022    History of polymyalgia rheumatica    Personal history of other diseases of urinary system 08/14/2013    History of pyelonephritis    Personal history of other specified conditions 06/12/2017    History of bruising easily    Personal history of other specified conditions 06/28/2013    History of syncope    Personal history of other specified conditions 01/04/2021    History of confusion    Personal history of other specified conditions 08/28/2014    History of excessive sweating    Personal history of other venous thrombosis and embolism 05/25/2022    History of deep venous thrombosis    Presence of other cardiac implants and grafts 09/12/2017    Status post placement of implantable loop recorder    Pyuria 01/04/2021    Pyuria    Shingles 05/08/2023    Suspected COVID-19 virus infection 05/08/2023    Syncope and collapse 11/24/2016    Near syncope    Urinary tract infection 05/08/2023   Coronary artery disease with stents on August 18, 2011, at [facility] to the LAD, LISANDRA of RCA in 2013 (pt has 3 stents in all), hypertension, hyperlipidemia, polymyalgia rheumatica, paroxysmal atrial fibrillation on Eliquis.  She has a history of DVT and pulmonary embolus s/p IVC filter.  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 SSS s/p Medtronic PCM    Surgical History  Past Surgical History:   Procedure Laterality Date    APPENDECTOMY  08/14/2013    Appendectomy    CARPAL TUNNEL RELEASE  05/22/2013    Neuroplasty Decompression Median Nerve At Carpal Tunnel    CATARACT EXTRACTION  08/14/2013    Cataract Surgery    CHOLECYSTECTOMY  08/14/2013     Cholecystectomy    COLONOSCOPY  08/14/2013    Complete Colonoscopy    CYSTOSCOPY  08/14/2013    Diagnostic Cystoscopy    DILATION AND CURETTAGE OF UTERUS  08/14/2013    Dilation And Curettage    MR HEAD ANGIO WO IV CONTRAST  11/14/2016    MR HEAD ANGIO WO IV CONTRAST 11/14/2016 [facility]    MR NECK ANGIO WO IV CONTRAST  11/14/2016    MR NECK ANGIO WO IV CONTRAST 11/14/2016 [facility]    OTHER SURGICAL HISTORY  05/22/2013    Neuroplasty With Transposition Of Ulnar Nerve    OTHER SURGICAL HISTORY  05/22/2013    Excision Of Lesion Fingers    OTHER SURGICAL HISTORY  05/22/2013    Osteotomy For Phalangeal Deformity Of Finger    OTHER SURGICAL HISTORY  09/07/2013    Cath Placement Of Stent 3    OTHER SURGICAL HISTORY  09/18/2013    Cardiac Cath Procedure Outcome: Successful    OTHER SURGICAL HISTORY  08/14/2013    Cath Stent 1 Stenosis    OTHER SURGICAL HISTORY  08/14/2013    Cath Stent 2 Initial    TONSILLECTOMY  08/14/2013    Tonsillectomy    TOTAL HIP ARTHROPLASTY  08/14/2013    Hip Replacement    TUBAL LIGATION  08/14/2013    Tubal Ligation    VARICOSE VEIN SURGERY  08/14/2013    Varicose Vein Ligation        Social History  She reports that she has never smoked. She has never used smokeless tobacco. She reports that she does not drink alcohol and does not use drugs.    Family History  No family history on file.     Allergies  Influenza virus vaccine whole, Iodinated contrast media, Lisinopril, Moxifloxacin, Penicillin, Penicillins, Red dye, and Adhesive tape-silicones    Review of Systems  Review of Systems   Constitutional:  Negative for chills and fever.   Respiratory:  Positive for cough.    Cardiovascular:  Negative for chest pain, palpitations and leg swelling.   Gastrointestinal:  Negative for abdominal pain.   Neurological:  Positive for syncope and weakness.   All other systems reviewed and are negative.         Physical Exam  Constitutional: Well developed, awake/alert/oriented x3, no  "distress, alert and cooperative  Eyes: PERRL, EOMI, clear sclera  ENMT: mucous membranes moist, no apparent injury, no lesions seen  Head/Neck: Neck supple, no apparent injury, thyroid without mass or tenderness, No JVD, trachea midline, no bruits  Respiratory/Thorax: Patent airways, fine rales RLL with good chest expansion, thorax symmetric  Cardiovascular: Regular, rate and rhythm, 1/6 systolic murmur, 2+ equal pulses of the extremities, normal S 1and S 2  Gastrointestinal: Nondistended, soft, non-tender, no rebound tenderness or guarding, no masses palpable, no organomegaly, +BS, no bruits  Musculoskeletal: ROM intact, no joint swelling, normal strength  Extremities: normal extremities, no cyanosis edema, contusions or wounds, no clubbing  Neurological: alert and oriented x3, intact senses, motor, response and reflexes, normal strength  Lymphatic: No significant lymphadenopathy  Psychological: Appropriate mood and behavior  Skin: Warm and dry, no lesions, no rashes       Last Recorded Vitals  Blood pressure 127/72, pulse 66, temperature 36.3 °C (97.3 °F), resp. rate 18, height 1.727 m (5' 8\"), weight 86.2 kg (190 lb), SpO2 94 %.    Relevant Results    Scheduled medications  acetaminophen, 975 mg, oral, BID  apixaban, 2.5 mg, oral, q12h  carvedilol, 3.125 mg, oral, BID with meals  cholecalciferol, 2,000 Units, oral, Daily  clopidogrel, 75 mg, oral, Daily  gabapentin, 300 mg, oral, BID  insulin lispro, 0-5 Units, subcutaneous, TID with meals  oseltamivir, 30 mg, oral, Daily  perflutren protein A microsphere, 0.5 mL, intravenous, Once in imaging  polyethylene glycol, 17 g, oral, BID  rosuvastatin, 10 mg, oral, Nightly  sulfur hexafluoride microsphr, 2 mL, intravenous, Once in imaging      Continuous medications     PRN medications  PRN medications: acetaminophen **OR** acetaminophen **OR** acetaminophen, dextromethorphan-guaifenesin, dextrose 10 % in water (D10W), dextrose, glucagon, nitroglycerin, oxygen    Results " for orders placed or performed during the hospital encounter of 12/31/23 (from the past 24 hour(s))   POCT GLUCOSE   Result Value Ref Range    POCT Glucose 204 (H) 74 - 99 mg/dL   POCT GLUCOSE   Result Value Ref Range    POCT Glucose 204 (H) 74 - 99 mg/dL   Troponin I, High Sensitivity   Result Value Ref Range    Troponin I, High Sensitivity 16 (H) 0 - 13 ng/L   POCT GLUCOSE   Result Value Ref Range    POCT Glucose 192 (H) 74 - 99 mg/dL   POCT GLUCOSE   Result Value Ref Range    POCT Glucose 203 (H) 74 - 99 mg/dL   POCT GLUCOSE   Result Value Ref Range    POCT Glucose 254 (H) 74 - 99 mg/dL   CBC and Auto Differential   Result Value Ref Range    WBC 7.6 4.4 - 11.3 x10*3/uL    nRBC 0.0 0.0 - 0.0 /100 WBCs    RBC 3.84 (L) 4.00 - 5.20 x10*6/uL    Hemoglobin 12.9 12.0 - 16.0 g/dL    Hematocrit 42.4 36.0 - 46.0 %     (H) 80 - 100 fL    MCH 33.6 26.0 - 34.0 pg    MCHC 30.4 (L) 32.0 - 36.0 g/dL    RDW 13.0 11.5 - 14.5 %    Platelets 129 (L) 150 - 450 x10*3/uL    Neutrophils % 68.3 40.0 - 80.0 %    Immature Granulocytes %, Automated 0.4 0.0 - 0.9 %    Lymphocytes % 17.2 13.0 - 44.0 %    Monocytes % 13.4 2.0 - 10.0 %    Eosinophils % 0.3 0.0 - 6.0 %    Basophils % 0.4 0.0 - 2.0 %    Neutrophils Absolute 5.16 1.60 - 5.50 x10*3/uL    Immature Granulocytes Absolute, Automated 0.03 0.00 - 0.50 x10*3/uL    Lymphocytes Absolute 1.30 0.80 - 3.00 x10*3/uL    Monocytes Absolute 1.01 (H) 0.05 - 0.80 x10*3/uL    Eosinophils Absolute 0.02 0.00 - 0.40 x10*3/uL    Basophils Absolute 0.03 0.00 - 0.10 x10*3/uL   Basic metabolic panel   Result Value Ref Range    Glucose 235 (H) 74 - 99 mg/dL    Sodium 134 (L) 136 - 145 mmol/L    Potassium 4.1 3.5 - 5.3 mmol/L    Chloride 103 98 - 107 mmol/L    Bicarbonate 24 21 - 32 mmol/L    Anion Gap 11 10 - 20 mmol/L    Urea Nitrogen 27 (H) 6 - 23 mg/dL    Creatinine 1.06 (H) 0.50 - 1.05 mg/dL    eGFR 49 (L) >60 mL/min/1.73m*2    Calcium 8.0 (L) 8.6 - 10.3 mg/dL       Transthoracic Echo (TTE)  Complete   Final Result      CT head wo IV contrast   Final Result   No acute intracranial hemorrhage or mass-effect.        Partial opacification of maxillary and left ethmoid sinuses and right   mastoid air cells.        MACRO:   None.        Signed by: Betsy Castaneda 12/31/2023 10:55 AM   Dictation workstation:   [id number]      CT cervical spine wo IV contrast   Final Result   No cervical vertebral compression deformity or acute displaced   fracture. Multilevel degenerative changes with straightening of the   lower cervical lordosis and mild spondylolisthesis.        Partial opacification of the visualized maxillary sinuses and right   mastoid air cells.        MACRO:   None.        Signed by: Betsy Castaneda 12/31/2023 10:59 AM   Dictation workstation:   [id number]      XR chest 1 view   Final Result   No acute cardiopulmonary abnormality.  No interval change.   Signed by Russell Murray MD      Cardiac Device Check - Inpatient    (Results Pending)       Transthoracic Echo (TTE) Complete    Result Date: 1/2/2024   [facility], [address] [place] [address]               Tel [phone] and Fax [phone] TRANSTHORACIC ECHOCARDIOGRAM REPORT  Patient Name:      SHARLENE CARERON     Reading Physician:    [id number] Garcia Suarez MD Study Date:        1/1/2024             Ordering Provider:    [id number] DEVORAH CADET MRN/PID:           [id number]             Fellow: Accession#:        [id number]         Nurse:                Sherri Clifford RN Date of Birth/Age: 9/24/1929 / 89 years Sonographer:          Kavita Reynoso RDCS Gender:            F                    Additional Staff: Height:            172.72 cm            Admit Date:           12/31/2023 Weight:            86.18 kg             Admission Status:      Inpatient -                                                               Routine BSA:               2.00 m2              Encounter#:           [id number]                                         Department Location:  [facility] Non                                                               Invasive Blood Pressure: 120 /79 mmHg Study Type:    TRANSTHORACIC ECHO (TTE) COMPLETE Diagnosis/ICD: Syncope-R55 Indication:    Syncope CPT Code:      Echo Complete w Full [id number] Patient History: Pertinent History: Elev. BNP, CKD, DM. Study Detail: The following Echo studies were performed: 2D, M-Mode, Doppler and               color flow. Technically challenging study due to body habitus,               patient lying in supine position and prominent lung artifact.               Definity used as a contrast agent for endocardial border               definition. Total contrast used for this procedure was 5 mL via IV               push. Unable to obtain apical 4-chamber, apical 2-chamber, apical               long, LA, RA, RV and subcostal view. Patient has a pacemaker.               The patient was awake.  PHYSICIAN INTERPRETATION: Left Ventricle: The left ventricular systolic function is normal, with an estimated ejection fraction of 60-65%. The left ventricular cavity size was not assessed. Spectral Doppler shows an impaired relaxation pattern of left ventricular diastolic filling. Left Atrium: The left atrium was not well visualized. Right Ventricle: The right ventricle is normal in size. There is normal right ventricular global systolic function. Right Atrium: The right atrium was not well visualized. Aortic Valve: The aortic valve was not well visualized. There is mild to moderate aortic valve cusp calcification. There is evidence of mild aortic valve stenosis. There is indeterminate aortic valve regurgitation. The peak instantaneous gradient of the aortic valve is 9.6 mmHg. The mean gradient of the aortic valve is  7.0 mmHg. Mitral Valve: The mitral valve was not well visualized. There is trace mitral valve regurgitation. Tricuspid Valve: The tricuspid valve was not well visualized. There is trace to mild tricuspid regurgitation. The right ventricular systolic pressure is unable to be estimated. Pulmonic Valve: The pulmonic valve is not well visualized. The pulmonic valve regurgitation was not well visualized. Pericardium: There is no pericardial effusion noted. Aorta: The aortic root is normal. Systemic Veins: The inferior vena cava was not well visualized. In comparison to the previous echocardiogram(s): Compared with study from 2021, no significant change. Poor image quality.  CONCLUSIONS:  1. Left ventricular systolic function is normal with a 60-65% estimated ejection fraction.  2. Poorly visualized anatomical structures due to suboptimal image quality.  3. Spectral Doppler shows an impaired relaxation pattern of left ventricular diastolic filling.  4. Mild aortic valve stenosis. QUANTITATIVE DATA SUMMARY: 2D MEASUREMENTS:                          Normal Ranges: IVSd:          1.45 cm   (0.6-1.1cm) LVPWd:         1.14 cm   (0.6-1.1cm) LVIDd:         3.02 cm   (3.9-5.9cm) LVIDs:         2.17 cm LV Mass Index: 62.3 g/m2 LV % FS        28.1 % AORTA MEASUREMENTS:                    Normal Ranges: Asc Ao, d: 3.50 cm (2.1-3.4cm) LV DIASTOLIC FUNCTION:                     Normal Ranges: MV Peak E: 0.50 m/s (0.7-1.2 m/s) MV Peak A: 0.88 m/s (0.42-0.7 m/s) E/A Ratio: 0.57     (1.0-2.2) MITRAL VALVE:                 Normal Ranges: MV DT: 149 msec (150-240msec) AORTIC VALVE:                                   Normal Ranges: AoV Vmax:                1.55 m/s (<=1.7m/s) AoV Peak P.6 mmHg (<20mmHg) AoV Mean P.0 mmHg (1.7-11.5mmHg) LVOT Max Maxwell:            0.55 m/s (<=1.1m/s) AoV VTI:                 31.80 cm (18-25cm) LVOT VTI:                10.00 cm LVOT Diameter:           2.30 cm  (1.8-2.4cm) AoV  Area, VTI:           1.31 cm2 (2.5-5.5cm2) AoV Area,Vmax:           1.47 cm2 (2.5-4.5cm2) AoV Dimensionless Index: 0.31 TRICUSPID VALVE/RVSP:                            Normal Ranges: Peak TR Velocity: 0.95 m/s RV Syst Pressure: 6.6 mmHg (< 30mmHg)  [id number] Garcia Suarez MD Electronically signed on 1/2/2024 at 11:01:14 AM  ** Final **         Assessment/Plan   Syncope  -Most likely from acute infection with influenza and dehydration  -BP low on arrival  -Creatinine elevated->improved with IVF  -Troponin downtrending  -CT head negative  -Orthostatic VS negative  -Echo as above, LVEF normal, mild AS  -PCM interrogated, normal functioning, no arrhythmias noted  -Received IVF  -EKG reviewed, SR with 1st degree AVB  -Monitor on tele    2. Elevated troponin-Non MI elevation 2/2 influenza  -Hx of CAD s/p PCI/LISANDRA x2 LAD in 2011, PCI/LISANDRA x1 to RCA 2013   -Troponin downtrending  -I reviewed the EKG, no acute changes, ST elevation, or depression  -Echo as above  -Cont plavix, coreg and statin    3. Paroxysmal atrial fibrillation  -Currently SR  -Cont eliquis for CVA prevention->increase to 5mg BID  -Cont coreg for rate control  -PCM interrogated, no arrhythmias  -Monitor on tele    4. Influenza  -Tamiflu  -Oxygen support    5. Hypertension with hypotension on admit  -now stable  -2gm na diet  -cont meds  -monitor     6. Aortic stenosis  -Mild per echo  -Cont routine echos     7. Pacemaker in situ  -Medtronic-MRI compatible  -Personally interrogated as above     8. Acute on chronic kidney disease  -from dehydration  -Creatinine 1.4->1.09  -Improved with IVF  -Baseline appears to be around 1.2      Myranda Lizarraga, APRN-CNP

## 2024-01-03 LAB
ANION GAP SERPL CALC-SCNC: 12 MMOL/L (ref 10–20)
BASOPHILS # BLD AUTO: 0.02 X10*3/UL (ref 0–0.1)
BASOPHILS NFR BLD AUTO: 0.4 %
BUN SERPL-MCNC: 25 MG/DL (ref 6–23)
CALCIUM SERPL-MCNC: 8.2 MG/DL (ref 8.6–10.3)
CARDIAC TROPONIN I PNL SERPL HS: 12 NG/L (ref 0–13)
CHLORIDE SERPL-SCNC: 103 MMOL/L (ref 98–107)
CO2 SERPL-SCNC: 25 MMOL/L (ref 21–32)
CREAT SERPL-MCNC: 1.01 MG/DL (ref 0.5–1.05)
EOSINOPHIL # BLD AUTO: 0.09 X10*3/UL (ref 0–0.4)
EOSINOPHIL NFR BLD AUTO: 1.7 %
ERYTHROCYTE [DISTWIDTH] IN BLOOD BY AUTOMATED COUNT: 12.7 % (ref 11.5–14.5)
GFR SERPL CREATININE-BSD FRML MDRD: 52 ML/MIN/1.73M*2
GLUCOSE BLD MANUAL STRIP-MCNC: 128 MG/DL (ref 74–99)
GLUCOSE BLD MANUAL STRIP-MCNC: 180 MG/DL (ref 74–99)
GLUCOSE BLD MANUAL STRIP-MCNC: 208 MG/DL (ref 74–99)
GLUCOSE BLD MANUAL STRIP-MCNC: 226 MG/DL (ref 74–99)
GLUCOSE SERPL-MCNC: 125 MG/DL (ref 74–99)
HCT VFR BLD AUTO: 38.6 % (ref 36–46)
HGB BLD-MCNC: 12.7 G/DL (ref 12–16)
IMM GRANULOCYTES # BLD AUTO: 0.02 X10*3/UL (ref 0–0.5)
IMM GRANULOCYTES NFR BLD AUTO: 0.4 % (ref 0–0.9)
LYMPHOCYTES # BLD AUTO: 1.44 X10*3/UL (ref 0.8–3)
LYMPHOCYTES NFR BLD AUTO: 27 %
MAGNESIUM SERPL-MCNC: 1.69 MG/DL (ref 1.6–2.4)
MCH RBC QN AUTO: 33.3 PG (ref 26–34)
MCHC RBC AUTO-ENTMCNC: 32.9 G/DL (ref 32–36)
MCV RBC AUTO: 101 FL (ref 80–100)
MONOCYTES # BLD AUTO: 0.75 X10*3/UL (ref 0.05–0.8)
MONOCYTES NFR BLD AUTO: 14 %
NEUTROPHILS # BLD AUTO: 3.02 X10*3/UL (ref 1.6–5.5)
NEUTROPHILS NFR BLD AUTO: 56.5 %
NRBC BLD-RTO: 0 /100 WBCS (ref 0–0)
PLATELET # BLD AUTO: 122 X10*3/UL (ref 150–450)
POTASSIUM SERPL-SCNC: 4.3 MMOL/L (ref 3.5–5.3)
RBC # BLD AUTO: 3.81 X10*6/UL (ref 4–5.2)
SODIUM SERPL-SCNC: 136 MMOL/L (ref 136–145)
WBC # BLD AUTO: 5.3 X10*3/UL (ref 4.4–11.3)

## 2024-01-03 PROCEDURE — 2500000001 HC RX 250 WO HCPCS SELF ADMINISTERED DRUGS (ALT 637 FOR MEDICARE OP): Performed by: NURSE PRACTITIONER

## 2024-01-03 PROCEDURE — 83735 ASSAY OF MAGNESIUM: CPT | Performed by: INTERNAL MEDICINE

## 2024-01-03 PROCEDURE — 2500000004 HC RX 250 GENERAL PHARMACY W/ HCPCS (ALT 636 FOR OP/ED): Performed by: INTERNAL MEDICINE

## 2024-01-03 PROCEDURE — 80048 BASIC METABOLIC PNL TOTAL CA: CPT | Performed by: INTERNAL MEDICINE

## 2024-01-03 PROCEDURE — 85025 COMPLETE CBC W/AUTO DIFF WBC: CPT | Performed by: INTERNAL MEDICINE

## 2024-01-03 PROCEDURE — 2500000001 HC RX 250 WO HCPCS SELF ADMINISTERED DRUGS (ALT 637 FOR MEDICARE OP): Performed by: INTERNAL MEDICINE

## 2024-01-03 PROCEDURE — 82947 ASSAY GLUCOSE BLOOD QUANT: CPT

## 2024-01-03 PROCEDURE — 99233 SBSQ HOSP IP/OBS HIGH 50: CPT | Performed by: INTERNAL MEDICINE

## 2024-01-03 PROCEDURE — 84484 ASSAY OF TROPONIN QUANT: CPT | Performed by: INTERNAL MEDICINE

## 2024-01-03 PROCEDURE — 36415 COLL VENOUS BLD VENIPUNCTURE: CPT | Performed by: INTERNAL MEDICINE

## 2024-01-03 PROCEDURE — 1200000002 HC GENERAL ROOM WITH TELEMETRY DAILY

## 2024-01-03 PROCEDURE — 2500000002 HC RX 250 W HCPCS SELF ADMINISTERED DRUGS (ALT 637 FOR MEDICARE OP, ALT 636 FOR OP/ED): Performed by: INTERNAL MEDICINE

## 2024-01-03 RX ADMIN — POLYETHYLENE GLYCOL 3350 17 G: 17 POWDER, FOR SOLUTION ORAL at 08:05

## 2024-01-03 RX ADMIN — ACETAMINOPHEN 975 MG: 325 TABLET ORAL at 20:54

## 2024-01-03 RX ADMIN — APIXABAN 5 MG: 5 TABLET, FILM COATED ORAL at 08:05

## 2024-01-03 RX ADMIN — CARVEDILOL 3.12 MG: 3.12 TABLET, FILM COATED ORAL at 08:05

## 2024-01-03 RX ADMIN — ROSUVASTATIN CALCIUM 10 MG: 10 TABLET, FILM COATED ORAL at 20:54

## 2024-01-03 RX ADMIN — CLOPIDOGREL 75 MG: 75 TABLET ORAL at 08:05

## 2024-01-03 RX ADMIN — GABAPENTIN 300 MG: 300 CAPSULE ORAL at 20:54

## 2024-01-03 RX ADMIN — INSULIN LISPRO 2 UNITS: 100 INJECTION, SOLUTION INTRAVENOUS; SUBCUTANEOUS at 12:49

## 2024-01-03 RX ADMIN — INSULIN LISPRO 2 UNITS: 100 INJECTION, SOLUTION INTRAVENOUS; SUBCUTANEOUS at 17:34

## 2024-01-03 RX ADMIN — OSELTAMIVIR PHOSPHATE 30 MG: 30 CAPSULE ORAL at 08:05

## 2024-01-03 RX ADMIN — ACETAMINOPHEN 975 MG: 325 TABLET ORAL at 08:05

## 2024-01-03 RX ADMIN — GABAPENTIN 300 MG: 300 CAPSULE ORAL at 08:05

## 2024-01-03 RX ADMIN — APIXABAN 5 MG: 5 TABLET, FILM COATED ORAL at 20:54

## 2024-01-03 RX ADMIN — CARVEDILOL 3.12 MG: 3.12 TABLET, FILM COATED ORAL at 17:34

## 2024-01-03 RX ADMIN — Medication 2000 UNITS: at 08:05

## 2024-01-03 ASSESSMENT — COGNITIVE AND FUNCTIONAL STATUS - GENERAL
HELP NEEDED FOR BATHING: A LITTLE
TURNING FROM BACK TO SIDE WHILE IN FLAT BAD: A LITTLE
TOILETING: A LITTLE
DAILY ACTIVITIY SCORE: 20
WALKING IN HOSPITAL ROOM: A LITTLE
MOVING TO AND FROM BED TO CHAIR: A LITTLE
CLIMB 3 TO 5 STEPS WITH RAILING: A LOT
MOVING FROM LYING ON BACK TO SITTING ON SIDE OF FLAT BED WITH BEDRAILS: A LITTLE
DRESSING REGULAR UPPER BODY CLOTHING: A LITTLE
DRESSING REGULAR LOWER BODY CLOTHING: A LITTLE
STANDING UP FROM CHAIR USING ARMS: A LITTLE
MOBILITY SCORE: 17

## 2024-01-03 ASSESSMENT — PAIN SCALES - GENERAL: PAINLEVEL_OUTOF10: 0 - NO PAIN

## 2024-01-03 ASSESSMENT — PAIN - FUNCTIONAL ASSESSMENT: PAIN_FUNCTIONAL_ASSESSMENT: 0-10

## 2024-01-03 NOTE — PROGRESS NOTES
01/03/24 1319   Discharge Planning   Living Arrangements Alone   Support Systems Children   Assistance Needed Patient is A&O X3, on room air at baseline, is independent with most ADLs and uses a cane and walker for ambulation at home. Per daughter, she lives [place] to the patient and assists her as needed. Patient also has a private duty bath aide that comes twice per week as well as a house keeper. PT/OT pending to determine next site of care.   Type of Residence Private residence   Number of Stairs to Enter Residence 2   Number of Stairs Within Residence 0   Do you have animals or pets at home? No   Who is requesting discharge planning? Provider   Home or Post Acute Services Post acute facilities (Rehab/SNF/etc)   Type of Post Acute Facility Services Skilled nursing   Patient expects to be discharged to: PT/OT recomendations are for moderate intensity, PAQN list provided to the patient will need facility preferences.   Does the patient need discharge transport arranged? Yes   RoundTrip coordination needed? Yes   Has discharge transport been arranged? No   Patient Choice   Provider Choice list and CMS website (https://medicare.gov/care-compare#search) for post-acute Quality and Resource Measure Data were provided and reviewed with: Family;Patient   Patient / Family choosing to utilize agency / facility established prior to hospitalization No

## 2024-01-03 NOTE — CARE PLAN
The patient's goals for the shift include  breathing better with movement.     The clinical goals for the shift include patient will remain free rom falls and injury    Patient had uneventful shift.She was in the chair for part of the shift.

## 2024-01-03 NOTE — PROGRESS NOTES
Andreia Carreon is a 89 y.o. female on day 3 of admission presenting with Influenza A.      Subjective     Seen and examined, dizziness improving, no new complaints.  No overnight events.    The plan discussed with the patient and RN.       Objective     Last Recorded Vitals  /70   Pulse 65   Temp 37.2 °C (99 °F)   Resp 16   Wt 86.2 kg (190 lb)   SpO2 97%   Intake/Output last 3 Shifts:    Intake/Output Summary (Last 24 hours) at 1/3/2024 1242  Last data filed at 1/3/2024 0428  Gross per 24 hour   Intake 180 ml   Output 600 ml   Net -420 ml         Admission Weight  Weight: 86.2 kg (190 lb) (12/31/23 0934)    Daily Weight  12/31/23 : 86.2 kg (190 lb)    Image Results  ECG 12 lead  Sinus rhythm with 1st degree AV block  Left axis deviation  Left ventricular hypertrophy with repolarization abnormality  Abnormal ECG  When compared with ECG of 27-AUG-2023 21:41,  Previous ECG has undetermined rhythm, needs review  Transthoracic Echo (TTE) [facility], [address] [place] [address]                Tel [phone] and Fax [phone]    TRANSTHORACIC ECHOCARDIOGRAM REPORT       Patient Name:      ANDREIA CARREON     Reading Physician:    [id number] Garcia Suarez MD  Study Date:        1/1/2024             Ordering Provider:    [id number] DEVORAH CADET  MRN/PID:           [id number]             Fellow:  Accession#:        [id number]         Nurse:                Sherri Clifford RN  Date of Birth/Age: 9/24/1929 / 89 years Sonographer:          Kavita Reynoso RDCS  Gender:            F                    Additional Staff:  Height:            172.72 cm            Admit Date:           12/31/2023  Weight:            86.18 kg             Admission Status:     Inpatient -                                                                 Routine  BSA:               2.00 m2              Encounter#:           [id number]                                          Department Location:  [facility] Non                                                                Invasive  Blood Pressure: 120 /79 mmHg    Study Type:    TRANSTHORACIC ECHO (TTE) COMPLETE  Diagnosis/ICD: Syncope-R55  Indication:    Syncope  CPT Code:      Echo Complete w Full Doppler-51802    Patient History:  Pertinent History: Elev. BNP, CKD, DM.    Study Detail: The following Echo studies were performed: 2D, M-Mode, Doppler and                color flow. Technically challenging study due to body habitus,                patient lying in supine position and prominent lung artifact.                Definity used as a contrast agent for endocardial border                definition. Total contrast used for this procedure was 5 mL via IV                push. Unable to obtain apical 4-chamber, apical 2-chamber, apical                long, LA, RA, RV and subcostal view. Patient has a pacemaker.                The patient was awake.       PHYSICIAN INTERPRETATION:  Left Ventricle: The left ventricular systolic function is normal, with an estimated ejection fraction of 60-65%. The left ventricular cavity size was not assessed. Spectral Doppler shows an impaired relaxation pattern of left ventricular diastolic filling.  Left Atrium: The left atrium was not well visualized.  Right Ventricle: The right ventricle is normal in size. There is normal right ventricular global systolic function.  Right Atrium: The right atrium was not well visualized.  Aortic Valve: The aortic valve was not well visualized. There is mild to moderate aortic valve cusp calcification. There is evidence of mild aortic valve stenosis.  There is indeterminate aortic valve regurgitation. The peak instantaneous gradient of the aortic valve is 9.6 mmHg. The mean gradient of the aortic valve is 7.0  mmHg.  Mitral Valve: The mitral valve was not well visualized. There is trace mitral valve regurgitation.  Tricuspid Valve: The tricuspid valve was not well visualized. There is trace to mild tricuspid regurgitation. The right ventricular systolic pressure is unable to be estimated.  Pulmonic Valve: The pulmonic valve is not well visualized. The pulmonic valve regurgitation was not well visualized.  Pericardium: There is no pericardial effusion noted.  Aorta: The aortic root is normal.  Systemic Veins: The inferior vena cava was not well visualized.  In comparison to the previous echocardiogram(s): Compared with study from 2021, no significant change. Poor image quality.       CONCLUSIONS:   1. Left ventricular systolic function is normal with a 60-65% estimated ejection fraction.   2. Poorly visualized anatomical structures due to suboptimal image quality.   3. Spectral Doppler shows an impaired relaxation pattern of left ventricular diastolic filling.   4. Mild aortic valve stenosis.    QUANTITATIVE DATA SUMMARY:  2D MEASUREMENTS:                           Normal Ranges:  IVSd:          1.45 cm   (0.6-1.1cm)  LVPWd:         1.14 cm   (0.6-1.1cm)  LVIDd:         3.02 cm   (3.9-5.9cm)  LVIDs:         2.17 cm  LV Mass Index: 62.3 g/m2  LV % FS        28.1 %    AORTA MEASUREMENTS:                     Normal Ranges:  Asc Ao, d: 3.50 cm (2.1-3.4cm)    LV DIASTOLIC FUNCTION:                      Normal Ranges:  MV Peak E: 0.50 m/s (0.7-1.2 m/s)  MV Peak A: 0.88 m/s (0.42-0.7 m/s)  E/A Ratio: 0.57     (1.0-2.2)    MITRAL VALVE:                  Normal Ranges:  MV DT: 149 msec (150-240msec)    AORTIC VALVE:                                    Normal Ranges:  AoV Vmax:                1.55 m/s (<=1.7m/s)  AoV Peak P.6 mmHg (<20mmHg)  AoV Mean P.0 mmHg (1.7-11.5mmHg)  LVOT Max Maxwell:            0.55 m/s (<=1.1m/s)  AoV VTI:                 31.80 cm (18-25cm)  LVOT VTI:                10.00  cm  LVOT Diameter:           2.30 cm  (1.8-2.4cm)  AoV Area, VTI:           1.31 cm2 (2.5-5.5cm2)  AoV Area,Vmax:           1.47 cm2 (2.5-4.5cm2)  AoV Dimensionless Index: 0.31    TRICUSPID VALVE/RVSP:                             Normal Ranges:  Peak TR Velocity: 0.95 m/s  RV Syst Pressure: 6.6 mmHg (< 30mmHg)       [id number] Garcia Suarez MD  Electronically signed on 1/2/2024 at 11:01:14 AM       ** Final **      Physical Exam  Constitutional:       General: She is not in acute distress.     Appearance: She is ill-appearing.   HENT:      Head: Normocephalic and atraumatic.      Right Ear: External ear normal.      Left Ear: External ear normal.      Nose: Nose normal. No rhinorrhea.      Mouth/Throat:      Mouth: Mucous membranes are moist.      Pharynx: Oropharynx is clear. No oropharyngeal exudate.   Eyes:      General: No scleral icterus.        Right eye: No discharge.         Left eye: No discharge.      Conjunctiva/sclera: Conjunctivae normal.   Cardiovascular:      Rate and Rhythm: Normal rate and regular rhythm.      Pulses: Normal pulses.      Heart sounds: Normal heart sounds. No murmur heard.  Pulmonary:      Effort: Pulmonary effort is normal. No respiratory distress.      Breath sounds: Normal breath sounds. No wheezing or rales.   Abdominal:      General: Abdomen is flat. There is no distension.      Palpations: Abdomen is soft.      Tenderness: There is no abdominal tenderness.   Musculoskeletal:         General: No tenderness.      Right lower leg: No edema.      Left lower leg: No edema.   Skin:     General: Skin is warm and dry.      Capillary Refill: Capillary refill takes less than 2 seconds.      Findings: No rash.   Neurological:      General: No focal deficit present.      Mental Status: She is alert and oriented to person, place, and time. Mental status is at baseline.   Psychiatric:         Mood and Affect: Mood normal.         Behavior: Behavior normal.         Thought Content: Thought content  normal.         Judgment: Judgment normal.     Relevant Results  Results for orders placed or performed during the hospital encounter of 12/31/23 (from the past 24 hour(s))   POCT GLUCOSE   Result Value Ref Range    POCT Glucose 222 (H) 74 - 99 mg/dL   POCT GLUCOSE   Result Value Ref Range    POCT Glucose 186 (H) 74 - 99 mg/dL   Troponin I, High Sensitivity   Result Value Ref Range    Troponin I, High Sensitivity 12 0 - 13 ng/L   CBC and Auto Differential   Result Value Ref Range    WBC 5.3 4.4 - 11.3 x10*3/uL    nRBC 0.0 0.0 - 0.0 /100 WBCs    RBC 3.81 (L) 4.00 - 5.20 x10*6/uL    Hemoglobin 12.7 12.0 - 16.0 g/dL    Hematocrit 38.6 36.0 - 46.0 %     (H) 80 - 100 fL    MCH 33.3 26.0 - 34.0 pg    MCHC 32.9 32.0 - 36.0 g/dL    RDW 12.7 11.5 - 14.5 %    Platelets 122 (L) 150 - 450 x10*3/uL    Neutrophils % 56.5 40.0 - 80.0 %    Immature Granulocytes %, Automated 0.4 0.0 - 0.9 %    Lymphocytes % 27.0 13.0 - 44.0 %    Monocytes % 14.0 2.0 - 10.0 %    Eosinophils % 1.7 0.0 - 6.0 %    Basophils % 0.4 0.0 - 2.0 %    Neutrophils Absolute 3.02 1.60 - 5.50 x10*3/uL    Immature Granulocytes Absolute, Automated 0.02 0.00 - 0.50 x10*3/uL    Lymphocytes Absolute 1.44 0.80 - 3.00 x10*3/uL    Monocytes Absolute 0.75 0.05 - 0.80 x10*3/uL    Eosinophils Absolute 0.09 0.00 - 0.40 x10*3/uL    Basophils Absolute 0.02 0.00 - 0.10 x10*3/uL   Basic metabolic panel   Result Value Ref Range    Glucose 125 (H) 74 - 99 mg/dL    Sodium 136 136 - 145 mmol/L    Potassium 4.3 3.5 - 5.3 mmol/L    Chloride 103 98 - 107 mmol/L    Bicarbonate 25 21 - 32 mmol/L    Anion Gap 12 10 - 20 mmol/L    Urea Nitrogen 25 (H) 6 - 23 mg/dL    Creatinine 1.01 0.50 - 1.05 mg/dL    eGFR 52 (L) >60 mL/min/1.73m*2    Calcium 8.2 (L) 8.6 - 10.3 mg/dL   Magnesium   Result Value Ref Range    Magnesium 1.69 1.60 - 2.40 mg/dL   POCT GLUCOSE   Result Value Ref Range    POCT Glucose 128 (H) 74 - 99 mg/dL   POCT GLUCOSE   Result Value Ref Range    POCT Glucose 226 (H) 74  - 99 mg/dL                   Principal Problem:    Influenza A  Active Problems:    Paroxysmal atrial fibrillation (CMS/[facility])    Syncope    Chronic kidney disease, stage 3b (CMS/[facility])    Type 2 diabetes mellitus, without long-term current use of insulin (CMS/[facility])    Andreia Camejo is a 89 y.o. female with a history of multiple medical problems including chronic kidney disease stage IV, chronic ischemic heart disease, pulmonary embolism, atrial fibrillation, and diabetes mellitus who presented to the emergency department on December 31 after passing out at home.         Syncope  -Hemodynamically stable satting well on NC  -CBC unremarkable  -BMP normal electrolytes serum creatinine 1.3  -Most likely on the basis of dehydration in the setting of decreased p.o. intake with acute illness  -Echo 1/1/2024 showed ejection fraction 60 to 65% with mild aortic stenosis  -Cardiology following, with recommendation to continue to monitor on daily, continue Plavix Coreg and statin     Influenza A  -Will initiate patient on renal dosed Tamiflu based on current creatinine clearance of 27 mL/min.  Start 30 mg daily.  -There is no evidence of complicating bacterial infection at this point.  However monitor for fevers or hemodynamic compromise.  No evidence of infiltrate on chest x-ray.     Chronic kidney disease stage IIIb  -Serum creatinine around baseline  -Creatinine appears widely varied based on prior labs.  She appears within range of her baseline with GFR 32.  -Continue to monitor renal function during hospitalization and avoid nephrotoxins.     Diabetes mellitus  -Not on any home medications for this issue  -Most recent A1c shows suboptimal control at 8.3%  -Add sliding scale insulin Humalog and monitor blood sugars  -Diabetic diet with hypoglycemia protocols     Atrial fibrillation  -Continue apixaban and carvedilol with hold parameters for the latter  -Change apixaban to 2.5 mg BID based on current creatinine  clearance  -Monitor for RVR on telemetry     Coronary artery disease  -Continue home carvedilol, rosuvastatin, clopidogrel  -Rule out acute ischemia as noted above  -Monitor on telemetry     Disposition  -Pending improvement of symptoms pending, pending discharge to SNF  Lisa Guerra MD

## 2024-01-03 NOTE — CARE PLAN
The patient's goals for the shift include      The clinical goals for the shift include pt will not have ay sob during shift    Over the shift, the patient did not make progress toward the following goals. Barriers to progression include . Recommendations to address these barriers include   Problem: Respiratory  Goal: Minimal/no exertional discomfort or dyspnea this shift  Outcome: Progressing  Goal: Patent airway maintained this shift  Outcome: Progressing   .

## 2024-01-04 VITALS
RESPIRATION RATE: 18 BRPM | OXYGEN SATURATION: 96 % | DIASTOLIC BLOOD PRESSURE: 72 MMHG | HEART RATE: 60 BPM | WEIGHT: 190 LBS | SYSTOLIC BLOOD PRESSURE: 137 MMHG | HEIGHT: 68 IN | TEMPERATURE: 97.7 F | BODY MASS INDEX: 28.79 KG/M2

## 2024-01-04 LAB
ANION GAP SERPL CALC-SCNC: 10 MMOL/L (ref 10–20)
BASOPHILS # BLD AUTO: 0.02 X10*3/UL (ref 0–0.1)
BASOPHILS NFR BLD AUTO: 0.4 %
BUN SERPL-MCNC: 26 MG/DL (ref 6–23)
CALCIUM SERPL-MCNC: 8.4 MG/DL (ref 8.6–10.3)
CHLORIDE SERPL-SCNC: 103 MMOL/L (ref 98–107)
CO2 SERPL-SCNC: 30 MMOL/L (ref 21–32)
CREAT SERPL-MCNC: 1.15 MG/DL (ref 0.5–1.05)
EOSINOPHIL # BLD AUTO: 0.1 X10*3/UL (ref 0–0.4)
EOSINOPHIL NFR BLD AUTO: 2.1 %
ERYTHROCYTE [DISTWIDTH] IN BLOOD BY AUTOMATED COUNT: 12.6 % (ref 11.5–14.5)
GFR SERPL CREATININE-BSD FRML MDRD: 44 ML/MIN/1.73M*2
GLUCOSE BLD MANUAL STRIP-MCNC: 177 MG/DL (ref 74–99)
GLUCOSE BLD MANUAL STRIP-MCNC: 197 MG/DL (ref 74–99)
GLUCOSE BLD MANUAL STRIP-MCNC: 235 MG/DL (ref 74–99)
GLUCOSE SERPL-MCNC: 140 MG/DL (ref 74–99)
HCT VFR BLD AUTO: 39.6 % (ref 36–46)
HGB BLD-MCNC: 12.7 G/DL (ref 12–16)
IMM GRANULOCYTES # BLD AUTO: 0.02 X10*3/UL (ref 0–0.5)
IMM GRANULOCYTES NFR BLD AUTO: 0.4 % (ref 0–0.9)
LYMPHOCYTES # BLD AUTO: 1.62 X10*3/UL (ref 0.8–3)
LYMPHOCYTES NFR BLD AUTO: 33.8 %
MAGNESIUM SERPL-MCNC: 1.82 MG/DL (ref 1.6–2.4)
MCH RBC QN AUTO: 33 PG (ref 26–34)
MCHC RBC AUTO-ENTMCNC: 32.1 G/DL (ref 32–36)
MCV RBC AUTO: 103 FL (ref 80–100)
MONOCYTES # BLD AUTO: 0.68 X10*3/UL (ref 0.05–0.8)
MONOCYTES NFR BLD AUTO: 14.2 %
NEUTROPHILS # BLD AUTO: 2.36 X10*3/UL (ref 1.6–5.5)
NEUTROPHILS NFR BLD AUTO: 49.1 %
NRBC BLD-RTO: 0 /100 WBCS (ref 0–0)
PLATELET # BLD AUTO: 131 X10*3/UL (ref 150–450)
POTASSIUM SERPL-SCNC: 4.7 MMOL/L (ref 3.5–5.3)
RBC # BLD AUTO: 3.85 X10*6/UL (ref 4–5.2)
SODIUM SERPL-SCNC: 138 MMOL/L (ref 136–145)
WBC # BLD AUTO: 4.8 X10*3/UL (ref 4.4–11.3)

## 2024-01-04 PROCEDURE — 80048 BASIC METABOLIC PNL TOTAL CA: CPT | Performed by: INTERNAL MEDICINE

## 2024-01-04 PROCEDURE — 2500000004 HC RX 250 GENERAL PHARMACY W/ HCPCS (ALT 636 FOR OP/ED): Performed by: INTERNAL MEDICINE

## 2024-01-04 PROCEDURE — 36415 COLL VENOUS BLD VENIPUNCTURE: CPT | Performed by: INTERNAL MEDICINE

## 2024-01-04 PROCEDURE — 82947 ASSAY GLUCOSE BLOOD QUANT: CPT

## 2024-01-04 PROCEDURE — 2500000002 HC RX 250 W HCPCS SELF ADMINISTERED DRUGS (ALT 637 FOR MEDICARE OP, ALT 636 FOR OP/ED): Performed by: INTERNAL MEDICINE

## 2024-01-04 PROCEDURE — 2500000001 HC RX 250 WO HCPCS SELF ADMINISTERED DRUGS (ALT 637 FOR MEDICARE OP): Performed by: NURSE PRACTITIONER

## 2024-01-04 PROCEDURE — 99239 HOSP IP/OBS DSCHRG MGMT >30: CPT | Performed by: INTERNAL MEDICINE

## 2024-01-04 PROCEDURE — 2500000001 HC RX 250 WO HCPCS SELF ADMINISTERED DRUGS (ALT 637 FOR MEDICARE OP): Performed by: INTERNAL MEDICINE

## 2024-01-04 PROCEDURE — 83735 ASSAY OF MAGNESIUM: CPT | Performed by: INTERNAL MEDICINE

## 2024-01-04 PROCEDURE — 97530 THERAPEUTIC ACTIVITIES: CPT | Mod: GO

## 2024-01-04 PROCEDURE — 85025 COMPLETE CBC W/AUTO DIFF WBC: CPT | Performed by: INTERNAL MEDICINE

## 2024-01-04 RX ORDER — POLYETHYLENE GLYCOL 3350 17 G/17G
17 POWDER, FOR SOLUTION ORAL 2 TIMES DAILY
Qty: 6 PACKET | Refills: 0 | Status: SHIPPED | OUTPATIENT
Start: 2024-01-04 | End: 2024-01-07

## 2024-01-04 RX ADMIN — ACETAMINOPHEN 975 MG: 325 TABLET ORAL at 08:51

## 2024-01-04 RX ADMIN — POLYETHYLENE GLYCOL 3350 17 G: 17 POWDER, FOR SOLUTION ORAL at 08:54

## 2024-01-04 RX ADMIN — GABAPENTIN 300 MG: 300 CAPSULE ORAL at 08:52

## 2024-01-04 RX ADMIN — INSULIN LISPRO 1 UNITS: 100 INJECTION, SOLUTION INTRAVENOUS; SUBCUTANEOUS at 08:48

## 2024-01-04 RX ADMIN — CARVEDILOL 3.12 MG: 3.12 TABLET, FILM COATED ORAL at 16:57

## 2024-01-04 RX ADMIN — CLOPIDOGREL 75 MG: 75 TABLET ORAL at 08:50

## 2024-01-04 RX ADMIN — Medication 2000 UNITS: at 08:50

## 2024-01-04 RX ADMIN — INSULIN LISPRO 2 UNITS: 100 INJECTION, SOLUTION INTRAVENOUS; SUBCUTANEOUS at 11:35

## 2024-01-04 RX ADMIN — OSELTAMIVIR PHOSPHATE 30 MG: 30 CAPSULE ORAL at 08:54

## 2024-01-04 RX ADMIN — CARVEDILOL 3.12 MG: 3.12 TABLET, FILM COATED ORAL at 08:52

## 2024-01-04 RX ADMIN — APIXABAN 5 MG: 5 TABLET, FILM COATED ORAL at 08:50

## 2024-01-04 ASSESSMENT — PAIN SCALES - GENERAL
PAINLEVEL_OUTOF10: 0 - NO PAIN
PAINLEVEL_OUTOF10: 0 - NO PAIN

## 2024-01-04 ASSESSMENT — COGNITIVE AND FUNCTIONAL STATUS - GENERAL
HELP NEEDED FOR BATHING: A LITTLE
MOVING TO AND FROM BED TO CHAIR: A LITTLE
DAILY ACTIVITIY SCORE: 20
PERSONAL GROOMING: A LITTLE
TURNING FROM BACK TO SIDE WHILE IN FLAT BAD: A LITTLE
DRESSING REGULAR LOWER BODY CLOTHING: A LITTLE
DRESSING REGULAR UPPER BODY CLOTHING: A LITTLE
CLIMB 3 TO 5 STEPS WITH RAILING: A LOT
DRESSING REGULAR LOWER BODY CLOTHING: A LITTLE
STANDING UP FROM CHAIR USING ARMS: A LITTLE
MOVING FROM LYING ON BACK TO SITTING ON SIDE OF FLAT BED WITH BEDRAILS: A LITTLE
DAILY ACTIVITIY SCORE: 19
WALKING IN HOSPITAL ROOM: A LITTLE
MOBILITY SCORE: 17
DRESSING REGULAR UPPER BODY CLOTHING: A LITTLE
TOILETING: A LITTLE
HELP NEEDED FOR BATHING: A LITTLE
TOILETING: A LITTLE

## 2024-01-04 NOTE — DISCHARGE SUMMARY
Discharge Diagnosis  Influenza A    Issues Requiring Follow-Up  Cardiology outpt    Discharge Meds     Your medication list        START taking these medications        Instructions Last Dose Given Next Dose Due   oxygen gas therapy  Commonly known as: O2      Inhale 1 each once every 24 hours.       polyethylene glycol 17 gram packet  Commonly known as: Glycolax, Miralax      Take 17 g by mouth 2 times a day for 3 days.              CONTINUE taking these medications        Instructions Last Dose Given Next Dose Due   acetaminophen 500 mg tablet  Commonly known as: Tylenol           apixaban 5 mg tablet  Commonly known as: Eliquis      Take 1 tablet (5 mg) by mouth every 12 hours.       carvedilol 3.125 mg tablet  Commonly known as: Coreg      Take 1 tablet (3.125 mg) by mouth 2 times a day with meals.       cholecalciferol 50 MCG (2000 UT) tablet  Commonly known as: Vitamin D-3           clopidogrel 75 mg tablet  Commonly known as: Plavix      Take 1 tablet (75 mg) by mouth once daily.       gabapentin 300 mg capsule  Commonly known as: Neurontin      TAKE 1 CAPSULE EVERY 6 HOURS       nitroglycerin 0.4 mg SL tablet  Commonly known as: Nitrostat      PLACE 1 TABLET (0.4 MG) UNDER THE TONGUE EVERY 5 MINUTES AS NEEDED FOR CHEST PAIN       rosuvastatin 10 mg tablet  Commonly known as: Crestor      Take 1 tablet (10 mg) by mouth once daily at bedtime.              ASK your doctor about these medications        Instructions Last Dose Given Next Dose Due   nystatin cream  Commonly known as: Mycostatin                     Where to Get Your Medications        These medications were sent to [facility]/pharmacy #[id number] - [place], [place] - [address] AT [facility]  [address] [facility] [place] [address]      Phone: [phone]   polyethylene glycol 17 gram packet       Information about where to get these medications is not yet available    Ask your nurse or doctor about these medications  oxygen gas  therapy         Test Results Pending At Discharge  Pending Labs       No current pending labs.            Hospital Course  Andreia Camejo is a 89 y.o. female with a history of multiple medical problems including chronic kidney disease stage IV, chronic ischemic heart disease, pulmonary embolism, atrial fibrillation, and diabetes mellitus who presented to the emergency department on December 31 after passing out at home.       In the ER lab work showed glucose 232, potassium 4.4, BUN/Cr 23/1.49, , troponin 35, INR 1.5, WBC 9.0, H&H 13.7/41.6, positive influenza A, CXR negative, CT head negative. EKG showed SR with 1st degree AVB. BP 97/77, pulse ox 90-96% on room air. ECHO LVEF normal, mild A S.   Cardiology cleared the patient for discharge PT OT recommended SNF, the patient will be discharged to SNF today.  The patient and her family are agreeable.     Pertinent Physical Exam At Time of Discharge  Physical Exam  Constitutional: Well developed, awake/alert/oriented x3, no distress, alert and cooperative  Eyes: PERRL, EOMI, clear sclera  ENMT: mucous membranes moist, no apparent injury, no lesions seen  Head/Neck: Neck supple, no apparent injury, thyroid without mass or tenderness, No JVD, trachea midline, no bruits  Respiratory/Thorax: Patent airways, fine rales RLL with good chest expansion, thorax symmetric  Cardiovascular: Regular, rate and rhythm, 1/6 systolic murmur, 2+ equal pulses of the extremities, normal S 1and S 2  Gastrointestinal: Nondistended, soft, non-tender, no rebound tenderness or guarding, no masses palpable, no organomegaly, +BS, no bruits  Musculoskeletal: ROM intact, no joint swelling, normal strength  Extremities: normal extremities, no cyanosis edema, contusions or wounds, no clubbing  Neurological: alert and oriented x3, intact senses, motor, response and reflexes, normal strength  Lymphatic: No significant lymphadenopathy  Psychological: Appropriate mood and behavior  Skin: Warm  and dry, no lesions, no rashes    Outpatient Follow-Up  Future Appointments   Date Time Provider Department Center   5/21/2024 11:30 AM [facility]         Lisa Guerra MD

## 2024-01-04 NOTE — PROGRESS NOTES
01/04/24 1350   Discharge Planning   Living Arrangements Alone   Support Systems Children   Assistance Needed Patient is A&O X3, on room air at baseline, is independent with most ADLs and uses a cane and walker for ambulation at home. Per daughter, she lives nextdoor to the patient and assists her as needed. Patient also has a private duty bath aide that comes twice per week as well as a house keeper   Type of Residence Private residence   Number of Stairs to Enter Residence 2   Number of Stairs Within Residence 0   Who is requesting discharge planning? Provider   Home or Post Acute Services Post acute facilities (Rehab/SNF/etc)   Type of Post Acute Facility Services Skilled nursing   Patient expects to be discharged to: [facility]F- [facility]   Does the patient need discharge transport arranged? Yes   RoundTrip coordination needed? Yes   Has discharge transport been arranged? No     1/4/24 at 1552: Patient medically ready for discharge. Patient to discharge to [facility], transport arranged for 1700. Patient, patients daughter (Zoe), and bedside RN made aware of discharge and transport time. Nurse to nurse report number provided to bedside RN.

## 2024-01-04 NOTE — CARE PLAN
Problem: Respiratory  Goal: Minimal/no exertional discomfort or dyspnea this shift  Flowsheets (Taken 1/3/2024 1342)  Minimal/no exertional discomfort or dyspnea this shift: Positioning to promote ventilation/comfort   The patient's goals for the shift include      The clinical goals for the shift include Pt will remain safe overnight    Over the shift, the patient did make progress toward the following goals. Pt had uneventful night. Had BM this shift. No c/o pain. VSS

## 2024-01-04 NOTE — PROGRESS NOTES
Occupational Therapy    Occupational Therapy Treatment    Name: Andreia Camejo  MRN: [id number]  : 1929  Date: 24  Time Calculation  Start Time: 1005  Stop Time: 1015  Time Calculation (min): 10 min    Assessment:  End of Session Communication: Bedside nurse  End of Session Patient Position: Up in chair, Alarm on  Plan:  Treatment Interventions: Functional transfer training, Compensatory technique education  OT Frequency: 3 times per week  OT Discharge Recommendations: Moderate intensity level of continued care  OT - OK to Discharge: Yes (per OT POC)    Subjective   Previous Visit Info:  OT Last Visit  OT Received On: 24  General:  General  Reason for Referral: 89 YOF admitted with flu symptoms  Prior to Session Communication: Bedside nurse  Patient Position Received: Bed, 3 rail up, Alarm on  General Comment: Pt pleasant and agreeable to treatment. Reporting continued generalized fatigue and weakness, activity limited at pt request. 1L O2, purewick.  Precautions:  Medical Precautions: Fall precautions  Precautions Comment: Droplet precautions, influenza (+)    Vitals:   VSS    Pain Assessment:  Pain Assessment  Pain Score: 0 - No pain     Objective     Bed Mobility/Transfers: Bed Mobility 1  Bed Mobility 1: Supine to sitting  Level of Assistance 1: Close supervision  Bed Mobility Comments 1: HOB elevated    Transfer 1  Technique 1: Sit to stand, Stand to sit  Transfer Device 1: Walker  Transfer Level of Assistance 1: Contact guard  Trials/Comments 1: VC for hnad placement on FWW, receptive to edu for safety and demos safe hand placement during transitional movements      Ambulation/Gait Training:  Ambulation/Gait Training  Ambulation/Gait Training Performed:  (x3 steps forward/backward, x3 additional steps with direction change to amb from bed>bedside chair. CGA with FWW)    Outcome Measures:  [facility] Daily Activity  Putting on and taking off regular lower body clothing: A little  Bathing  (including washing, rinsing, drying): A little  Putting on and taking off regular upper body clothing: A little  Toileting, which includes using toilet, bedpan or urinal: A little  Taking care of personal grooming such as brushing teeth: A little  Eating Meals: None  Daily Activity - Total Score: 19        Education Documentation  ADL Training, taught by Mckenzie Clifford OT at 1/4/2024 10:50 AM.  Learner: Patient  Readiness: Acceptance  Method: Explanation  Response: Demonstrated Understanding    Goals:  Encounter Problems            OT Goals       Pt will demo functional transfers to/ from EOB, chair and commode mod I and LRD (Progressing)       Start:  01/02/24    Expected End:  01/16/24            Pt will demo functional transfers to/ from EOB, chair and commode mod I and LRD (Progressing)       Start:  01/02/24    Expected End:  01/16/24            Pt will demo ADL routine and meaningful daily activities indep using modifications as needed  (Progressing)       Start:  01/02/24    Expected End:  01/16/24            Pt will demo improved activity tolerance evidenced by participation in BADL and/or functional mobility x10 mins with </=1 rest break.  (Progressing)       Start:  01/02/24    Expected End:  01/16/24            Pt will demo improved activity tolerance evidenced by participation in BADL and/or functional mobility x10 mins with </=1 rest break.   (Progressing)       Start:  01/02/24    Expected End:  01/16/24

## 2024-01-04 NOTE — CARE PLAN
The patient's goals for the shift include  start feeling stronger    The clinical goals for the shift include pt will work with therapy during shift    Pt had uneventful day. Medicated appropriately.  Updated to plan of care

## 2024-01-04 NOTE — PROGRESS NOTES
"Physical Therapy                 Therapy Communication Note    Patient Name: Andreia Camejo  MRN: [id number]  Today's Date: 1/4/2024     Discipline: Physical Therapy    Missed Visit Reason: Missed Visit Reason: Parent refused (pt preparing for DC per self-report. Declined participation at this time d/t pending DC. Also reports \"I just got back to bed. They'll work with me over there.\" No PT tx at 1347)    Missed Time: Attempt      "

## 2024-01-06 NOTE — DOCUMENTATION CLARIFICATION NOTE
PATIENT:               SHARLENE CARREON  ACCT #:                  [id number]  MRN:                       [id number]  :                       1929  ADMIT DATE:       2023 9:18 AM  DISCH DATE:        2024 5:10 PM  RESPONDING PROVIDER #:        [id number]          PROVIDER RESPONSE TEXT:    Acute Kidney Injury ruled in as evidenced by Creatinine 1.4    CDI QUERY TEXT:    [facility]_CV CORINNE        Instruction:    Based on your assessment of the patient and the clinical information, please provide the requested documentation by clicking on the appropriate radio button and enter any additional information if prompted.    Question: Please clarify the diagnosis of Acute Kidney Injury    When answering this query, please exercise your independent professional judgment. The fact that a question is being asked, does not imply that any particular answer is desired or expected.    The patient's clinical indicators include:  Clinical Information: 89 year old female presents after passing out at home .  Work-up reveals hypotension, Influenza A and CORINNE.  Admitted for further evaluation and treatment.    Documented Diagnosis:    DPN 24 - Chronic kidney disease, stage 3b    Cardiac note 24 -  8. Acute on chronic kidney disease  -from dehydration  -Creatinine 1.4->1.09  -Improved with IVF  -Baseline appears to be around 1.2    Clinical Indicators and Documentation:  -Vital Signs:  23 - 36.6, 97/77, 82, 18, 92 percent on room air  -Baseline Creatinine:  approx. 1.2 as noted in Cardiac note 1/3/24  -SCr lab values:   - 1.49,   - 1.34,  24 - 1.06,  1/3- 1.01  -Urine Output: not documented  -Electrolyte lab values: WNL this admission  -Nephrology consult: n/a    Treatment: IV Fluid bolus x2, continued monitoring of Creatinine    Risk Factors: CKD 3b  Options provided:  -- Acute Kidney Injury is ruled out  -- Acute Kidney Injury ruled in as evidenced by, Please specify additional information below  -- Other -  I will add my own diagnosis  -- Refer to Clinical Documentation Reviewer    Query created by: Jocelyne Amaya on 1/3/2024 1:35 PM      Electronically signed by:  CLOTILDE EARL MD 1/5/2024 8:18 PM

## 2024-01-10 ENCOUNTER — PATIENT OUTREACH (OUTPATIENT)
Dept: CARE COORDINATION | Facility: CLINIC | Age: 89
End: 2024-01-10
Payer: MEDICARE

## 2024-01-10 LAB
ATRIAL RATE: 79 BPM
P AXIS: 39 DEGREES
P OFFSET: 115 MS
P ONSET: 85 MS
PR INTERVAL: 262 MS
Q ONSET: 216 MS
QRS COUNT: 13 BEATS
QRS DURATION: 96 MS
QT INTERVAL: 396 MS
QTC CALCULATION(BAZETT): 454 MS
QTC FREDERICIA: 434 MS
R AXIS: -31 DEGREES
T AXIS: 74 DEGREES
T OFFSET: 414 MS
VENTRICULAR RATE: 79 BPM

## 2024-01-10 SDOH — ECONOMIC STABILITY: TRANSPORTATION INSECURITY
IN THE PAST 12 MONTHS, HAS THE LACK OF TRANSPORTATION KEPT YOU FROM MEDICAL APPOINTMENTS OR FROM GETTING MEDICATIONS?: NO

## 2024-01-10 SDOH — ECONOMIC STABILITY: GENERAL: WOULD YOU LIKE HELP WITH ANY OF THE FOLLOWING NEEDS?: I DONT NEED HELP WITH ANY OF THESE

## 2024-01-10 NOTE — PROGRESS NOTES
Discharged from [facility] where treated for syncope and influenza A to [facility]   Discharged from [facility] 1/9    Appt with PCP 1/18   PMH: on Eliquis for atrial fib pacemaker, CAD, HTN, HLD DVT/PE status post IVC filter,  diabetes   Lives alone, has HHA twice per week; dtr lives next door    Engagement  Call Start Time: 1323 (1/10/2024  1:23 PM)    Medications  Medications reviewed with patient/caregiver?: Yes (1/10/2024  1:23 PM)  Is the patient having any side effects they believe may be caused by any medication additions or changes?: No (1/10/2024  1:23 PM)  Does the patient have all medications ordered at discharge?: Yes (1/10/2024  1:23 PM)  Care Management Interventions: No intervention needed (1/10/2024  1:23 PM)  Is the patient taking all medications as directed (includes completed medication regime)?: Yes (1/10/2024  1:23 PM)    Appointments  Does the patient have a primary care provider?: Yes (1/10/2024  1:23 PM)  Care Management Interventions: Verified appointment date/time/provider (1/10/2024  1:23 PM)  Has the patient kept scheduled appointments due by today?: Yes (1/10/2024  1:23 PM)    Patient Teaching  Does the patient have access to their discharge instructions?: Yes (1/10/2024  1:23 PM)  Care Management Interventions: Reviewed instructions with patient (1/10/2024  1:23 PM)  What is the patient's perception of their health status since discharge?: Improving (1/10/2024  1:23 PM)  Is the patient/caregiver able to teach back the hierarchy of who to call/visit for symptoms/problems? PCP, Specialist, Home Health nurse, Urgent Care, ED, 911: Yes (1/10/2024  1:23 PM)    Wrap Up  Call End Time: 1325 (1/10/2024  1:23 PM)      Successful transition of care outreach.  Enrolled in conversa chat to monitor for 30 day transition period and will outreach if issues arise.    Anne BLOOD RN CCM  RN Care Coordinator  [facility]  Phone [phone]

## 2024-01-18 ENCOUNTER — OFFICE VISIT (OUTPATIENT)
Dept: PRIMARY CARE | Facility: CLINIC | Age: 89
End: 2024-01-18
Payer: MEDICARE

## 2024-01-18 VITALS
BODY MASS INDEX: 27.98 KG/M2 | DIASTOLIC BLOOD PRESSURE: 60 MMHG | SYSTOLIC BLOOD PRESSURE: 118 MMHG | WEIGHT: 184 LBS | OXYGEN SATURATION: 99 % | HEART RATE: 80 BPM

## 2024-01-18 DIAGNOSIS — I10 BENIGN ESSENTIAL HYPERTENSION: ICD-10-CM

## 2024-01-18 DIAGNOSIS — M48.062 SPINAL STENOSIS OF LUMBAR REGION WITH NEUROGENIC CLAUDICATION: ICD-10-CM

## 2024-01-18 DIAGNOSIS — J10.1 INFLUENZA A: ICD-10-CM

## 2024-01-18 DIAGNOSIS — I48.0 PAROXYSMAL ATRIAL FIBRILLATION (MULTI): ICD-10-CM

## 2024-01-18 DIAGNOSIS — E11.22 TYPE 2 DIABETES MELLITUS WITH STAGE 3B CHRONIC KIDNEY DISEASE, WITHOUT LONG-TERM CURRENT USE OF INSULIN (MULTI): ICD-10-CM

## 2024-01-18 DIAGNOSIS — N18.32 CHRONIC KIDNEY DISEASE, STAGE 3B (MULTI): ICD-10-CM

## 2024-01-18 DIAGNOSIS — I25.10 CORONARY ARTERY DISEASE INVOLVING NATIVE CORONARY ARTERY OF NATIVE HEART WITHOUT ANGINA PECTORIS: ICD-10-CM

## 2024-01-18 DIAGNOSIS — N18.32 TYPE 2 DIABETES MELLITUS WITH STAGE 3B CHRONIC KIDNEY DISEASE, WITHOUT LONG-TERM CURRENT USE OF INSULIN (MULTI): ICD-10-CM

## 2024-01-18 DIAGNOSIS — M47.816 OSTEOARTHRITIS OF LUMBAR SPINE, UNSPECIFIED SPINAL OSTEOARTHRITIS COMPLICATION STATUS: ICD-10-CM

## 2024-01-18 PROBLEM — M35.3 POLYMYALGIA RHEUMATICA (MULTI): Status: ACTIVE | Noted: 2024-01-18

## 2024-01-18 PROBLEM — D69.6 THROMBOCYTOPENIA (CMS-HCC): Status: ACTIVE | Noted: 2024-01-18

## 2024-01-18 PROBLEM — H35.3231 EXUDATIVE AGE-RELATED MACULAR DEGENERATION, BILATERAL, WITH ACTIVE CHOROIDAL NEOVASCULARIZATION (MULTI): Status: ACTIVE | Noted: 2024-01-18

## 2024-01-18 PROCEDURE — 1111F DSCHRG MED/CURRENT MED MERGE: CPT | Performed by: FAMILY MEDICINE

## 2024-01-18 PROCEDURE — 3074F SYST BP LT 130 MM HG: CPT | Performed by: FAMILY MEDICINE

## 2024-01-18 PROCEDURE — 1036F TOBACCO NON-USER: CPT | Performed by: FAMILY MEDICINE

## 2024-01-18 PROCEDURE — 1160F RVW MEDS BY RX/DR IN RCRD: CPT | Performed by: FAMILY MEDICINE

## 2024-01-18 PROCEDURE — 99214 OFFICE O/P EST MOD 30 MIN: CPT | Performed by: FAMILY MEDICINE

## 2024-01-18 PROCEDURE — 3078F DIAST BP <80 MM HG: CPT | Performed by: FAMILY MEDICINE

## 2024-01-18 PROCEDURE — 1126F AMNT PAIN NOTED NONE PRSNT: CPT | Performed by: FAMILY MEDICINE

## 2024-01-18 PROCEDURE — 1159F MED LIST DOCD IN RCRD: CPT | Performed by: FAMILY MEDICINE

## 2024-01-18 NOTE — PROGRESS NOTES
Subjective   Patient ID: Andreia Camejo is a 89 y.o. female who presents for Follow-up (Hospital and rehab follow up.).    HPI     Here with DTR     Started with a cough Shreya  -   To the hospital  12/31/23 -   Dx Influenza A   Discharge 1/4    Was at [facility] for Rehab -   Home 1/9/24     Review of labs -   BNP mildly elevated -  Echo done - NML EF  Diastolic dysfunction     Feeling much better   - without complaints today       Severe DDD of neck and low back -   In the past told PMR - but has not gone back to Rheum   On Gabapentin  300 mg  QID     Cardiac issues -    CAD/ HX of PCI   Pacemaker  Parox afib   Sees DR Waters   Carvedilol 3.125 mg BID   Clopidogrel 75 mg a day   Eliquis 5mg BID   Rosuvastatin 10 mg a day     DMT 2 -   Not on any meds -   Blood sugars in the hospital -  128 - 235     Gets recurrent UTIs     CRF - Creatinine -  runs 1.01 - 1.49     MD - sees optho - gets inj regularly       Would like to wait on blood work     [place] did medicare wellness  5/8/23        Lives alone -   DTR lives next door   Has aids to help       Vaccines  -    Cannot get flu shots  - allergic   Covid -  had the primary series       Review of Systems    Objective   /60 (BP Location: Left arm, Patient Position: Sitting, BP Cuff Size: Large adult)   Pulse 80   Wt 83.5 kg (184 lb)   SpO2 99%   BMI 27.98 kg/m²     Physical Exam  Vitals reviewed.   Constitutional:       General: She is not in acute distress.     Appearance: She is obese.      Comments: Using walker    HENT:      Head: Normocephalic and atraumatic.      Nose: Nose normal.      Mouth/Throat:      Mouth: Mucous membranes are moist.      Pharynx: No posterior oropharyngeal erythema.   Eyes:      Extraocular Movements: Extraocular movements intact.      Conjunctiva/sclera: Conjunctivae normal.      Pupils: Pupils are equal, round, and reactive to light.   Cardiovascular:      Rate and Rhythm: Normal rate. Rhythm irregular.      Heart  sounds: Murmur heard.   Pulmonary:      Effort: Pulmonary effort is normal. No respiratory distress.      Breath sounds: Normal breath sounds. No wheezing.   Musculoskeletal:      Cervical back: No rigidity.   Lymphadenopathy:      Cervical: No cervical adenopathy.   Skin:     General: Skin is warm and dry.      Findings: No rash.   Neurological:      Mental Status: She is alert. Mental status is at baseline.   Psychiatric:         Mood and Affect: Mood normal.         Thought Content: Thought content normal.         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    Paroxysmal atrial fibrillation (CMS/[facility]) I48.0     On eliquis, sees cardiology          Spinal stenosis, lumbar M48.061    Chronic kidney disease, stage 3b (CMS/[facility]) N18.32     Has been stable          Type 2 diabetes mellitus with stage 3b chronic kidney disease, without long-term current use of insulin (CMS/[facility]) E11.22, N18.32     Not on medication - worse with steroids           Other Visit Diagnoses         Codes    Influenza A     J10.1          S/p hospitalization for the flu - cannot take flu shots   -     Doing much better     I reviewed hx - taking over her care from my partner who is retiring     I had a  discussion  with patient  about their elevated BMI   and/ or  provided information  on how to improve it with better nutrition and exercise.       We discussed at visit any disease processes that were of concern as well as the risks, benefits and instructions of any new medication provided.    See orders and discussion section for information handed to patient on their Clinical Summary.   Patient (and/or caretaker of patient if present)  stated all questions were answered, and they voiced understanding of instructions.

## 2024-01-18 NOTE — PATIENT INSTRUCTIONS
"NO change in meds today       Appt in 3 mos - regular medication check up -  blood work at that appt   Does not need to fast .        For General Healthy Nutrition    (Remember - NOT A DIET!   Diets are only good for class reunions.)    These are my general good nutrition recommendations for most people.   I use the term \" diet \"  in these instructions to mean your overall nutrition - how you eat and drink.   If we talked about something different during your visit with me,  other than what is written below,  follow that advice instead.       For most people,  eating healthier means getting less added sugar and less processed foods in your diet    The fresher the better.    Added sugar is now a part of the nutrition label on manufactured food, so you can keep an eye on it easier.    But basically,  foods and beverages  that contain regular sugar and corn syrup are the main sources of added sugars.  Eating as little of these foods as you can is best.   One shocking example of the epidemic of added sugar is soda.    One can of regular soda contains about as much added sugar as 3 regular size doughnuts!     The other issue with processed foods is the amount of processed grains they contain , such as white flour.    This is also something you want to try to limit in your diet.     But, grain products are very important for your nutrition.    Whole grains are better for your body.     Cutting back on white breads, traditional pasta, baked goods, white rice,  and processed cereals will be healthier for you.   The better choices include whole grain breads,  whole wheat pasta,  brown rice, quinoa, barley, steel cut  or rolled oats.   If you eat cereal for breakfast, try to look for one made with whole grains and less sugar.   There are many people who have a problem with gluten, for a large variety of reasons.    Generally,  products made with wheat flour , barley or rye are the primary source of gluten.       Cutting back " "on saturated fats is important.    You want the majority of the meat that you eat to be chicken, fish or turkey.   Baked or broiled is best -  fried adds too much fat.    There are healthy fats that are important - fat is important for holding down appetite, vitamin absorption and several metabolic processes in the body.  Monounsaturated fats raise HDL (good cholesterol) and lower LDL (bad cholesterol).   Olive oil, peanut oil, nuts, seeds, and avocados are great sources of the good fats.       Ideas are:   Trade sour cream dip for hummus (which is rich in olive oil) or guacamole; use veggies or whole-wheat chips to dip.    Nuts are an excellent source of protein and healthy fats.   Tree nuts are the best kind, such as almonds or walnuts.   Just be careful - they are high in calories, so stick to a serving size.  (Most are about 200 calories for a 1/4 cup)      Proteins are very important for your body, and they also hold down your appetite.   Try to have protein with every meal.    These generally are meats, nuts, many beans, legumes, eggs, and dairy.   You will find protein in whole grain products and some green vegetables have a little too.     When you have dairy (if you can - many people are lactose intolerant) try to make it low fat.    Ideas are 1% milk, lowfat yogurt or cheeses, low fat cottage cheese.   I don't generally recommend FAT FREE because they often contain artificial products to improve taste, and the fat helps hold down your appetite.   If you are lactose intolerant, try to see if taking Lactaid before having dairy helps.      Fresh fruits and vegetables are VERY important.  The brighter the better.   Many vegetables are considered \"Free Foods\" - meaning you can eat as much as you want, and it does not matter.  These include tomatoes, cucumbers, celery, peppers, all the various lettuces and kale - to name a few.   Potatoes, corn and peas are starchy, so do have more calories, but are still " healthy - you just want to watch the amount of them you eat.       Fruits are full of wonderful nutrition.   They contain natural sugar called fructose, so eating them in moderation is best.   Diabetics may need to pay careful attention to how their body reacts to the sugar.  Some fruits might drastically increase their blood sugar.      Eating smaller meals with a couple of small snacks is better for your metabolism than not eating for long amounts of time  (breakfast is very important).   Trying to avoid large meals is helpful too.    Eating like this helps keep your appetite down and keeps you in burning metabolism rather than storage metabolism so your body will use the calories you eat.       I do not tell people to stop eating sweets or snack foods - just limit the amounts you have.  The less the better.   Pay attention to serving sizes, and treat them as a treat.        Foods like doughnuts, pop tarts, sugar cereals, cookies  ARE NOT GOOD FOR BREAKFAST.   They are loaded with sugar and will cause you to be hungrier in the day and often not feel well.    Caffeine needs to be limited - no more than 2 servings a day.  Some people can't have any at all.    (if you have any sleep or anxiety issues - stop the caffeine)   Coffee, many teas, many sodas, energy drinks, almost any diet supplement,  and chocolate all contain caffeine.      Water is important.   For most people, 8   x  8 ounces  a day are needed.  This may vary for some health issues.    If you need to be on a low sodium diet, that means looking at labels and eating only 1000 - 2000 mg of sodium a day.    Calcium intake is important.  3 servings of a high calcium food or drink a day is recommended.   This is usually a cup of milk, a cup of yogurt, an ounce of a hard cheese or 1.5 ounces of a soft cheese are the usual servings.   There are other high calcium foods - including soy or almond milk, broccoli,  almonds, dark green leafy vegetable.   Make sure  you are not getting more than 1000  - 1200 mg of total calcium a day (unless you have been told you need more by a doctor).    Vitamin D 3 is important to absorb the calcium and for your immune system.   For children, 400 IU a day is recommended.   For adults - 800 - 5000 IU a day  is recommended.  (Often the amount needed is individualized for adults - be sure to ask how much is right for you)    Physical activity is very important for good health.    Finding activities that give you regular exercise is very important for good health.  Try to find exercise you enjoy doing on a regular basis.    30 minutes at least 5 days a week of a good cardiovascular exercise is recommended.   That means something that gets your heart rate going faster than your usual baseline and you can find yourself breathing harder than usual while you are exercising.  If you have not done any exercise in a long time,  make sure you ask if its safe for you to start,  and be sure to gradually work up to your goal.      If you need to lose weight,  following these recommendations will help you.   And if you are doing all of this and still not losing weight, then its likely just the amount of food you are eating.   Learn to cut back on portion sizes.  Using smaller plates may help.  Healthy weight loss is  only about a pound a week.   You have to remember that whatever you do to lose the weight, you must be prepared to keep it up for life for the weight to stay off.     A lot of people have a lot of luck with using something like a fit bit,  or a program where you keep track of all of your calories that you eat and what you burn off in the day.

## 2024-01-19 ENCOUNTER — PATIENT OUTREACH (OUTPATIENT)
Dept: CARE COORDINATION | Facility: CLINIC | Age: 89
End: 2024-01-19
Payer: MEDICARE

## 2024-01-19 PROBLEM — R29.90 STROKE-LIKE SYMPTOMS: Status: RESOLVED | Noted: 2024-01-19 | Resolved: 2024-01-19

## 2024-01-19 PROBLEM — Z86.711 HISTORY OF PULMONARY EMBOLISM: Status: ACTIVE | Noted: 2024-01-19

## 2024-01-19 PROBLEM — I44.0 FIRST DEGREE AV BLOCK: Status: ACTIVE | Noted: 2024-01-19

## 2024-01-19 PROBLEM — H61.22 IMPACTED CERUMEN OF LEFT EAR: Status: RESOLVED | Noted: 2023-09-07 | Resolved: 2024-01-19

## 2024-01-19 PROBLEM — N39.0 URINARY TRACT INFECTION WITHOUT HEMATURIA: Status: RESOLVED | Noted: 2023-05-08 | Resolved: 2024-01-19

## 2024-01-19 PROBLEM — E66.3 OVERWEIGHT WITH BODY MASS INDEX (BMI) 25.0-29.9: Status: ACTIVE | Noted: 2024-01-19

## 2024-01-19 PROBLEM — R53.1 WEAKNESS: Status: RESOLVED | Noted: 2024-01-19 | Resolved: 2024-01-19

## 2024-01-19 PROBLEM — Z95.828 PRESENCE OF IVC FILTER: Status: ACTIVE | Noted: 2024-01-19

## 2024-01-19 PROBLEM — R15.9 FECAL INCONTINENCE: Status: ACTIVE | Noted: 2024-01-19

## 2024-01-19 PROBLEM — I51.89 DIASTOLIC DYSFUNCTION: Status: ACTIVE | Noted: 2024-01-19

## 2024-01-19 PROBLEM — R29.90 NEUROLOGICAL SYMPTOMS: Status: RESOLVED | Noted: 2024-01-19 | Resolved: 2024-01-19

## 2024-01-19 NOTE — PROGRESS NOTES
Post hospital patient outreach.  Reviewed 1/18 post SNF PCP note.   Patient states she gets tired and legs are a little weak, but doing ok.  Patient states her PCP heard some crackles. This writer encouraged patient to drink liberal fluids, to cough and deep breath every 1-2 hrs while awake, and to move around her house frequently. Will continue to monitor for completion of 30 day transition period and outreach if issues arise.    Anne BLOOD RN CCM  RN Care Coordinator  [facility]  Phone [phone]

## 2024-04-15 DIAGNOSIS — I25.10 CORONARY ARTERY DISEASE INVOLVING NATIVE CORONARY ARTERY OF NATIVE HEART WITHOUT ANGINA PECTORIS: ICD-10-CM

## 2024-04-15 DIAGNOSIS — I48.0 PAROXYSMAL ATRIAL FIBRILLATION (MULTI): ICD-10-CM

## 2024-04-15 RX ORDER — ROSUVASTATIN CALCIUM 10 MG/1
10 TABLET, COATED ORAL NIGHTLY
Qty: 90 TABLET | Refills: 3 | Status: SHIPPED | OUTPATIENT
Start: 2024-04-15

## 2024-04-15 RX ORDER — APIXABAN 5 MG/1
5 TABLET, FILM COATED ORAL EVERY 12 HOURS
Qty: 180 TABLET | Refills: 3 | Status: SHIPPED | OUTPATIENT
Start: 2024-04-15

## 2024-04-18 ENCOUNTER — HOSPITAL ENCOUNTER (OUTPATIENT)
Dept: RADIOLOGY | Facility: CLINIC | Age: 89
Discharge: HOME | End: 2024-04-18
Payer: MEDICARE

## 2024-04-18 ENCOUNTER — OFFICE VISIT (OUTPATIENT)
Dept: PRIMARY CARE | Facility: CLINIC | Age: 89
End: 2024-04-18
Payer: MEDICARE

## 2024-04-18 VITALS
OXYGEN SATURATION: 97 % | SYSTOLIC BLOOD PRESSURE: 136 MMHG | WEIGHT: 184 LBS | BODY MASS INDEX: 27.98 KG/M2 | HEART RATE: 68 BPM | DIASTOLIC BLOOD PRESSURE: 68 MMHG

## 2024-04-18 DIAGNOSIS — M25.511 ACUTE PAIN OF RIGHT SHOULDER: ICD-10-CM

## 2024-04-18 DIAGNOSIS — N18.32 CHRONIC KIDNEY DISEASE, STAGE 3B (MULTI): ICD-10-CM

## 2024-04-18 DIAGNOSIS — M35.3 POLYMYALGIA RHEUMATICA (MULTI): ICD-10-CM

## 2024-04-18 DIAGNOSIS — H91.90 HEARING LOSS, UNSPECIFIED HEARING LOSS TYPE, UNSPECIFIED LATERALITY: ICD-10-CM

## 2024-04-18 DIAGNOSIS — E11.9 TYPE 2 DIABETES MELLITUS WITHOUT COMPLICATION, WITHOUT LONG-TERM CURRENT USE OF INSULIN (MULTI): ICD-10-CM

## 2024-04-18 DIAGNOSIS — J30.9 ALLERGIC RHINITIS, UNSPECIFIED SEASONALITY, UNSPECIFIED TRIGGER: ICD-10-CM

## 2024-04-18 DIAGNOSIS — I10 BENIGN ESSENTIAL HYPERTENSION: Primary | ICD-10-CM

## 2024-04-18 DIAGNOSIS — D69.6 THROMBOCYTOPENIA (CMS-HCC): ICD-10-CM

## 2024-04-18 DIAGNOSIS — H35.3231 EXUDATIVE AGE-RELATED MACULAR DEGENERATION, BILATERAL, WITH ACTIVE CHOROIDAL NEOVASCULARIZATION (MULTI): ICD-10-CM

## 2024-04-18 LAB
ALBUMIN SERPL BCP-MCNC: 3.8 G/DL (ref 3.4–5)
ALP SERPL-CCNC: 55 U/L (ref 33–136)
ALT SERPL W P-5'-P-CCNC: 14 U/L (ref 7–45)
ANION GAP SERPL CALC-SCNC: 17 MMOL/L (ref 10–20)
AST SERPL W P-5'-P-CCNC: 18 U/L (ref 9–39)
BASOPHILS # BLD AUTO: 0.05 X10*3/UL (ref 0–0.1)
BASOPHILS NFR BLD AUTO: 0.7 %
BILIRUB SERPL-MCNC: 0.5 MG/DL (ref 0–1.2)
BUN SERPL-MCNC: 30 MG/DL (ref 6–23)
CALCIUM SERPL-MCNC: 8.9 MG/DL (ref 8.6–10.3)
CHLORIDE SERPL-SCNC: 106 MMOL/L (ref 98–107)
CO2 SERPL-SCNC: 25 MMOL/L (ref 21–32)
CREAT SERPL-MCNC: 1.35 MG/DL (ref 0.5–1.05)
EGFRCR SERPLBLD CKD-EPI 2021: 36 ML/MIN/1.73M*2
EOSINOPHIL # BLD AUTO: 0.16 X10*3/UL (ref 0–0.4)
EOSINOPHIL NFR BLD AUTO: 2.3 %
ERYTHROCYTE [DISTWIDTH] IN BLOOD BY AUTOMATED COUNT: 13.2 % (ref 11.5–14.5)
GLUCOSE SERPL-MCNC: 174 MG/DL (ref 74–99)
HCT VFR BLD AUTO: 43.2 % (ref 36–46)
HGB BLD-MCNC: 13.8 G/DL (ref 12–16)
IMM GRANULOCYTES # BLD AUTO: 0.02 X10*3/UL (ref 0–0.5)
IMM GRANULOCYTES NFR BLD AUTO: 0.3 % (ref 0–0.9)
LYMPHOCYTES # BLD AUTO: 1.49 X10*3/UL (ref 0.8–3)
LYMPHOCYTES NFR BLD AUTO: 21.3 %
MCH RBC QN AUTO: 33.3 PG (ref 26–34)
MCHC RBC AUTO-ENTMCNC: 31.9 G/DL (ref 32–36)
MCV RBC AUTO: 104 FL (ref 80–100)
MONOCYTES # BLD AUTO: 0.9 X10*3/UL (ref 0.05–0.8)
MONOCYTES NFR BLD AUTO: 12.9 %
NEUTROPHILS # BLD AUTO: 4.38 X10*3/UL (ref 1.6–5.5)
NEUTROPHILS NFR BLD AUTO: 62.5 %
NRBC BLD-RTO: 0 /100 WBCS (ref 0–0)
PLATELET # BLD AUTO: 136 X10*3/UL (ref 150–450)
POTASSIUM SERPL-SCNC: 5.2 MMOL/L (ref 3.5–5.3)
PROT SERPL-MCNC: 6.5 G/DL (ref 6.4–8.2)
RBC # BLD AUTO: 4.15 X10*6/UL (ref 4–5.2)
SODIUM SERPL-SCNC: 143 MMOL/L (ref 136–145)
TSH SERPL-ACNC: 0.97 MIU/L (ref 0.44–3.98)
WBC # BLD AUTO: 7 X10*3/UL (ref 4.4–11.3)

## 2024-04-18 PROCEDURE — 3078F DIAST BP <80 MM HG: CPT | Performed by: FAMILY MEDICINE

## 2024-04-18 PROCEDURE — 3075F SYST BP GE 130 - 139MM HG: CPT | Performed by: FAMILY MEDICINE

## 2024-04-18 PROCEDURE — 1159F MED LIST DOCD IN RCRD: CPT | Performed by: FAMILY MEDICINE

## 2024-04-18 PROCEDURE — 1157F ADVNC CARE PLAN IN RCRD: CPT | Performed by: FAMILY MEDICINE

## 2024-04-18 PROCEDURE — 99214 OFFICE O/P EST MOD 30 MIN: CPT | Performed by: FAMILY MEDICINE

## 2024-04-18 PROCEDURE — 1160F RVW MEDS BY RX/DR IN RCRD: CPT | Performed by: FAMILY MEDICINE

## 2024-04-18 PROCEDURE — 84443 ASSAY THYROID STIM HORMONE: CPT

## 2024-04-18 PROCEDURE — 73030 X-RAY EXAM OF SHOULDER: CPT | Mod: RIGHT SIDE | Performed by: RADIOLOGY

## 2024-04-18 PROCEDURE — 1036F TOBACCO NON-USER: CPT | Performed by: FAMILY MEDICINE

## 2024-04-18 PROCEDURE — 83036 HEMOGLOBIN GLYCOSYLATED A1C: CPT

## 2024-04-18 PROCEDURE — 85025 COMPLETE CBC W/AUTO DIFF WBC: CPT

## 2024-04-18 PROCEDURE — 73030 X-RAY EXAM OF SHOULDER: CPT | Mod: RT

## 2024-04-18 PROCEDURE — 36415 COLL VENOUS BLD VENIPUNCTURE: CPT

## 2024-04-18 PROCEDURE — 80053 COMPREHEN METABOLIC PANEL: CPT

## 2024-04-18 NOTE — PATIENT INSTRUCTIONS
For allergy symptoms  -   You can try the over the counter long acting antihistamines :   Claritin  ( loratadine)  or Allegra  (fexofenadine) or Zyrtec (cetirizine) or Xyzal  (levo-cetirizine).   You could also try the steroid nasal sprays:   Fluticasone (Flonase),  Nasacort, or Rhinocort.  Be sure to use them as directed.   If you  do use them,  after you insert them into your nose,   tilt outward toward your eye to avoid nose bleeds.   You may need to stay on these through your allergy season.   You might need only one of these,   but some people need both to control symptoms.    If you try these things for 2 weeks,  and they do not help,  stop them and make an appointment in the office.      Also - for  allergy eye symptoms   (redness, itch , watering)    -  you can try Olopatadine  - an over the counter eye drop that works very well.       Xray of the shoulder today       If hearing is not getting better  - will need to see ENT   DR Mosher  [phone]      Will mail test results     Appt     3 - 4 months    - Medicare wellness

## 2024-04-18 NOTE — PROGRESS NOTES
Subjective   Patient ID: Andreia Camejo is a 89 y.o. female who presents for Follow-up (6 month follow up.  Wants her ears checked because her hearing has gotten worse since she was in the hospital.  The other day she went to reach with her right arm and something snapped in her shoulder and it has been hurting ever since.).    HPI     Here with DTR     LOV   1/2024 - hospital follow up     Updates and concerns:    [place] much worse lately  -   Both ears   No pain       More nasal congestion  -   Always worse in spring and fall   Not on anything for it   States mucous is clear     Pain right shoulder   Reached for something the other day - heard a snap  -   Pain with extending outward           Chronic issues reviewed today:       Severe DDD of neck and low back -   In the past told PMR - but has not gone back to Rheum   On Gabapentin  300 mg  QID     Cardiac issues -    CAD/ HX of PCI   Pacemaker  Parox afib   Mild aortic stenosis on echo 1/2024   Sees DR Waters   Carvedilol 3.125 mg BID   Clopidogrel 75 mg a day   Eliquis 5mg BID   Rosuvastatin 10 mg a day     DMT 2 -   Not on any meds -   Blood sugars in the hospital -  128 - 235     Gets recurrent UTIs     CRF - Creatinine -  runs 1.01 - 1.49     MD - sees optho - gets inj regularly       [place] did medicare wellness  5/8/23        Lives alone -   DTR lives next door   Has aids to help       Vaccines  -    Cannot get flu shots  - allergic   Covid -  had the primary series   Pneumonia - had 13 and 23  - last one 2016   RSV       Review of Systems    Objective   /68 (BP Location: Left arm, Patient Position: Sitting, BP Cuff Size: Large adult)   Pulse 68   Wt 83.5 kg (184 lb)   SpO2 97%   BMI 27.98 kg/m²     Physical Exam  Vitals reviewed.   Constitutional:       General: She is not in acute distress.     Appearance: She is obese.      Comments: Using walker    HENT:      Head: Normocephalic and atraumatic.      Right Ear: Tympanic membrane and ear canal  normal.      Left Ear: Tympanic membrane and ear canal normal.      Nose: Congestion present.      Comments: Nares swollen shut     Mouth/Throat:      Mouth: Mucous membranes are moist.      Pharynx: No posterior oropharyngeal erythema.   Eyes:      Extraocular Movements: Extraocular movements intact.      Conjunctiva/sclera: Conjunctivae normal.      Pupils: Pupils are equal, round, and reactive to light.   Cardiovascular:      Rate and Rhythm: Normal rate. Rhythm irregular.      Heart sounds: Murmur heard.   Pulmonary:      Effort: Pulmonary effort is normal. No respiratory distress.      Breath sounds: Normal breath sounds. No wheezing.   Musculoskeletal:      Cervical back: No rigidity.      Comments: Very tender over AC joint right  - able to elevate - more pain with abduction  - can get to 90 degrees    Lymphadenopathy:      Cervical: No cervical adenopathy.   Skin:     General: Skin is warm and dry.      Findings: No rash.   Neurological:      Mental Status: She is alert. Mental status is at baseline.   Psychiatric:         Mood and Affect: Mood normal.         Thought Content: Thought content normal.         Assessment/Plan   Problem List Items Addressed This Visit             ICD-10-CM       High    Benign essential hypertension - Primary I10    Relevant Orders    Comprehensive Metabolic Panel (Completed)    CBC and Auto Differential (Completed)    Hemoglobin A1C (Completed)    Thyroid Stimulating Hormone (Completed)    Chronic kidney disease, stage 3b (Multi) N18.32    Type 2 diabetes mellitus, without long-term current use of insulin (Multi) E11.9    Relevant Orders    Comprehensive Metabolic Panel (Completed)    CBC and Auto Differential (Completed)    Hemoglobin A1C (Completed)    Thyroid Stimulating Hormone (Completed)    Polymyalgia rheumatica (Multi) M35.3     Has not been back to rheumatology             Medium    Allergic rhinitis J30.9    Exudative age-related macular degeneration, bilateral, with  active choroidal neovascularization (Multi) H35.3231     Sees ophthalmology - getting injections regularly          Thrombocytopenia (CMS-HCC) D69.6     Mild, stable, just monitoring           Other Visit Diagnoses         Codes    Acute pain of right shoulder     M25.511    Relevant Orders    XR shoulder right 2+ views    Hearing loss, unspecified hearing loss type, unspecified laterality     H91.90          Todays major issues  Worsening  [place] -   But sinuses very congested - she states mucous is clear - will try flonase first - INB to ENT     Pain right shoulder - start with xray       I had a  discussion  with patient  about their elevated BMI   and/ or  provided information  on how to improve it with better nutrition and exercise.       We discussed at visit any disease processes that were of concern as well as the risks, benefits and instructions of any new medication provided.    See orders and discussion section for information handed to patient on their Clinical Summary.   Patient (and/or caretaker of patient if present)  stated all questions were answered, and they voiced understanding of instructions.

## 2024-04-19 PROBLEM — I45.9: Status: RESOLVED | Noted: 2024-01-19 | Resolved: 2024-04-19

## 2024-04-19 LAB
EST. AVERAGE GLUCOSE BLD GHB EST-MCNC: 186 MG/DL
HBA1C MFR BLD: 8.1 %

## 2024-05-21 ENCOUNTER — APPOINTMENT (OUTPATIENT)
Dept: PRIMARY CARE | Facility: CLINIC | Age: 89
End: 2024-05-21
Payer: MEDICARE

## 2024-05-28 DIAGNOSIS — I25.10 CORONARY ARTERY DISEASE INVOLVING NATIVE CORONARY ARTERY OF NATIVE HEART WITHOUT ANGINA PECTORIS: ICD-10-CM

## 2024-05-28 RX ORDER — CLOPIDOGREL BISULFATE 75 MG/1
75 TABLET ORAL DAILY
Qty: 90 TABLET | Refills: 3 | Status: SHIPPED | OUTPATIENT
Start: 2024-05-28

## 2024-05-28 RX ORDER — CARVEDILOL 3.12 MG/1
3.12 TABLET ORAL
Qty: 180 TABLET | Refills: 3 | Status: SHIPPED | OUTPATIENT
Start: 2024-05-28

## 2024-07-05 ENCOUNTER — APPOINTMENT (OUTPATIENT)
Dept: RADIOLOGY | Facility: HOSPITAL | Age: 89
End: 2024-07-05
Payer: MEDICARE

## 2024-07-05 ENCOUNTER — HOSPITAL ENCOUNTER (OUTPATIENT)
Facility: HOSPITAL | Age: 89
Setting detail: OBSERVATION
Discharge: HOME | End: 2024-07-07
Attending: EMERGENCY MEDICINE | Admitting: INTERNAL MEDICINE
Payer: MEDICARE

## 2024-07-05 DIAGNOSIS — M25.552 ACUTE HIP PAIN, LEFT: ICD-10-CM

## 2024-07-05 DIAGNOSIS — M25.552 HIP PAIN, ACUTE, LEFT: Primary | ICD-10-CM

## 2024-07-05 PROCEDURE — 85025 COMPLETE CBC W/AUTO DIFF WBC: CPT | Performed by: PHYSICIAN ASSISTANT

## 2024-07-05 PROCEDURE — 99285 EMERGENCY DEPT VISIT HI MDM: CPT

## 2024-07-05 PROCEDURE — 85652 RBC SED RATE AUTOMATED: CPT | Mod: GEALAB | Performed by: INTERNAL MEDICINE

## 2024-07-05 PROCEDURE — 83880 ASSAY OF NATRIURETIC PEPTIDE: CPT | Mod: GEALAB | Performed by: INTERNAL MEDICINE

## 2024-07-05 PROCEDURE — 73502 X-RAY EXAM HIP UNI 2-3 VIEWS: CPT | Mod: LT

## 2024-07-05 PROCEDURE — 36415 COLL VENOUS BLD VENIPUNCTURE: CPT | Performed by: PHYSICIAN ASSISTANT

## 2024-07-05 PROCEDURE — 2500000001 HC RX 250 WO HCPCS SELF ADMINISTERED DRUGS (ALT 637 FOR MEDICARE OP): Performed by: PHYSICIAN ASSISTANT

## 2024-07-05 PROCEDURE — 73502 X-RAY EXAM HIP UNI 2-3 VIEWS: CPT | Mod: LEFT SIDE | Performed by: RADIOLOGY

## 2024-07-05 RX ORDER — HYDROCODONE BITARTRATE AND ACETAMINOPHEN 5; 325 MG/1; MG/1
1 TABLET ORAL ONCE
Status: COMPLETED | OUTPATIENT
Start: 2024-07-05 | End: 2024-07-05

## 2024-07-05 RX ADMIN — HYDROCODONE BITARTRATE AND ACETAMINOPHEN 1 TABLET: 5; 325 TABLET ORAL at 21:55

## 2024-07-05 ASSESSMENT — PAIN - FUNCTIONAL ASSESSMENT
PAIN_FUNCTIONAL_ASSESSMENT: 0-10
PAIN_FUNCTIONAL_ASSESSMENT: 0-10

## 2024-07-05 ASSESSMENT — PAIN SCALES - GENERAL
PAINLEVEL_OUTOF10: 5 - MODERATE PAIN
PAINLEVEL_OUTOF10: 2
PAINLEVEL_OUTOF10: 0 - NO PAIN
PAINLEVEL_OUTOF10: 0 - NO PAIN

## 2024-07-05 ASSESSMENT — COLUMBIA-SUICIDE SEVERITY RATING SCALE - C-SSRS
6. HAVE YOU EVER DONE ANYTHING, STARTED TO DO ANYTHING, OR PREPARED TO DO ANYTHING TO END YOUR LIFE?: NO
2. HAVE YOU ACTUALLY HAD ANY THOUGHTS OF KILLING YOURSELF?: NO
1. IN THE PAST MONTH, HAVE YOU WISHED YOU WERE DEAD OR WISHED YOU COULD GO TO SLEEP AND NOT WAKE UP?: NO

## 2024-07-05 ASSESSMENT — PAIN DESCRIPTION - LOCATION: LOCATION: HIP

## 2024-07-05 ASSESSMENT — PAIN DESCRIPTION - ORIENTATION: ORIENTATION: LEFT

## 2024-07-06 ENCOUNTER — HOSPITAL ENCOUNTER (OUTPATIENT)
Dept: CARDIOLOGY | Facility: HOSPITAL | Age: 89
Discharge: HOME | End: 2024-07-06
Payer: MEDICARE

## 2024-07-06 ENCOUNTER — APPOINTMENT (OUTPATIENT)
Dept: RADIOLOGY | Facility: HOSPITAL | Age: 89
End: 2024-07-06
Payer: MEDICARE

## 2024-07-06 PROBLEM — M25.552 HIP PAIN, ACUTE, LEFT: Status: ACTIVE | Noted: 2024-07-06

## 2024-07-06 PROBLEM — M25.552 ACUTE HIP PAIN, LEFT: Status: ACTIVE | Noted: 2024-07-06

## 2024-07-06 LAB
ALBUMIN SERPL BCP-MCNC: 3.5 G/DL (ref 3.4–5)
ALBUMIN SERPL BCP-MCNC: 3.6 G/DL (ref 3.4–5)
ALP SERPL-CCNC: 48 U/L (ref 33–136)
ALT SERPL W P-5'-P-CCNC: 17 U/L (ref 7–45)
ANION GAP SERPL CALC-SCNC: 11 MMOL/L (ref 10–20)
ANION GAP SERPL CALC-SCNC: 12 MMOL/L (ref 10–20)
APPEARANCE UR: ABNORMAL
AST SERPL W P-5'-P-CCNC: 28 U/L (ref 9–39)
BACTERIA #/AREA URNS AUTO: ABNORMAL /HPF
BASOPHILS # BLD AUTO: 0.06 X10*3/UL (ref 0–0.1)
BASOPHILS NFR BLD AUTO: 0.6 %
BILIRUB SERPL-MCNC: 0.3 MG/DL (ref 0–1.2)
BILIRUB UR STRIP.AUTO-MCNC: NEGATIVE MG/DL
BNP SERPL-MCNC: 119 PG/ML (ref 0–99)
BUN SERPL-MCNC: 29 MG/DL (ref 6–23)
BUN SERPL-MCNC: 30 MG/DL (ref 6–23)
CALCIUM SERPL-MCNC: 8.3 MG/DL (ref 8.6–10.3)
CALCIUM SERPL-MCNC: 8.6 MG/DL (ref 8.6–10.3)
CHLORIDE SERPL-SCNC: 102 MMOL/L (ref 98–107)
CHLORIDE SERPL-SCNC: 104 MMOL/L (ref 98–107)
CK SERPL-CCNC: 52 U/L (ref 0–215)
CO2 SERPL-SCNC: 27 MMOL/L (ref 21–32)
CO2 SERPL-SCNC: 28 MMOL/L (ref 21–32)
COLOR UR: YELLOW
CREAT SERPL-MCNC: 1.21 MG/DL (ref 0.5–1.05)
CREAT SERPL-MCNC: 1.29 MG/DL (ref 0.5–1.05)
CRP SERPL-MCNC: 0.19 MG/DL
EGFRCR SERPLBLD CKD-EPI 2021: 39 ML/MIN/1.73M*2
EGFRCR SERPLBLD CKD-EPI 2021: 42 ML/MIN/1.73M*2
EOSINOPHIL # BLD AUTO: 0.13 X10*3/UL (ref 0–0.4)
EOSINOPHIL NFR BLD AUTO: 1.3 %
ERYTHROCYTE [DISTWIDTH] IN BLOOD BY AUTOMATED COUNT: 12.9 % (ref 11.5–14.5)
ERYTHROCYTE [SEDIMENTATION RATE] IN BLOOD BY WESTERGREN METHOD: 36 MM/H (ref 0–30)
GLUCOSE BLD MANUAL STRIP-MCNC: 201 MG/DL (ref 74–99)
GLUCOSE BLD MANUAL STRIP-MCNC: 295 MG/DL (ref 74–99)
GLUCOSE BLD MANUAL STRIP-MCNC: 321 MG/DL (ref 74–99)
GLUCOSE BLD MANUAL STRIP-MCNC: 349 MG/DL (ref 74–99)
GLUCOSE SERPL-MCNC: 144 MG/DL (ref 74–99)
GLUCOSE SERPL-MCNC: 340 MG/DL (ref 74–99)
GLUCOSE UR STRIP.AUTO-MCNC: ABNORMAL MG/DL
HCT VFR BLD AUTO: 38.8 % (ref 36–46)
HGB BLD-MCNC: 12.9 G/DL (ref 12–16)
HOLD SPECIMEN: NORMAL
IMM GRANULOCYTES # BLD AUTO: 0.03 X10*3/UL (ref 0–0.5)
IMM GRANULOCYTES NFR BLD AUTO: 0.3 % (ref 0–0.9)
KETONES UR STRIP.AUTO-MCNC: NEGATIVE MG/DL
LEUKOCYTE ESTERASE UR QL STRIP.AUTO: ABNORMAL
LYMPHOCYTES # BLD AUTO: 2.09 X10*3/UL (ref 0.8–3)
LYMPHOCYTES NFR BLD AUTO: 21 %
MAGNESIUM SERPL-MCNC: 1.97 MG/DL (ref 1.6–2.4)
MCH RBC QN AUTO: 33.8 PG (ref 26–34)
MCHC RBC AUTO-ENTMCNC: 33.2 G/DL (ref 32–36)
MCV RBC AUTO: 102 FL (ref 80–100)
MONOCYTES # BLD AUTO: 1.23 X10*3/UL (ref 0.05–0.8)
MONOCYTES NFR BLD AUTO: 12.4 %
MUCOUS THREADS #/AREA URNS AUTO: ABNORMAL /LPF
NEUTROPHILS # BLD AUTO: 6.39 X10*3/UL (ref 1.6–5.5)
NEUTROPHILS NFR BLD AUTO: 64.4 %
NITRITE UR QL STRIP.AUTO: NEGATIVE
NRBC BLD-RTO: 0 /100 WBCS (ref 0–0)
PH UR STRIP.AUTO: 5.5 [PH]
PHOSPHATE SERPL-MCNC: 4.2 MG/DL (ref 2.5–4.9)
PLATELET # BLD AUTO: 150 X10*3/UL (ref 150–450)
POTASSIUM SERPL-SCNC: 5.1 MMOL/L (ref 3.5–5.3)
POTASSIUM SERPL-SCNC: 5.6 MMOL/L (ref 3.5–5.3)
PROT SERPL-MCNC: 6.5 G/DL (ref 6.4–8.2)
PROT UR STRIP.AUTO-MCNC: ABNORMAL MG/DL
RBC # BLD AUTO: 3.82 X10*6/UL (ref 4–5.2)
RBC # UR STRIP.AUTO: NEGATIVE /UL
RBC #/AREA URNS AUTO: ABNORMAL /HPF
SODIUM SERPL-SCNC: 136 MMOL/L (ref 136–145)
SODIUM SERPL-SCNC: 137 MMOL/L (ref 136–145)
SP GR UR STRIP.AUTO: 1.02
SQUAMOUS #/AREA URNS AUTO: ABNORMAL /HPF
UROBILINOGEN UR STRIP.AUTO-MCNC: NORMAL MG/DL
WBC # BLD AUTO: 9.9 X10*3/UL (ref 4.4–11.3)
WBC #/AREA URNS AUTO: >50 /HPF

## 2024-07-06 PROCEDURE — 99223 1ST HOSP IP/OBS HIGH 75: CPT

## 2024-07-06 PROCEDURE — 80069 RENAL FUNCTION PANEL: CPT | Mod: CCI | Performed by: INTERNAL MEDICINE

## 2024-07-06 PROCEDURE — 71045 X-RAY EXAM CHEST 1 VIEW: CPT | Performed by: RADIOLOGY

## 2024-07-06 PROCEDURE — 81001 URINALYSIS AUTO W/SCOPE: CPT | Performed by: PHYSICIAN ASSISTANT

## 2024-07-06 PROCEDURE — 87086 URINE CULTURE/COLONY COUNT: CPT | Mod: GEALAB | Performed by: PHYSICIAN ASSISTANT

## 2024-07-06 PROCEDURE — 96374 THER/PROPH/DIAG INJ IV PUSH: CPT

## 2024-07-06 PROCEDURE — 96375 TX/PRO/DX INJ NEW DRUG ADDON: CPT

## 2024-07-06 PROCEDURE — 97165 OT EVAL LOW COMPLEX 30 MIN: CPT | Mod: GO | Performed by: OCCUPATIONAL THERAPIST

## 2024-07-06 PROCEDURE — 2500000005 HC RX 250 GENERAL PHARMACY W/O HCPCS

## 2024-07-06 PROCEDURE — G0378 HOSPITAL OBSERVATION PER HR: HCPCS

## 2024-07-06 PROCEDURE — 83735 ASSAY OF MAGNESIUM: CPT | Performed by: PHYSICIAN ASSISTANT

## 2024-07-06 PROCEDURE — 2500000005 HC RX 250 GENERAL PHARMACY W/O HCPCS: Performed by: PHYSICIAN ASSISTANT

## 2024-07-06 PROCEDURE — 36415 COLL VENOUS BLD VENIPUNCTURE: CPT | Performed by: PHYSICIAN ASSISTANT

## 2024-07-06 PROCEDURE — 86140 C-REACTIVE PROTEIN: CPT | Performed by: INTERNAL MEDICINE

## 2024-07-06 PROCEDURE — 2500000002 HC RX 250 W HCPCS SELF ADMINISTERED DRUGS (ALT 637 FOR MEDICARE OP, ALT 636 FOR OP/ED)

## 2024-07-06 PROCEDURE — 97161 PT EVAL LOW COMPLEX 20 MIN: CPT | Mod: GP

## 2024-07-06 PROCEDURE — 71045 X-RAY EXAM CHEST 1 VIEW: CPT

## 2024-07-06 PROCEDURE — 80053 COMPREHEN METABOLIC PANEL: CPT | Performed by: PHYSICIAN ASSISTANT

## 2024-07-06 PROCEDURE — 72192 CT PELVIS W/O DYE: CPT

## 2024-07-06 PROCEDURE — 2500000001 HC RX 250 WO HCPCS SELF ADMINISTERED DRUGS (ALT 637 FOR MEDICARE OP)

## 2024-07-06 PROCEDURE — 2500000004 HC RX 250 GENERAL PHARMACY W/ HCPCS (ALT 636 FOR OP/ED): Performed by: PHYSICIAN ASSISTANT

## 2024-07-06 PROCEDURE — 82550 ASSAY OF CK (CPK): CPT | Performed by: INTERNAL MEDICINE

## 2024-07-06 PROCEDURE — 93005 ELECTROCARDIOGRAM TRACING: CPT

## 2024-07-06 PROCEDURE — 36415 COLL VENOUS BLD VENIPUNCTURE: CPT | Performed by: INTERNAL MEDICINE

## 2024-07-06 PROCEDURE — 72192 CT PELVIS W/O DYE: CPT | Performed by: RADIOLOGY

## 2024-07-06 PROCEDURE — 82947 ASSAY GLUCOSE BLOOD QUANT: CPT

## 2024-07-06 RX ORDER — POLYETHYLENE GLYCOL 3350 17 G/17G
17 POWDER, FOR SOLUTION ORAL DAILY PRN
Status: DISCONTINUED | OUTPATIENT
Start: 2024-07-06 | End: 2024-07-07 | Stop reason: HOSPADM

## 2024-07-06 RX ORDER — AMOXICILLIN 250 MG
1 CAPSULE ORAL NIGHTLY PRN
Status: DISCONTINUED | OUTPATIENT
Start: 2024-07-06 | End: 2024-07-07 | Stop reason: HOSPADM

## 2024-07-06 RX ORDER — LIDOCAINE 560 MG/1
1 PATCH PERCUTANEOUS; TOPICAL; TRANSDERMAL DAILY
Status: DISCONTINUED | OUTPATIENT
Start: 2024-07-06 | End: 2024-07-07 | Stop reason: HOSPADM

## 2024-07-06 RX ORDER — GABAPENTIN 300 MG/1
300 CAPSULE ORAL 2 TIMES DAILY
Status: DISCONTINUED | OUTPATIENT
Start: 2024-07-06 | End: 2024-07-07 | Stop reason: HOSPADM

## 2024-07-06 RX ORDER — INSULIN LISPRO 100 [IU]/ML
0-10 INJECTION, SOLUTION INTRAVENOUS; SUBCUTANEOUS
Status: DISCONTINUED | OUTPATIENT
Start: 2024-07-06 | End: 2024-07-07 | Stop reason: HOSPADM

## 2024-07-06 RX ORDER — OXYCODONE HYDROCHLORIDE 5 MG/1
5 TABLET ORAL EVERY 6 HOURS PRN
Status: DISCONTINUED | OUTPATIENT
Start: 2024-07-06 | End: 2024-07-07 | Stop reason: HOSPADM

## 2024-07-06 RX ORDER — ACETAMINOPHEN 325 MG/1
975 TABLET ORAL EVERY 8 HOURS
Status: DISCONTINUED | OUTPATIENT
Start: 2024-07-06 | End: 2024-07-07 | Stop reason: HOSPADM

## 2024-07-06 RX ORDER — DEXTROSE 50 % IN WATER (D50W) INTRAVENOUS SYRINGE
12.5
Status: DISCONTINUED | OUTPATIENT
Start: 2024-07-06 | End: 2024-07-07 | Stop reason: HOSPADM

## 2024-07-06 RX ORDER — ROSUVASTATIN CALCIUM 10 MG/1
10 TABLET, COATED ORAL NIGHTLY
Status: DISCONTINUED | OUTPATIENT
Start: 2024-07-06 | End: 2024-07-07 | Stop reason: HOSPADM

## 2024-07-06 RX ORDER — TALC
3 POWDER (GRAM) TOPICAL NIGHTLY PRN
Status: DISCONTINUED | OUTPATIENT
Start: 2024-07-06 | End: 2024-07-07 | Stop reason: HOSPADM

## 2024-07-06 RX ORDER — DEXTROSE 50 % IN WATER (D50W) INTRAVENOUS SYRINGE
25
Status: DISCONTINUED | OUTPATIENT
Start: 2024-07-06 | End: 2024-07-07 | Stop reason: HOSPADM

## 2024-07-06 RX ORDER — CLOPIDOGREL BISULFATE 75 MG/1
75 TABLET ORAL DAILY
Status: DISCONTINUED | OUTPATIENT
Start: 2024-07-06 | End: 2024-07-07 | Stop reason: HOSPADM

## 2024-07-06 RX ORDER — CARVEDILOL 3.12 MG/1
3.12 TABLET ORAL
Status: DISCONTINUED | OUTPATIENT
Start: 2024-07-06 | End: 2024-07-07 | Stop reason: HOSPADM

## 2024-07-06 RX ORDER — OXYCODONE HYDROCHLORIDE 5 MG/1
10 TABLET ORAL EVERY 6 HOURS PRN
Status: DISCONTINUED | OUTPATIENT
Start: 2024-07-06 | End: 2024-07-07 | Stop reason: HOSPADM

## 2024-07-06 RX ORDER — MORPHINE SULFATE 4 MG/ML
4 INJECTION INTRAVENOUS ONCE
Status: COMPLETED | OUTPATIENT
Start: 2024-07-06 | End: 2024-07-06

## 2024-07-06 RX ADMIN — MORPHINE SULFATE 4 MG: 4 INJECTION INTRAVENOUS at 00:54

## 2024-07-06 RX ADMIN — Medication 2 L/MIN: at 08:00

## 2024-07-06 RX ADMIN — APIXABAN 5 MG: 5 TABLET, FILM COATED ORAL at 08:43

## 2024-07-06 RX ADMIN — CLOPIDOGREL 75 MG: 75 TABLET ORAL at 08:43

## 2024-07-06 RX ADMIN — CARVEDILOL 3.12 MG: 3.12 TABLET, FILM COATED ORAL at 17:35

## 2024-07-06 RX ADMIN — INSULIN LISPRO 8 UNITS: 100 INJECTION, SOLUTION INTRAVENOUS; SUBCUTANEOUS at 17:35

## 2024-07-06 RX ADMIN — CARVEDILOL 3.12 MG: 3.12 TABLET, FILM COATED ORAL at 08:43

## 2024-07-06 RX ADMIN — ACETAMINOPHEN 975 MG: 325 TABLET ORAL at 23:45

## 2024-07-06 RX ADMIN — ACETAMINOPHEN 975 MG: 325 TABLET ORAL at 17:35

## 2024-07-06 RX ADMIN — INSULIN LISPRO 6 UNITS: 100 INJECTION, SOLUTION INTRAVENOUS; SUBCUTANEOUS at 12:32

## 2024-07-06 RX ADMIN — GABAPENTIN 300 MG: 300 CAPSULE ORAL at 20:14

## 2024-07-06 RX ADMIN — LIDOCAINE 4% 1 PATCH: 40 PATCH TOPICAL at 08:44

## 2024-07-06 RX ADMIN — ACETAMINOPHEN 975 MG: 325 TABLET ORAL at 08:43

## 2024-07-06 RX ADMIN — APIXABAN 5 MG: 5 TABLET, FILM COATED ORAL at 20:14

## 2024-07-06 RX ADMIN — Medication 1 L/MIN: at 08:43

## 2024-07-06 RX ADMIN — Medication 2 L/MIN: at 01:00

## 2024-07-06 RX ADMIN — ROSUVASTATIN CALCIUM 10 MG: 10 TABLET, FILM COATED ORAL at 20:14

## 2024-07-06 RX ADMIN — INSULIN LISPRO 4 UNITS: 100 INJECTION, SOLUTION INTRAVENOUS; SUBCUTANEOUS at 08:44

## 2024-07-06 RX ADMIN — METHYLPREDNISOLONE SODIUM SUCCINATE 125 MG: 125 INJECTION, POWDER, FOR SOLUTION INTRAMUSCULAR; INTRAVENOUS at 00:59

## 2024-07-06 RX ADMIN — GABAPENTIN 300 MG: 300 CAPSULE ORAL at 08:43

## 2024-07-06 SDOH — SOCIAL STABILITY: SOCIAL INSECURITY: WERE YOU ABLE TO COMPLETE ALL THE BEHAVIORAL HEALTH SCREENINGS?: YES

## 2024-07-06 SDOH — SOCIAL STABILITY: SOCIAL INSECURITY: HAS ANYONE EVER THREATENED TO HURT YOUR FAMILY OR YOUR PETS?: NO

## 2024-07-06 SDOH — SOCIAL STABILITY: SOCIAL INSECURITY: DOES ANYONE TRY TO KEEP YOU FROM HAVING/CONTACTING OTHER FRIENDS OR DOING THINGS OUTSIDE YOUR HOME?: NO

## 2024-07-06 SDOH — SOCIAL STABILITY: SOCIAL INSECURITY: DO YOU FEEL UNSAFE GOING BACK TO THE PLACE WHERE YOU ARE LIVING?: NO

## 2024-07-06 SDOH — SOCIAL STABILITY: SOCIAL INSECURITY: HAVE YOU HAD ANY THOUGHTS OF HARMING ANYONE ELSE?: NO

## 2024-07-06 SDOH — ECONOMIC STABILITY: HOUSING INSECURITY
IN THE LAST 12 MONTHS, WAS THERE A TIME WHEN YOU DID NOT HAVE A STEADY PLACE TO SLEEP OR SLEPT IN A SHELTER (INCLUDING NOW)?: NO

## 2024-07-06 SDOH — SOCIAL STABILITY: SOCIAL INSECURITY: HAVE YOU HAD THOUGHTS OF HARMING ANYONE ELSE?: NO

## 2024-07-06 SDOH — SOCIAL STABILITY: SOCIAL INSECURITY: DO YOU FEEL ANYONE HAS EXPLOITED OR TAKEN ADVANTAGE OF YOU FINANCIALLY OR OF YOUR PERSONAL PROPERTY?: NO

## 2024-07-06 SDOH — SOCIAL STABILITY: SOCIAL INSECURITY: ABUSE: ADULT

## 2024-07-06 SDOH — SOCIAL STABILITY: SOCIAL INSECURITY: ARE THERE ANY APPARENT SIGNS OF INJURIES/BEHAVIORS THAT COULD BE RELATED TO ABUSE/NEGLECT?: NO

## 2024-07-06 SDOH — SOCIAL STABILITY: SOCIAL INSECURITY: ARE YOU OR HAVE YOU BEEN THREATENED OR ABUSED PHYSICALLY, EMOTIONALLY, OR SEXUALLY BY ANYONE?: NO

## 2024-07-06 ASSESSMENT — ACTIVITIES OF DAILY LIVING (ADL)
ASSISTIVE_DEVICE: WALKER
WALKS IN HOME: NEEDS ASSISTANCE
LACK_OF_TRANSPORTATION: NO
GROOMING: INDEPENDENT
HEARING - LEFT EAR: DIFFICULTY WITH NOISE
TOILETING: NEEDS ASSISTANCE
JUDGMENT_ADEQUATE_SAFELY_COMPLETE_DAILY_ACTIVITIES: YES
FEEDING YOURSELF: INDEPENDENT
LACK_OF_TRANSPORTATION: NO
PATIENT'S MEMORY ADEQUATE TO SAFELY COMPLETE DAILY ACTIVITIES?: YES
ADEQUATE_TO_COMPLETE_ADL: YES
HEARING - RIGHT EAR: DIFFICULTY WITH NOISE
DRESSING YOURSELF: INDEPENDENT
BATHING_ASSISTANCE: MAXIMAL
BATHING: NEEDS ASSISTANCE

## 2024-07-06 ASSESSMENT — COGNITIVE AND FUNCTIONAL STATUS - GENERAL
DRESSING REGULAR UPPER BODY CLOTHING: A LITTLE
TOILETING: A LITTLE
MOBILITY SCORE: 17
MOVING TO AND FROM BED TO CHAIR: A LITTLE
TOILETING: A LITTLE
WALKING IN HOSPITAL ROOM: A LOT
TOILETING: A LOT
DRESSING REGULAR UPPER BODY CLOTHING: A LITTLE
MOVING FROM LYING ON BACK TO SITTING ON SIDE OF FLAT BED WITH BEDRAILS: A LOT
STANDING UP FROM CHAIR USING ARMS: A LITTLE
DRESSING REGULAR LOWER BODY CLOTHING: A LOT
DRESSING REGULAR LOWER BODY CLOTHING: A LOT
WALKING IN HOSPITAL ROOM: A LOT
WALKING IN HOSPITAL ROOM: A LOT
HELP NEEDED FOR BATHING: A LITTLE
MOBILITY SCORE: 11
MOVING TO AND FROM BED TO CHAIR: A LITTLE
STANDING UP FROM CHAIR USING ARMS: A LOT
DAILY ACTIVITIY SCORE: 17
HELP NEEDED FOR BATHING: A LOT
CLIMB 3 TO 5 STEPS WITH RAILING: A LOT
MOBILITY SCORE: 11
WALKING IN HOSPITAL ROOM: A LOT
CLIMB 3 TO 5 STEPS WITH RAILING: TOTAL
TURNING FROM BACK TO SIDE WHILE IN FLAT BAD: A LOT
TURNING FROM BACK TO SIDE WHILE IN FLAT BAD: A LITTLE
HELP NEEDED FOR BATHING: A LOT
MOVING FROM LYING ON BACK TO SITTING ON SIDE OF FLAT BED WITH BEDRAILS: A LOT
HELP NEEDED FOR BATHING: A LITTLE
MOVING TO AND FROM BED TO CHAIR: A LOT
TOILETING: A LOT
STANDING UP FROM CHAIR USING ARMS: A LOT
CLIMB 3 TO 5 STEPS WITH RAILING: A LOT
TURNING FROM BACK TO SIDE WHILE IN FLAT BAD: A LOT
CLIMB 3 TO 5 STEPS WITH RAILING: TOTAL
DRESSING REGULAR UPPER BODY CLOTHING: A LITTLE
TURNING FROM BACK TO SIDE WHILE IN FLAT BAD: A LITTLE
PATIENT BASELINE BEDBOUND: NO
DAILY ACTIVITIY SCORE: 17
MOBILITY SCORE: 17
DRESSING REGULAR UPPER BODY CLOTHING: A LITTLE
DAILY ACTIVITIY SCORE: 19
DRESSING REGULAR LOWER BODY CLOTHING: A LOT
STANDING UP FROM CHAIR USING ARMS: A LITTLE
DRESSING REGULAR LOWER BODY CLOTHING: A LOT
DAILY ACTIVITIY SCORE: 19
MOVING TO AND FROM BED TO CHAIR: A LOT

## 2024-07-06 ASSESSMENT — PAIN SCALES - GENERAL
PAINLEVEL_OUTOF10: 3
PAINLEVEL_OUTOF10: 0 - NO PAIN

## 2024-07-06 ASSESSMENT — ENCOUNTER SYMPTOMS
DIZZINESS: 0
FEVER: 0
VOMITING: 0
WEAKNESS: 0
DYSURIA: 0
CONSTIPATION: 0
JOINT SWELLING: 0
LIGHT-HEADEDNESS: 0
CHILLS: 0
DIARRHEA: 0
APPETITE CHANGE: 0
SHORTNESS OF BREATH: 0
ABDOMINAL PAIN: 0
COUGH: 0
BACK PAIN: 0
APNEA: 0
NUMBNESS: 0
EYE REDNESS: 1
NAUSEA: 0

## 2024-07-06 ASSESSMENT — PAIN DESCRIPTION - LOCATION: LOCATION: HIP

## 2024-07-06 ASSESSMENT — LIFESTYLE VARIABLES
HAVE PEOPLE ANNOYED YOU BY CRITICIZING YOUR DRINKING: NO
HOW OFTEN DO YOU HAVE A DRINK CONTAINING ALCOHOL: NEVER
HOW MANY STANDARD DRINKS CONTAINING ALCOHOL DO YOU HAVE ON A TYPICAL DAY: PATIENT DOES NOT DRINK
EVER FELT BAD OR GUILTY ABOUT YOUR DRINKING: NO
HAVE YOU EVER FELT YOU SHOULD CUT DOWN ON YOUR DRINKING: NO
AUDIT-C TOTAL SCORE: 0
TOTAL SCORE: 0
AUDIT-C TOTAL SCORE: 0
HOW OFTEN DO YOU HAVE 6 OR MORE DRINKS ON ONE OCCASION: NEVER
SKIP TO QUESTIONS 9-10: 1
EVER HAD A DRINK FIRST THING IN THE MORNING TO STEADY YOUR NERVES TO GET RID OF A HANGOVER: NO

## 2024-07-06 ASSESSMENT — PAIN - FUNCTIONAL ASSESSMENT
PAIN_FUNCTIONAL_ASSESSMENT: 0-10

## 2024-07-06 ASSESSMENT — PAIN DESCRIPTION - ORIENTATION: ORIENTATION: LEFT

## 2024-07-06 NOTE — PROGRESS NOTES
Occupational Therapy    Evaluation    Patient Name: Andreia Camejo  MRN: [id number]  Today's Date: 7/6/2024  Time Calculation  Start Time: 0911  Stop Time: 0932  Time Calculation (min): 21 min    Assessment  IP OT Assessment  OT Assessment: Pt at high risk of fall due to L pain in hip limiting pt ability to perform static/dynamic standing, decreased functional mobility; decreased ADL/IADL performance. Pt lives alone and would benefit from skilled OT intervention  Prognosis: Good  Evaluation/Treatment Tolerance: Patient limited by pain  Medical Staff Made Aware: Yes  End of Session Communication: Bedside nurse, Care Coordinator, Physician  End of Session Patient Position: Bed, 3 rail up, Alarm on  Plan:  Treatment Interventions: ADL retraining, Endurance training, Equipment evaluation/education, Compensatory technique education  OT Frequency: 3 times per week  OT Discharge Recommendations: Moderate intensity level of continued care  OT Recommended Transfer Status: Assist of 1  OT - OK to Discharge: Yes    Subjective   Current Problem:  1. Hip pain, acute, left          General:  General  Reason for Referral: OT for ADL and safety assessment  Referred By: Jean Carlos Cadet MD  Past Medical History Relevant to Rehab: 89 YOF presents with c/o L hip pain; all imaging negative. Pt reports no fall; bruising noted on L hip. PMH:CKD IV (baseline Cr 1.01-1.49), pulmonary embolism, a. Fib (Eliquis, pacemaker), T2DM (no meds, A1c 8.1 in April 2024), degenerative disc disease, macular degeneration, CAD, and mild aortic stenosis  Missed Visit: No  Family/Caregiver Present: No  Co-Treatment: PT  Co-Treatment Reason: to maximize pt participation and safety outcomes  Prior to Session Communication: Bedside nurse  Patient Position Received: Bed, 3 rail up, Alarm on  Preferred Learning Style: verbal, visual  General Comment: pt educated on reason for OT consultation and acute services. Pt agreeable to  participate  Precautions:  Hearing/Visual Limitations: Glasses at all times; Hearing WFL per pt  UE Weight Bearing Status: Weight Bearing as Tolerated  LE Weight Bearing Status: Weight Bearing as Tolerated  Medical Precautions: Fall precautions, Oxygen therapy device and L/min (1L O2 via NC)  Vital Signs:     Pain:  Pain Assessment  Pain Assessment: 0-10  0-10 (Numeric) Pain Score:  (2/10 following bed mobility and sitting; 4/10 with standing/attempting to walk)  Pain Type: Acute pain  Pain Location: Hip  Pain Orientation: Left, Outer  Pain Radiating Towards: localized  Pain Frequency:  (with standing/wt bearing)  Effect of Pain on Daily Activities: limits mobility and participation in activities; increased pt risk of falling and potential for increased weakness overall due to inactivity  Patient's Stated Pain Goal: No pain  Pain Interventions:  (nursing aware and managing pt pain)    Objective   Cognition:  Overall Cognitive Status: Within Functional Limits     Confusion Assessment Method (CAM)  Acute Onset and Fluctuating Course (1A): No  Goldstein Agitation Sedation Scale  Goldstein Agitation Sedation Scale (RASS): Alert and calm  Home Living:  Type of Home: House  Lives With: Alone (States dtr has a home on same property and checks on pt 2x/day personally or they talk on the phone)  Home Adaptive Equipment: Cane, Walker rolling or standard (rollator)  Home Layout: One level  Home Access: Stairs to enter with rails  Entrance Stairs-Number of Steps: 3  Bathroom Shower/Tub: Walk-in shower  Bathroom Toilet: Adaptive toilet seating  Bathroom Equipment: Grab bars in shower  Home Living Comments: [facility]HA-for self care 2x/week   Prior Function:  Level of [place]: Independent with ADLs and functional transfers, Independent with homemaking with ambulation  Receives Help From: Family (daughter)  Prior Function Comments: Pt prepares/heats meals; completes light housework; uses rollator seat to transport food tray; sits on  raised stool in kitchen  IADL History:  Current License: No  Mode of Transportation: Car, Family  ADL:  Eating Assistance: Independent  Grooming Assistance: Independent  Bathing Assistance: Maximal  UE Dressing Assistance: Stand by  LE Dressing Assistance: Maximal  Toileting Assistance with Device: Maximal  Functional Assistance: Maximal  ADL Comments: L hip pain directly affects pt ability to perform tasks  Activity Tolerance:  Activity Tolerance Comments: activity limited by pain  Bed Mobility/Transfers: Bed Mobility  Bed Mobility: Yes  Bed Mobility 1  Bed Mobility 1: Supine to sitting  Level of Assistance 1: Close supervision  Bed Mobility 2  Bed Mobility  2: Sitting to supine  Level of Assistance 2: Minimum assistance    Transfers  Transfer: Yes  Transfer 1  Transfer From 1: Bed to, Sit to  Transfer to 1: Stand  Technique 1: Sit to stand  Transfer Device 1: Walker  Transfer Level of Assistance 1: Contact guard  Transfers 2  Transfer From 2: Stand to  Transfer to 2: Sit, Bed  Technique 2: Stand to sit  Transfer Device 2: Walker  Transfer Level of Assistance 2: Contact guard      Functional Mobility:  Functional Mobility  Functional Mobility Performed: No  Sitting Balance:  Static Sitting Balance  Static Sitting-Balance Support: Feet supported, Right upper extremity supported  Static Sitting-Level of Assistance: Independent  Static Sitting-Comment/Number of Minutes: pt leans to the right to offload wt to L hip  Dynamic Sitting Balance  Dynamic Sitting-Balance Support: Feet supported, Right upper extremity supported  Dynamic Sitting-Balance: Lateral lean, Trunk control activities  Dynamic Sitting-Comments: supervision/SBA  Standing Balance:  Static Standing Balance  Static Standing-Balance Support: Bilateral upper extremity supported  Static Standing-Level of Assistance: Minimum assistance  Dynamic Standing Balance  Dynamic Standing-Balance Support: Bilateral upper extremity supported  Dynamic Standing-Balance:  Turning  Dynamic Standing-Comments: min A  Modalities:  Modalities Used: No  IADL's:   Current License: No  Mode of Transportation: Car, Family  Vision: Vision - Basic Assessment  Current Vision: Wears glasses all the time  Sensation:  Light Touch: No apparent deficits  Strength:  Strength Comments: RUE WFL; LUE WFL distally-shoulder not tested due to ROM limitations  Perception:  Inattention/Neglect: Appears intact  Coordination:  Movements are Fluid and Coordinated: Yes   Hand Function:  Hand Function  Gross Grasp: Functional  Coordination: Functional  Extremities: RUE   VANIA :  (Chronic shoulder limitations; distally WFL) and REINAE   DARION: Within Functional Limits      Outcome Measures: [facility] Daily Activity  Putting on and taking off regular lower body clothing: A lot  Bathing (including washing, rinsing, drying): A lot  Putting on and taking off regular upper body clothing: A little  Toileting, which includes using toilet, bedpan or urinal: A lot  Taking care of personal grooming such as brushing teeth: None  Eating Meals: None  Daily Activity - Total Score: 17      Education Documentation  ADL Training, taught by Diana Ly OT at 7/6/2024 12:03 PM.  Learner: Patient  Readiness: Acceptance  Method: Explanation  Response: Verbalizes Understanding    Education Comments  No comments found.      Goals:   Encounter Problems       Encounter Problems (Active)       ADLs       Patient with complete lower body dressing with independent level of assistance donning and doffing all LE clothes  with PRN adaptive equipment while edge of bed  and standing       Start:  07/06/24    Expected End:  07/20/24            Patient will complete daily grooming tasks brushing teeth and washing face/hair with independent level of assistance and PRN adaptive equipment while standing.       Start:  07/06/24    Expected End:  07/20/24            Patient will complete toileting including hygiene clothing management/hygiene with independent  level of assistance and raised toilet seat and grab bars.       Start:  07/06/24    Expected End:  07/20/24               EXERCISE/STRENGTHENING       Patient will be educated on BUE HEP for increased ADL performance.       Start:  07/06/24    Expected End:  07/20/24               MOBILITY       Patient will perform Functional mobility no external Household distances/Community Distances with independent level of assistance and least restrictive device in order to improve safety and functional mobility.       Start:  07/06/24    Expected End:  07/20/24               TRANSFERS       Patient will complete functional transfer with least restrictive device with independent level of assistance.       Start:  07/06/24    Expected End:  07/20/24

## 2024-07-06 NOTE — PROGRESS NOTES
"Subjective   Subjective:   History Of Present Illness:  Andreia Camejo is a 89 y.o. female was seen and evaluated at bedside today. Overnight, no reported acute events. Patient is at no acute distress. Reports no trauma to hip/leg that she remembers, but does have bruise over left lateral thigh. She has not tried to stand on it today but reported she had issues bearing weight yesterday secondary to pain. She is not currently feeling short of breath but is on oxygen, she does not use at baseline. She ambulates at home at baseline, has not attempted to get up out of bed yet here.         Objective   Objective:     /76 (BP Location: Right arm, Patient Position: Lying)   Pulse 76   Temp 35.8 °C (96.4 °F) (Temporal)   Resp 16   Ht 1.727 m (5' 8\")   Wt 84.8 kg (187 lb)   SpO2 93%   BMI 28.43 kg/m²     Physical Exam  Constitutional:       Appearance: Normal appearance.   Cardiovascular:      Rate and Rhythm: Normal rate and regular rhythm.      Heart sounds: Murmur heard.      Comments: Systolic, right upper sternal border  Pulmonary:      Effort: Pulmonary effort is normal.      Breath sounds: Normal breath sounds.   Musculoskeletal:         General: Tenderness present.      Comments: Left leg   Skin:     General: Skin is warm and dry.      Findings: Bruising present.      Comments: Left leg bruising   Neurological:      Mental Status: She is alert and oriented to person, place, and time.       Lab Work:     Lab Results   Component Value Date    WBC 9.9 07/05/2024    HGB 12.9 07/05/2024    HCT 38.8 07/05/2024     (H) 07/05/2024     07/05/2024     Lab Results   Component Value Date    GLUCOSE 144 (H) 07/06/2024    CALCIUM 8.3 (L) 07/06/2024     07/06/2024    K 5.6 (H) 07/06/2024    CO2 28 07/06/2024     07/06/2024    BUN 30 (H) 07/06/2024    CREATININE 1.29 (H) 07/06/2024     POC HEMOGLOBIN A1c   Date Value Ref Range Status   11/15/2023 8.3 (A) 4.2 - 6.5 % Final   05/08/2023 8.8 (A) " 4.2 - 6.5 % Final     Hemoglobin A1C   Date Value Ref Range Status   04/18/2024 8.1 (H) see below % Final     Thyroid Stimulating Hormone   Date Value Ref Range Status   04/18/2024 0.97 0.44 - 3.98 mIU/L Final     TSH   Date Value Ref Range Status   05/19/2021 1.00 0.44 - 3.98 mIU/L Final     Comment:      TSH testing is performed using different testing    methodology at [facility] than at other    system hospitals. Direct result comparisons should    only be made within the same method.     10/17/2019 1.03 0.44 - 3.98 mIU/L Final     Comment:      TSH testing is performed using different testing    methodology at [facility] than at other    system hospitals. Direct result comparisons should    only be made within the same method.  .   Patients receiving more than 5 mg/day of biotin may have interference   in test results.  A sample should be taken no sooner than eight hours   after  previous dose. Contact [phone] for additional information.       Cultures:   No results found for the last 90 days.    Images:     CT pelvis wo IV contrast   Final Result   Left femoral arthroplasty hardware is similar in appearance compared   to prior imaging in 2021 without surrounding lucency or   periprosthetic fracture.             MACRO:   None.        Signed by: Evan Finkelstein 7/6/2024 3:39 AM   Dictation workstation:   [id number]      XR hip left with pelvis when performed 2 or 3 views   Final Result   1. Left total hip arthroplasty without evidence of acute hardware   complication or malalignment.   2. No radiographically evident fracture or dislocation.                  Signed by: Titus Ho 7/5/2024 10:10 PM   Dictation workstation:   [id number]         Medications:     Scheduled:  acetaminophen, 975 mg, oral, q8h  apixaban, 5 mg, oral, q12h  carvedilol, 3.125 mg, oral, BID  clopidogrel, 75 mg, oral, Daily  gabapentin, 300 mg, oral, BID  insulin lispro, 0-10 Units, subcutaneous,  TID  lidocaine, 1 patch, transdermal, Daily  rosuvastatin, 10 mg, oral, Nightly    Continuous:     PRN:  PRN medications: dextrose, dextrose, glucagon, glucagon, HYDROmorphone, melatonin, oxyCODONE, oxyCODONE, polyethylene glycol, sennosides-docusate sodium     Assessment & Plan:     Andreia Camejo is a 89 y.o. female  with pmhx CKD IV (baseline Cr 1.01-1.49), pulmonary embolism, a. Fib (Eliquis, pacemaker), T2DM (no meds, A1c 8.1 in April 2024), degenerative disc disease, macular degeneration, CAD, and mild aortic stenosis  admitted 7/5/24 for intractable left leg pain    ACUTE MEDICAL ISSUES:  #Left leg pain:   - likely secondary to trauma  - Likely spasm vs bone bruise  - X-ray/CT negative for fracture/acute processes  - No evidence infection, afebrile, no leukocytosis   - Ecchymosis present  PLAN:  - PT/OT evaluation   - Pain management: acetaminophen scheduled, PRN oxycodone, dilaudid for breakthrough; lidocaine patch    #Hyperkalemia:   - Likely falsely elevated due to moderately hemolyzed sample   - She is asymptomatic  PLAN:  - Will recheck tomorrow AM if EKG ok  - EKG  - Avoid excessive intake        CHRONIC ISSUES:  #CKD - Cr likely ~ baseline, though difficult to determine baseline (ranges from 1.01 - 1.49 in past encounters)  #CAD - continue home clopidogrel  #HLD - continue home rosuvastatin  #T2DM - SSI   # Atrial fibrillation - continue home Eliquis, carvedilol, keep on telemetry    Fluid: Replete PRN  Electrolytes: Replete PRN  Nutrition: carb controlled  DVT/PE PPx: Eliquis  IV Lines: PIV  O2: On 3L satting 97%, wean as tolerated    Disposition: Likely discharge home w/ home health PT/OT if unable to go to SNF    Patricia Najera, DO  Internal Medicine PGY-1    Disclaimer: Documentation completed with the information available at the time of input. The times in the chart may not be reflective of actual patient care times, interventions, or procedures. Documentation occurs after the physical care of the  patient.

## 2024-07-06 NOTE — PROGRESS NOTES
07/06/24 1057   Discharge Planning   Living Arrangements Alone   Support Systems Children   Assistance Needed A&0x3, ambulates with rollator at home, doesn't drive ( daughter transports), room air, has shower chair, [facility].   Type of Residence Private residence   Number of Stairs to Enter Residence 3   Number of Stairs Within Residence 0   Do you have animals or pets at home? No   Who is requesting discharge planning? Provider   Home or Post Acute Services Other (Comment)  (TBD)   Patient expects to be discharged to: Patient is observation and doesn't meet IP criteria per provider, PT/ OT recommending SNf. Discussed with patient and daughter therapy recommendations and that without IP 3 midnight stay SNF will not be covered by insurance. Discussed options of going to SNf private pay vs home with skilled HHC for SN, PT, OT. Daughter wants to discuss with her siblings and give me decision later and requesting ortho to see her mother. Currently on 2 L here and none at home.   Financial Resource Strain   How hard is it for you to pay for the very basics like food, housing, medical care, and heating? Not hard   Housing Stability   In the last 12 months, was there a time when you were not able to pay the mortgage or rent on time? N   In the last 12 months, how many places have you lived? 1   In the last 12 months, was there a time when you did not have a steady place to sleep or slept in a shelter (including now)? N   Transportation Needs   In the past 12 months, has lack of transportation kept you from medical appointments or from getting medications? no   In the past 12 months, has lack of transportation kept you from meetings, work, or from getting things needed for daily living? No   Patient Choice   Provider Choice list and CMS website (https://medicare.gov/care-compare#search) for post-acute Quality and Resource Measure Data were provided and reviewed with: Family;Patient      7/6/24 @ 1444: Patients daughter interested in SNF private pay. Referrals sent to [facility] and [facility] via [facility].  Facilities reviewing.

## 2024-07-06 NOTE — ED TRIAGE NOTES
Patient presents to the ED via EMS with c/o left hip pain that started yesterday with unknown cause.

## 2024-07-06 NOTE — CONSULTS
Reason For Consult  Left hip pain    History Of Present Illness  Andreia Camejo is a 89 y.o. female presenting with patient presented to the hospital with left hip pain that is primarily lateral sided pain that been bothering her quite a bit the night in the morning of her evaluation she had no specific fall or trauma she had previous hip arthroplasty many years ago that was uncomplicated and since that time.  Has done relatively well.  She was admitted for atraumatic hip pain and bruising on the lateral aspect of the hip she is complaining of pain at this time she denies any specific injury     Past Medical History  She has a past medical history of Acute cystitis without hematuria (05/10/2017), Allergic rhinitis due to pollen (08/18/2017), Body mass index (BMI) 28.0-28.9, adult (10/16/2018), Body mass index (BMI) 29.0-29.9, adult (05/14/2020), Cervicalgia (09/13/2013), Chronic ischemic heart disease, unspecified (08/14/2013), Chronic kidney disease, stage 4 (severe) (Multi) (11/18/2020), Flushing (08/28/2014), Hereditary and idiopathic neuropathy, unspecified (08/14/2013), Neurological symptoms (01/19/2024), Other acute sinusitis (12/15/2020), Other conditions influencing health status (03/26/2021), Other pulmonary embolism without acute cor pulmonale (Multi) (05/25/2022), Other specified disorders of eustachian tube, right ear (10/04/2019), Personal history of other diseases of the digestive system (12/02/2014), Personal history of other diseases of the musculoskeletal system and connective tissue (05/25/2022), Personal history of other diseases of urinary system (08/14/2013), Personal history of other specified conditions (06/12/2017), Personal history of other specified conditions (06/28/2013), Personal history of other specified conditions (01/04/2021), Personal history of other specified conditions (08/28/2014), Personal history of other venous thrombosis and embolism (05/25/2022), Presence of other cardiac  implants and grafts (09/12/2017), Pyuria (01/04/2021), Shingles (05/08/2023), 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    Surgical History  She has a past surgical history that includes Carpal tunnel release (05/22/2013); Other surgical history (05/22/2013); Other surgical history (05/22/2013); Other surgical history (05/22/2013); Other surgical history (09/07/2013); Other surgical history (09/18/2013); Cataract extraction (08/14/2013); Total hip arthroplasty (08/14/2013); Dilation and curettage of uterus (08/14/2013); Appendectomy (08/14/2013); Tubal ligation (08/14/2013); Other surgical history (08/14/2013); Other surgical history (08/14/2013); Cholecystectomy (08/14/2013); Colonoscopy (08/14/2013); Cystoscopy (08/14/2013); Tonsillectomy (08/14/2013); Varicose vein surgery (08/14/2013); MR angio head wo IV contrast (11/14/2016); and MR angio neck wo IV contrast (11/14/2016).     Social History  She reports that she has never smoked. She has never used smokeless tobacco. She reports that she does not drink alcohol and does not use drugs.    Family History  No family history on file.     Allergies  Influenza virus vaccine whole, Iodinated contrast media, Lisinopril, Moxifloxacin, Penicillin, Penicillins, Red dye, and Adhesive tape-silicones    Review of Systems  12 point review of systems is negative with the exception of the above-mentioned HPI     Physical Exam  Alert and orient x 3 no acute distress resting in the bed normocephalic atraumatic extraocular movements are intact mucous membranes are moist neck is supple no JVD nonlabored breathing nondistended abdomen palpable peripheral pulses left hip demonstrates ecchymosis along the lateral aspect of the hip and mild tenderness palpation over the greater trochanteric bursa there is a  "negative heel strike negative logroll no warmth no signs of infection no drainage no short arc pain brisk cap refill palpable pulses     Last Recorded Vitals  Blood pressure 113/64, pulse 65, temperature 36.2 °C (97.2 °F), temperature source Temporal, resp. rate 18, height 1.727 m (5' 8\"), weight 84.8 kg (187 lb), SpO2 93%.    Relevant Results      Scheduled medications  acetaminophen, 975 mg, oral, q8h  apixaban, 5 mg, oral, q12h  carvedilol, 3.125 mg, oral, BID  clopidogrel, 75 mg, oral, Daily  gabapentin, 300 mg, oral, BID  insulin lispro, 0-10 Units, subcutaneous, TID  lidocaine, 1 patch, transdermal, Daily  rosuvastatin, 10 mg, oral, Nightly      Continuous medications     PRN medications  PRN medications: dextrose, dextrose, glucagon, glucagon, HYDROmorphone, melatonin, oxyCODONE, oxyCODONE, polyethylene glycol, sennosides-docusate sodium  Results for orders placed or performed during the hospital encounter of 07/05/24 (from the past 24 hour(s))   CBC and Auto Differential   Result Value Ref Range    WBC 9.9 4.4 - 11.3 x10*3/uL    nRBC 0.0 0.0 - 0.0 /100 WBCs    RBC 3.82 (L) 4.00 - 5.20 x10*6/uL    Hemoglobin 12.9 12.0 - 16.0 g/dL    Hematocrit 38.8 36.0 - 46.0 %     (H) 80 - 100 fL    MCH 33.8 26.0 - 34.0 pg    MCHC 33.2 32.0 - 36.0 g/dL    RDW 12.9 11.5 - 14.5 %    Platelets 150 150 - 450 x10*3/uL    Neutrophils % 64.4 40.0 - 80.0 %    Immature Granulocytes %, Automated 0.3 0.0 - 0.9 %    Lymphocytes % 21.0 13.0 - 44.0 %    Monocytes % 12.4 2.0 - 10.0 %    Eosinophils % 1.3 0.0 - 6.0 %    Basophils % 0.6 0.0 - 2.0 %    Neutrophils Absolute 6.39 (H) 1.60 - 5.50 x10*3/uL    Immature Granulocytes Absolute, Automated 0.03 0.00 - 0.50 x10*3/uL    Lymphocytes Absolute 2.09 0.80 - 3.00 x10*3/uL    Monocytes Absolute 1.23 (H) 0.05 - 0.80 x10*3/uL    Eosinophils Absolute 0.13 0.00 - 0.40 x10*3/uL    Basophils Absolute 0.06 0.00 - 0.10 x10*3/uL   Magnesium   Result Value Ref Range    Magnesium 1.97 1.60 - 2.40 " mg/dL   Comprehensive metabolic panel   Result Value Ref Range    Glucose 144 (H) 74 - 99 mg/dL    Sodium 137 136 - 145 mmol/L    Potassium 5.6 (H) 3.5 - 5.3 mmol/L    Chloride 104 98 - 107 mmol/L    Bicarbonate 28 21 - 32 mmol/L    Anion Gap 11 10 - 20 mmol/L    Urea Nitrogen 30 (H) 6 - 23 mg/dL    Creatinine 1.29 (H) 0.50 - 1.05 mg/dL    eGFR 39 (L) >60 mL/min/1.73m*2    Calcium 8.3 (L) 8.6 - 10.3 mg/dL    Albumin 3.5 3.4 - 5.0 g/dL    Alkaline Phosphatase 48 33 - 136 U/L    Total Protein 6.5 6.4 - 8.2 g/dL    AST 28 9 - 39 U/L    Bilirubin, Total 0.3 0.0 - 1.2 mg/dL    ALT 17 7 - 45 U/L   Creatine Kinase   Result Value Ref Range    Creatine Kinase 52 0 - 215 U/L   C-reactive protein   Result Value Ref Range    C-Reactive Protein 0.19 <1.00 mg/dL   POCT GLUCOSE   Result Value Ref Range    POCT Glucose 201 (H) 74 - 99 mg/dL   Urinalysis with Reflex Culture and Microscopic   Result Value Ref Range    Color, Urine Yellow Light-Yellow, Yellow, Dark-Yellow    Appearance, Urine Turbid (N) Clear    Specific Gravity, Urine 1.019 1.005 - 1.035    pH, Urine 5.5 5.0, 5.5, 6.0, 6.5, 7.0, 7.5, 8.0    Protein, Urine 10 (TRACE) NEGATIVE, 10 (TRACE), 20 (TRACE) mg/dL    Glucose, Urine 70 (1+) (A) Normal mg/dL    Blood, Urine NEGATIVE NEGATIVE    Ketones, Urine NEGATIVE NEGATIVE mg/dL    Bilirubin, Urine NEGATIVE NEGATIVE    Urobilinogen, Urine Normal Normal mg/dL    Nitrite, Urine NEGATIVE NEGATIVE    Leukocyte Esterase, Urine 500 Jannette/µL (A) NEGATIVE   Microscopic Only, Urine   Result Value Ref Range    WBC, Urine >50 (A) 1-5, NONE /HPF    RBC, Urine 11-20 (A) NONE, 1-2, 3-5 /HPF    Squamous Epithelial Cells, Urine 1-9 (SPARSE) Reference range not established. /HPF    Bacteria, Urine 4+ (A) NONE SEEN /HPF    Mucus, Urine FEW Reference range not established. /LPF   Renal function panel   Result Value Ref Range    Glucose 340 (H) 74 - 99 mg/dL    Sodium 136 136 - 145 mmol/L    Potassium 5.1 3.5 - 5.3 mmol/L    Chloride 102 98 - 107  mmol/L    Bicarbonate 27 21 - 32 mmol/L    Anion Gap 12 10 - 20 mmol/L    Urea Nitrogen 29 (H) 6 - 23 mg/dL    Creatinine 1.21 (H) 0.50 - 1.05 mg/dL    eGFR 42 (L) >60 mL/min/1.73m*2    Calcium 8.6 8.6 - 10.3 mg/dL    Phosphorus 4.2 2.5 - 4.9 mg/dL    Albumin 3.6 3.4 - 5.0 g/dL   POCT GLUCOSE   Result Value Ref Range    POCT Glucose 295 (H) 74 - 99 mg/dL        Assessment/Plan     Assessment-left hip pain, likely muscular strain    Plan-patient CT scan does not reveal any obvious pathology no failure of her implants or signs of implant loosening.  My suspicion is she likely has possibly a small tear in the hip abductor musculature where she has some excessive bleeding and bruising from her oral anticoagulation.  From the implant standpoint I think everything looks appropriate without any signs of implant compromise or failure of would not recommend any surgical intervention at this point she can undergo PT OT with weightbearing as tolerated and if she needs some more care than what she can do at home she may need placement in skilled nursing for rehabilitation    Luis Armando Felix, DO

## 2024-07-06 NOTE — ED PROVIDER NOTES
HPI   Chief Complaint   Patient presents with    Hip Pain     Left        This is a 89-year-old female with PMH A-fib on Eliquis, HTN, CKD stage III, T2DM, polymyalgia rheumatica presenting for evaluation of atraumatic left hip pain since this morning.  Has been unable to bear weight due to pain.  Denies any trauma or injury.  Denies warmth, redness, fevers, chills, back pain, abdominal pain, chest pain, shortness of breath.  Has distant history of left total hip arthroplasty.      History provided by:  Patient   used: No                        Ailyn Coma Scale Score: 15                     Patient History   Past Medical History:   Diagnosis Date    Acute cystitis without hematuria 05/10/2017    Acute cystitis without hematuria    Allergic rhinitis due to pollen 08/18/2017    Acute seasonal allergic rhinitis due to pollen    Body mass index (BMI) 28.0-28.9, adult 10/16/2018    BMI 28.0-28.9,adult    Body mass index (BMI) 29.0-29.9, adult 05/14/2020    BMI 29.0-29.9,adult    Cervicalgia 09/13/2013    Cervicalgia    Chronic ischemic heart disease, unspecified 08/14/2013    Chronic myocardial ischemia    Chronic kidney disease, stage 4 (severe) (Multi) 11/18/2020    CKD (chronic kidney disease), stage IV    Flushing 08/28/2014    Flushing    Hereditary and idiopathic neuropathy, unspecified 08/14/2013    Hereditary and idiopathic neuropathy    Neurological symptoms 01/19/2024    Other acute sinusitis 12/15/2020    Other acute sinusitis, recurrence not specified    Other conditions influencing health status 03/26/2021    History of burning on urination    Other pulmonary embolism without acute cor pulmonale (Multi) 05/25/2022    Multiple pulmonary emboli    Other specified disorders of eustachian tube, right ear 10/04/2019    Dysfunction of right eustachian tube    Personal history of other diseases of the digestive system 12/02/2014    History of constipation    Personal history of other diseases  of the musculoskeletal system and connective tissue 05/25/2022    History of polymyalgia rheumatica    Personal history of other diseases of urinary system 08/14/2013    History of pyelonephritis    Personal history of other specified conditions 06/12/2017    History of bruising easily    Personal history of other specified conditions 06/28/2013    History of syncope    Personal history of other specified conditions 01/04/2021    History of confusion    Personal history of other specified conditions 08/28/2014    History of excessive sweating    Personal history of other venous thrombosis and embolism 05/25/2022    History of deep venous thrombosis    Presence of other cardiac implants and grafts 09/12/2017    Status post placement of implantable loop recorder    Pyuria 01/04/2021    Pyuria    Shingles 05/08/2023    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    Suspected COVID-19 virus infection 05/08/2023    Syncope and collapse 11/24/2016    Near syncope    Urinary tract infection 05/08/2023    Weakness 01/19/2024     Past Surgical History:   Procedure Laterality Date    APPENDECTOMY  08/14/2013    Appendectomy    CARPAL TUNNEL RELEASE  05/22/2013    Neuroplasty Decompression Median Nerve At Carpal Tunnel    CATARACT EXTRACTION  08/14/2013    Cataract Surgery    CHOLECYSTECTOMY  08/14/2013    Cholecystectomy    COLONOSCOPY  08/14/2013    Complete Colonoscopy    CYSTOSCOPY  08/14/2013    Diagnostic Cystoscopy    DILATION AND CURETTAGE OF UTERUS  08/14/2013    Dilation And Curettage    MR HEAD ANGIO WO IV CONTRAST  11/14/2016    MR HEAD ANGIO WO IV CONTRAST 11/14/2016 [facility]    MR NECK ANGIO WO IV CONTRAST  11/14/2016    MR NECK ANGIO WO IV CONTRAST 11/14/2016 [facility]    OTHER SURGICAL HISTORY  05/22/2013    Neuroplasty With Transposition Of Ulnar Nerve    OTHER SURGICAL HISTORY  05/22/2013    Excision Of Lesion Fingers    OTHER SURGICAL HISTORY   05/22/2013    Osteotomy For Phalangeal Deformity Of Finger    OTHER SURGICAL HISTORY  09/07/2013    Cath Placement Of Stent 3    OTHER SURGICAL HISTORY  09/18/2013    Cardiac Cath Procedure Outcome: Successful    OTHER SURGICAL HISTORY  08/14/2013    Cath Stent 1 Stenosis    OTHER SURGICAL HISTORY  08/14/2013    Cath Stent 2 Initial    TONSILLECTOMY  08/14/2013    Tonsillectomy    TOTAL HIP ARTHROPLASTY  08/14/2013    Hip Replacement    TUBAL LIGATION  08/14/2013    Tubal Ligation    VARICOSE VEIN SURGERY  08/14/2013    Varicose Vein Ligation     No family history on file.  Social History     Tobacco Use    Smoking status: Never    Smokeless tobacco: Never   Substance Use Topics    Alcohol use: Never    Drug use: Never       Physical Exam   ED Triage Vitals   Temperature Heart Rate Respirations BP   07/05/24 2020 07/05/24 2020 07/05/24 2020 07/05/24 2020   36.6 °C (97.9 °F) 81 16 136/76      Pulse Ox Temp Source Heart Rate Source Patient Position   07/05/24 2020 07/05/24 2341 -- 07/05/24 2341   (!) 93 % Temporal  Lying      BP Location FiO2 (%)     07/05/24 2341 --     Right arm        Physical Exam    Gen.: Vitals noted.  Elderly.  No distress.  Cardiac: Regular rate rhythm.  Grade 2/6 systolic murmur heard best at the LSB.  Pulmonary: Equal breath sounds bilaterally. No adventitious breath sounds.   Abdomen: Soft, nontender  Back: No midline or paraspinal TTP. No crepitance.  Lower extremity: Exam of the left hip shows TTP over the greater trochanter. No shortening or rotation.  Has some tenderness with active range of motion.  The remainder of the extremity is nontender. Skin is intact. Is neurovascularly intact distally. No erythema or warmth. No effusion.    ED Course & MDM        Medical Decision Making  DDx: Fracture, dislocation, nonvisualized/occult fracture, tendon/ligament injury, soft tissue injury    Patient has tenderness palpation of the greater trochanter.  There is no shortening or rotation.  She is  neurovascularly intact distally.  There is no evidence of an intra-articular infection.  Her vital signs are stable.  X-ray shows no acute fracture or dislocation.  She was given a dose of Norco.  Additionally given morphine and Solu-Medrol.  Reevaluation pain not controlled patient may require hospitalization for PT OT and placement.  This visit was staffed with the attending physician Dr. Caraballo.      Disclaimer: This note was dictated using speech recognition software. An attempt at proofreading was made to minimize errors. Minor errors in transcription may be present. Please call if questions.    Amount and/or Complexity of Data Reviewed  Labs: ordered.  Radiology: ordered.        Procedure  Procedures     Jerry Doty PA-C  07/06/24 0114

## 2024-07-06 NOTE — H&P
Subjective   Subjective:   HPI:  Andreia Camejo is a 89 y.o. female with a PMH of CKD IV (baseline Cr 1.01-1.49), pulmonary embolism, a. Fib (Eliquis, pacemaker), T2DM (no meds, A1c 8.1 in April 2024), degenerative disc disease, macular degeneration, CAD, and mild aortic stenosis, who presented to [facility] with c/o intractable left hip pain. The patient's pain began the morning of 7/4 and gradually worsened. She does not recall falling or bumping the area. She does not remember what she was doing when the pain started.  She took 2 tylenol pills, which did not help. She was sitting in a chair and when she attempted to get up the pain was too severe to stand, so she called her daughter and then came to the ED via ambulance. At baseline she is able to ambulate with a walker. She has not been ill recently and denies fevers or chills. She has not noticed changes to the skin around her hip and was surprised when I pointed out her bruise. She had a hip replacement in her left knee years ago and wonders if her current symptoms are related to her surgery. She states that she has some redness in her left eye, which she attributes to a stye.     12 point Review of Systems negative unless stated above    ED COURSE:  Vitals:   - /76 , HR 81 , RR 16 , SpO2 93%, T 36.6C  Labs:  - WBC 9.9, HGB 12.9,   - Na 137, Cl 104, K 5.6, bicarb 28, BUN 30, Cr 1.29  Imaging:  - XR left hip: no evidence of fracture or dislocation  - CT pelvis: no acute process  Interventions:   - Norco and morphine for pain  - Solumedrol  - Placed on O2, since weaned to RA    PMH: as per HPI.  PSH: total left hip replacement, bilateral knee replacements, cholecystectomy, appendectomy   FHX: unknown  SHX: lives alone next door to daughter, never used alcohol or tobacco  Allergies: penicillin   Medications: reviewed.    Code Status: DNR/DNI    Review of Systems   Constitutional:  Negative for appetite change, chills and fever.   Eyes:  Positive for  "redness.   Respiratory:  Negative for apnea, cough and shortness of breath.    Cardiovascular:  Negative for chest pain and leg swelling.   Gastrointestinal:  Negative for abdominal pain, constipation, diarrhea, nausea and vomiting.   Genitourinary:  Negative for dysuria.        Positive for chronic incontinence   Musculoskeletal:  Negative for back pain and joint swelling.        Severe pain in left upper leg.    Neurological:  Negative for dizziness, weakness, light-headedness and numbness.        Objective   Objective:     /68 (BP Location: Right arm, Patient Position: Lying)   Pulse 73   Temp 35.6 °C (96.1 °F) (Temporal)   Resp 14   Ht 1.727 m (5' 8\")   Wt 84.4 kg (186 lb)   SpO2 97%   BMI 28.28 kg/m²     Physical Exam  Constitutional:       Appearance: Normal appearance.   HENT:      Head: Normocephalic and atraumatic.   Eyes:      Extraocular Movements: Extraocular movements intact.   Cardiovascular:      Rate and Rhythm: Normal rate and regular rhythm.      Heart sounds: Murmur (2/6 systolic murmur in right 2nd intercostal space) heard.   Pulmonary:      Effort: Pulmonary effort is normal.      Breath sounds: Normal breath sounds. No wheezing or rhonchi.   Chest:      Chest wall: No tenderness.   Abdominal:      General: There is no distension.      Tenderness: There is abdominal tenderness (Tender to deep palpation in left upper and lower quadrant).   Musculoskeletal:         General: Tenderness (Tender to palpation at left, lateral, distal thigh) present.      Comments: Not tender over left greater trochanter.    Skin:     Findings: Bruising (~3 in x 6 in bruise at left, lateral distal thigh) present.   Neurological:      Mental Status: She is alert.       Assessment & Plan:     Andreia Camejo is a 89 y.o. female with a PMH of CKD IV (baseline Cr 1.01-1.49), pulmonary embolism, a. Fib (Eliquis, pacemaker), T2DM (no meds, A1c 8.1 in April 2024), degenerative disc disease, CAD, and mild aortic " stenosis. Admitted for PT/OT evaluation and placement for severe left leg pain.     ACUTE MEDICAL ISSUES:  # Left hip pain  # Ecchymosis of left lateral hip   # Difficulty ambulating   - Low suspicion for septic arthritis as patient is afebrile, physical exam did not reveal erythema or warmth    - Low suspicion for OA as pain is not over groin/greater trochanter but rather in the distal thigh   - XR left hip and CT pelvis did not reveal fracture, displacement, or other acute pathology  - 3x5 inch bruise on left lateral hip suspicious for trauma, although patient does not recall traumatic event (image in EPIC media tab)  PLAN  - PT/OT  - Pain regimen (tylenol scheduled, oxy 5 for moderate pain, oxy 10 for severe pain, dilaudid 0.4 for breakthrough)  - Lidocaine patch on left lateral thigh    #Hyperkalemia likely 2/2 to CKD:   - K 5.6 on admission  - Asymptomatic   PLAN  - Follow-up EKG  - Follow-up AM labs and correct as needed    CHRONIC MEDICAL ISSUES:  #A.fib - telemetry, continue Eliquis 5mg BID, continue carvedilol 3.125 BID  #CAD - continue clopidogrel 75 mg    #T2DM - sliding scale, carb controlled diet  #HLD - continue rosuvastatin 10 mg daily    #Severe degenerative disc disease - decreased home gabapentin dose from 300 mg QID to 300 mg BID because of patient's CKD  #macular degeneration - no intervention, follows with ophtho outpatient    Fluid: Replete PRN  Electrolytes: Replete PRN  Nutrition: carb controlled diet  GI ppx: none  DVT/PE ppx: home Eliquis   Abx: None  IV Lines: PIV  O2: RA    Dispo: Admitted for PT/OT and placement. Dispo plan pending PT/OT evaluation. Estimated length of stay <2 days.    Jean Carlos Cadet MD  PGY-1, Transitional Year

## 2024-07-06 NOTE — PROGRESS NOTES
Physical Therapy    Physical Therapy Evaluation    Patient Name: Andreia Camejo  MRN: [id number]  Today's Date: 7/6/2024   Time Calculation  Start Time: 0910  Stop Time: 0931  Time Calculation (min): 21 min    Assessment/Plan Pt is a 89 yo female who presents to the hospital with L hip pain. Prior to admission pt was independent with all mobility with use of rollator. Pt now demonstrates limited mobility and generalized weakness secondary to her L hip pain. Pt may benefit from PT services at this time for strengthening and mobility training to return towards prior level of function.  PT Assessment  PT Assessment Results: Decreased strength, Decreased mobility, Pain  Rehab Prognosis: Good  Evaluation/Treatment Tolerance: Patient limited by pain  Medical Staff Made Aware: Yes  Strengths: Ability to acquire knowledge  End of Session Communication: Bedside nurse, Care Coordinator, Physician  End of Session Patient Position: Bed, 3 rail up, Alarm on  IP OR SWING BED PT PLAN  Inpatient or Swing Bed: Inpatient  PT Plan  Treatment/Interventions: Bed mobility, Gait training, Transfer training, Strengthening, Therapeutic exercise, Therapeutic activity, Home exercise program  PT Plan: Ongoing PT  PT Frequency: 3 times per week  PT Discharge Recommendations: Moderate intensity level of continued care  Equipment Recommended upon Discharge: Wheeled walker  PT Recommended Transfer Status: Assist x1  PT - OK to Discharge: Yes      Subjective   General Visit Information:  General  Reason for Referral: Pt admitted with L hip pain limiting mobility  Referred By: Jean Carlos Cadet MD  Past Medical History Relevant to Rehab: PMH: CKD IV (baseline Cr 1.01-1.49), pulmonary embolism, a. Fib (Eliquis, pacemaker), T2DM (no meds, A1c 8.1 in April 2024), degenerative disc disease, macular degeneration, CAD, and mild aortic stenosis  Co-Treatment: OT  Co-Treatment Reason: To maximize pt mobility safely  Prior to Session Communication: Bedside  nurse  Patient Position Received: Bed, 3 rail up, Alarm on  Home Living:  Home Living  Type of Home: House  Lives With: Alone (Daughter lives in a home on the same property)  Home Adaptive Equipment: Cane (Rollator)  Home Layout: One level  Home Access: Stairs to enter with rails  Entrance Stairs-Number of Steps: 3  Bathroom Shower/Tub: Walk-in shower  Bathroom Toilet: Adaptive toilet seating  Bathroom Equipment: Grab bars in shower  Prior Level of Function:  Prior Function Per Pt/Caregiver Report  Level of Turner: Independent with ADLs and functional transfers, Independent with homemaking with ambulation  ADL Assistance:  (Assist required with bathing)  Precautions:  Precautions  Medical Precautions: Fall precautions       Objective   Pain:  Pain Assessment  Pain Assessment:  (4/10 pain in L hip with sitting EOB and standing)  Cognition:  Cognition  Overall Cognitive Status: Within Functional Limits    General Assessments:  General Observation  General Observation: 1L O2, purewick catheter, telemetry          Static Sitting Balance  Static Sitting-Balance Support: Bilateral upper extremity supported, Feet supported (R lateral leaning to offload L hip due to increased L hip pain with WBing)       Functional Assessments:  Bed Mobility  Bed Mobility: Yes  Bed Mobility 1  Bed Mobility 1: Supine to sitting  Level of Assistance 1: Close supervision  Bed Mobility 2  Bed Mobility  2: Sitting to supine  Level of Assistance 2: Minimum assistance    Transfers  Transfer: Yes  Transfer 1  Transfer From 1: Bed to, Sit to  Transfer to 1: Stand  Technique 1: Sit to stand  Transfer Device 1: Walker  Transfer Level of Assistance 1: Contact guard  Transfers 2  Transfer From 2: Stand to  Transfer to 2: Sit, Bed  Technique 2: Stand to sit  Transfer Device 2: Walker  Transfer Level of Assistance 2: Contact guard    Ambulation/Gait Training  Ambulation/Gait Training Performed: Yes  Ambulation/Gait Training 1  Surface 1: Level  tile  Device 1: Rolling walker  Assistance 1: Contact guard  Quality of Gait 1:  (Decreased WBing LLE)  Comments/Distance (ft) 1: 5'  Extremity/Trunk Assessments:  LLE   LLE :  (Lateral left hip bruising)  Outcome Measures:  [facility] Basic Mobility  Turning from your back to your side while in a flat bed without using bedrails: None  Moving from lying on your back to sitting on the side of a flat bed without using bedrails: A little  Moving to and from bed to chair (including a wheelchair): A little  Standing up from a chair using your arms (e.g. wheelchair or bedside chair): A little  To walk in hospital room: A lot  Climbing 3-5 steps with railing: A lot  Basic Mobility - Total Score: 17    Encounter Problems       Encounter Problems (Active)       Balance       STG - Maintains static sitting balance with upper extremity support equal Wbing through LE's       Start:  07/06/24    Expected End:  07/20/24               Mobility       STG - Patient will ambulate at least 25' with FWW, close supervision       Start:  07/06/24    Expected End:  07/20/24               PT Transfers       STG - Patient to transfer to and from sit to supine independently       Start:  07/06/24    Expected End:  07/20/24            STG - Patient will transfer sit to and from stand with FWW, close supervision       Start:  07/06/24    Expected End:  07/20/24               Pain - Adult              Education Documentation  Body Mechanics, taught by Myra Mauricio PT at 7/6/2024 11:22 AM.  Learner: Patient  Readiness: Acceptance  Method: Explanation  Response: Demonstrated Understanding    Mobility Training, taught by Myra Mauricio PT at 7/6/2024 11:22 AM.  Learner: Patient  Readiness: Acceptance  Method: Explanation  Response: Demonstrated Understanding    Education Comments  No comments found.

## 2024-07-06 NOTE — HOSPITAL COURSE
Andreia Camejo was admitted 7/5/24 for intractable hip pain with small hip abductor tear and decreased mobility. Xray hip showed no acute fracture, CT pelvis showed no acute processes or hardware malfunction. No fever, leukocytosis. Hemodynamically stable. Ortho was consulted and did not recommend any acute interventions at this time. She was seen by PT/OT which recommended SNF placement but patient preferred to go home. She required some o2 upon admission but was weaned to RA once mobile. Ct chest was unremarkable. Patient was ambulating prior to dc.     Patient was dc home with [facility]. She was dc on home meds. Referrals to C was placed. She was instructed to follow up with PCP 1-2 days after dc. She was understanding of plan and agreed prior to dc.

## 2024-07-07 ENCOUNTER — HOME HEALTH ADMISSION (OUTPATIENT)
Dept: HOME HEALTH SERVICES | Facility: HOME HEALTH | Age: 89
End: 2024-07-07
Payer: MEDICARE

## 2024-07-07 ENCOUNTER — APPOINTMENT (OUTPATIENT)
Dept: RADIOLOGY | Facility: HOSPITAL | Age: 89
End: 2024-07-07
Payer: MEDICARE

## 2024-07-07 VITALS
BODY MASS INDEX: 28.34 KG/M2 | HEIGHT: 68 IN | DIASTOLIC BLOOD PRESSURE: 64 MMHG | WEIGHT: 187 LBS | SYSTOLIC BLOOD PRESSURE: 104 MMHG | OXYGEN SATURATION: 92 % | RESPIRATION RATE: 16 BRPM | HEART RATE: 63 BPM | TEMPERATURE: 97.2 F

## 2024-07-07 LAB
ALBUMIN SERPL BCP-MCNC: 3.2 G/DL (ref 3.4–5)
ALP SERPL-CCNC: 60 U/L (ref 33–136)
ALT SERPL W P-5'-P-CCNC: 77 U/L (ref 7–45)
ANION GAP SERPL CALC-SCNC: 11 MMOL/L (ref 10–20)
AST SERPL W P-5'-P-CCNC: 55 U/L (ref 9–39)
BACTERIA UR CULT: ABNORMAL
BILIRUB SERPL-MCNC: 0.5 MG/DL (ref 0–1.2)
BUN SERPL-MCNC: 39 MG/DL (ref 6–23)
CALCIUM SERPL-MCNC: 8.5 MG/DL (ref 8.6–10.3)
CHLORIDE SERPL-SCNC: 104 MMOL/L (ref 98–107)
CO2 SERPL-SCNC: 29 MMOL/L (ref 21–32)
CREAT SERPL-MCNC: 1.22 MG/DL (ref 0.5–1.05)
EGFRCR SERPLBLD CKD-EPI 2021: 41 ML/MIN/1.73M*2
ERYTHROCYTE [DISTWIDTH] IN BLOOD BY AUTOMATED COUNT: 12.7 % (ref 11.5–14.5)
GLUCOSE BLD MANUAL STRIP-MCNC: 217 MG/DL (ref 74–99)
GLUCOSE BLD MANUAL STRIP-MCNC: 253 MG/DL (ref 74–99)
GLUCOSE SERPL-MCNC: 222 MG/DL (ref 74–99)
HCT VFR BLD AUTO: 36.1 % (ref 36–46)
HGB BLD-MCNC: 11.7 G/DL (ref 12–16)
MAGNESIUM SERPL-MCNC: 1.9 MG/DL (ref 1.6–2.4)
MCH RBC QN AUTO: 33.1 PG (ref 26–34)
MCHC RBC AUTO-ENTMCNC: 32.4 G/DL (ref 32–36)
MCV RBC AUTO: 102 FL (ref 80–100)
NRBC BLD-RTO: 0 /100 WBCS (ref 0–0)
PHOSPHATE SERPL-MCNC: 3.8 MG/DL (ref 2.5–4.9)
PLATELET # BLD AUTO: 162 X10*3/UL (ref 150–450)
POTASSIUM SERPL-SCNC: 5.1 MMOL/L (ref 3.5–5.3)
PROT SERPL-MCNC: 6 G/DL (ref 6.4–8.2)
RBC # BLD AUTO: 3.53 X10*6/UL (ref 4–5.2)
SODIUM SERPL-SCNC: 139 MMOL/L (ref 136–145)
WBC # BLD AUTO: 13.4 X10*3/UL (ref 4.4–11.3)

## 2024-07-07 PROCEDURE — 36415 COLL VENOUS BLD VENIPUNCTURE: CPT

## 2024-07-07 PROCEDURE — 2500000001 HC RX 250 WO HCPCS SELF ADMINISTERED DRUGS (ALT 637 FOR MEDICARE OP)

## 2024-07-07 PROCEDURE — G0378 HOSPITAL OBSERVATION PER HR: HCPCS

## 2024-07-07 PROCEDURE — 82947 ASSAY GLUCOSE BLOOD QUANT: CPT

## 2024-07-07 PROCEDURE — 71250 CT THORAX DX C-: CPT | Performed by: RADIOLOGY

## 2024-07-07 PROCEDURE — 85027 COMPLETE CBC AUTOMATED: CPT

## 2024-07-07 PROCEDURE — 94760 N-INVAS EAR/PLS OXIMETRY 1: CPT

## 2024-07-07 PROCEDURE — 2500000005 HC RX 250 GENERAL PHARMACY W/O HCPCS

## 2024-07-07 PROCEDURE — 80053 COMPREHEN METABOLIC PANEL: CPT

## 2024-07-07 PROCEDURE — 99239 HOSP IP/OBS DSCHRG MGMT >30: CPT

## 2024-07-07 PROCEDURE — 71250 CT THORAX DX C-: CPT

## 2024-07-07 PROCEDURE — 84100 ASSAY OF PHOSPHORUS: CPT

## 2024-07-07 PROCEDURE — 83735 ASSAY OF MAGNESIUM: CPT

## 2024-07-07 RX ADMIN — APIXABAN 5 MG: 5 TABLET, FILM COATED ORAL at 09:27

## 2024-07-07 RX ADMIN — INSULIN LISPRO 6 UNITS: 100 INJECTION, SOLUTION INTRAVENOUS; SUBCUTANEOUS at 13:02

## 2024-07-07 RX ADMIN — CLOPIDOGREL 75 MG: 75 TABLET ORAL at 09:27

## 2024-07-07 RX ADMIN — LIDOCAINE 4% 1 PATCH: 40 PATCH TOPICAL at 09:27

## 2024-07-07 RX ADMIN — GABAPENTIN 300 MG: 300 CAPSULE ORAL at 09:27

## 2024-07-07 RX ADMIN — CARVEDILOL 3.12 MG: 3.12 TABLET, FILM COATED ORAL at 09:27

## 2024-07-07 RX ADMIN — INSULIN LISPRO 4 UNITS: 100 INJECTION, SOLUTION INTRAVENOUS; SUBCUTANEOUS at 09:27

## 2024-07-07 RX ADMIN — ACETAMINOPHEN 975 MG: 325 TABLET ORAL at 09:27

## 2024-07-07 ASSESSMENT — COGNITIVE AND FUNCTIONAL STATUS - GENERAL
MOVING TO AND FROM BED TO CHAIR: A LITTLE
STANDING UP FROM CHAIR USING ARMS: A LITTLE
WALKING IN HOSPITAL ROOM: A LOT
TURNING FROM BACK TO SIDE WHILE IN FLAT BAD: A LITTLE
DRESSING REGULAR UPPER BODY CLOTHING: A LITTLE
MOBILITY SCORE: 17
CLIMB 3 TO 5 STEPS WITH RAILING: A LOT
TOILETING: A LOT
DRESSING REGULAR LOWER BODY CLOTHING: A LOT
HELP NEEDED FOR BATHING: A LOT
DAILY ACTIVITIY SCORE: 17

## 2024-07-07 ASSESSMENT — PAIN - FUNCTIONAL ASSESSMENT: PAIN_FUNCTIONAL_ASSESSMENT: 0-10

## 2024-07-07 ASSESSMENT — PAIN SCALES - GENERAL: PAINLEVEL_OUTOF10: 0 - NO PAIN

## 2024-07-07 NOTE — PROGRESS NOTES
07/07/24 1204   Discharge Planning   Patient expects to be discharged to: Patient improved significantly overnight, per patient and nursing able to ambulate in room, no pain in hip, patient wants to return home with HHC. Discussed with daughter via phone who is in agreement. Choices provided and selected [facility] for PT, OT. Request made to provider to place internal referral for HHC. PCP is Dr. MI Fall. Home 02 eval ordered and pending.   Does the patient need discharge transport arranged? No   Patient Choice   Provider Choice list and CMS website (https://medicare.gov/care-compare#search) for post-acute Quality and Resource Measure Data were provided and reviewed with: Family;Patient     7/7/24 @ 1558: Patient had home 02 eval, does not need home 02 on discharge. [facility] reports patient address outside service area, referral changed to external referral and referrals sent to alternative C agencies. [facility] willing to follow. Final HCO and referral sent to [facility] via [facility].

## 2024-07-07 NOTE — DISCHARGE INSTRUCTIONS
You have mild tear in the hip muscles. You are now stable enough to be discharged home.    The following recommendations are made for you at time of hospital discharge:  - Please take your home medications as instructed prior to hospitalization.   - Please follow-up with your primary care provider within 5-7 days from time of discharge. Likely will need to bring photo ID and insurance card to your appointment.   - [facility] services has been requested to make an appointment for you however if you do not hear back from them within 2-3 days, please call [phone] to find out about appointments with [facility] services.  - If you experience any worsening symptoms or any new acute concerns arise, please contact your primary care provider to discuss and possibly arrange an appointment. If you cannot get in touch with provider or severe symptoms are present, please return to nearest emergency room/urgent care for evaluation and treatment.

## 2024-07-07 NOTE — DISCHARGE SUMMARY
Discharge Diagnosis  Left hip pain  Left hip muscle strain possible muscle tear    Issues Requiring Follow-Up  Followup with orthopaedic surgery as outpatient for further evaluation  Followup with your primary care physician otherwise for chronic management of issues.  Work with therapy services to improve your physical conditioning/ambulatory status/strength    Discharge Meds     Your medication list        CONTINUE taking these medications        Instructions Last Dose Given Next Dose Due   acetaminophen 500 mg tablet  Commonly known as: Tylenol           carvedilol 3.125 mg tablet  Commonly known as: Coreg      TAKE 1 TABLET TWICE DAILY WITH MEALS       cholecalciferol 50 MCG (2000 UT) tablet  Commonly known as: Vitamin D-3           clopidogrel 75 mg tablet  Commonly known as: Plavix      TAKE 1 TABLET ONE TIME DAILY       Eliquis 5 mg tablet  Generic drug: apixaban      TAKE 1 TABLET EVERY 12 HOURS       gabapentin 300 mg capsule  Commonly known as: Neurontin      TAKE 1 CAPSULE EVERY 6 HOURS       nitroglycerin 0.4 mg SL tablet  Commonly known as: Nitrostat      PLACE 1 TABLET (0.4 MG) UNDER THE TONGUE EVERY 5 MINUTES AS NEEDED FOR CHEST PAIN       nystatin cream  Commonly known as: Mycostatin           rosuvastatin 10 mg tablet  Commonly known as: Crestor      TAKE 1 TABLET AT BEDTIME                Test Results Pending At Discharge  Pending Labs       Order Current Status    Urine Culture Preliminary result            Hospital Course  Andreia Camejo was admitted 7/5/24 for intractable hip pain with small hip abductor tear and decreased mobility. Xray hip showed no acute fracture, CT pelvis showed no acute processes or hardware malfunction. No fever, leukocytosis. Hemodynamically stable. Ortho was consulted and did not recommend any acute interventions at this time. Most likely muscle strain possibly a small muscle tear at location of ecchymosis on left upper leg. She was seen by PT/OT which recommended SNF  placement but patient preferred to go home and her ambulatory capabilities and strength did notably improve by 7/7/24. She required some o2 upon admission most likely relating to atelectasis but was weaned to RA once mobile. Ct chest was unremarkable. Patient was ambulating prior to dc. No oxygen was required at time of discharge even with exertion when tested.     Patient was dc home with [facility]. She was dc on home meds. Referrals to [facility] was placed. She was instructed to follow up with PCP 1-2 days after dc as well as orthopaedic surgery referral.  She was understanding of plan and agreed prior to dc.     Pertinent Physical Exam At Time of Discharge  Physical Exam  Vitals reviewed.   Constitutional:       Appearance: Normal appearance.   Cardiovascular:      Rate and Rhythm: Normal rate and regular rhythm.      Pulses: Normal pulses.      Heart sounds: Normal heart sounds.   Pulmonary:      Effort: Pulmonary effort is normal.      Breath sounds: Normal breath sounds.   Abdominal:      General: Abdomen is flat. Bowel sounds are normal.      Palpations: Abdomen is soft.   Neurological:      Mental Status: She is alert.         Outpatient Follow-Up  Future Appointments   Date Time Provider Department Center   7/16/2024  2:00 PM Jocelyne Belcher MD [id number]         Darrell Renee DO

## 2024-07-08 ENCOUNTER — TELEPHONE (OUTPATIENT)
Dept: INTERNAL MEDICINE | Facility: HOSPITAL | Age: 89
End: 2024-07-08
Payer: MEDICARE

## 2024-07-08 LAB
ATRIAL RATE: 60 BPM
BACTERIA UR CULT: ABNORMAL
P AXIS: -14 DEGREES
PR INTERVAL: 334 MS
Q ONSET: 213 MS
QRS COUNT: 10 BEATS
QRS DURATION: 110 MS
QT INTERVAL: 446 MS
QTC CALCULATION(BAZETT): 446 MS
QTC FREDERICIA: 446 MS
R AXIS: -27 DEGREES
T AXIS: -17 DEGREES
T OFFSET: 436 MS
VENTRICULAR RATE: 60 BPM

## 2024-07-08 NOTE — TELEPHONE ENCOUNTER
Patient discharged from hospital yesterday.    Patient called today (7/8) to checkup on her following discharge from the hospital.  She answered phone. She is doing well. No complaints or concerns. Ambulating on own taking it slow and steady. No significant pains reported.   Aware to stay hydrated, nourished and get plenty of rest and work with therapy services on outpatient basis.  Aware to followup with PCP and orthopaedic surgery as recommended.  No questions or concerns to address from her perspective.   Wished her well and to take care.     Arias Lyman DO  Hospitalist, [facility]

## 2024-07-09 ENCOUNTER — TELEPHONE (OUTPATIENT)
Dept: PRIMARY CARE | Facility: CLINIC | Age: 89
End: 2024-07-09
Payer: MEDICARE

## 2024-07-09 ENCOUNTER — PATIENT OUTREACH (OUTPATIENT)
Dept: PRIMARY CARE | Facility: CLINIC | Age: 89
End: 2024-07-09
Payer: MEDICARE

## 2024-07-09 DIAGNOSIS — N30.00 ACUTE CYSTITIS WITHOUT HEMATURIA: Primary | ICD-10-CM

## 2024-07-09 RX ORDER — SULFAMETHOXAZOLE AND TRIMETHOPRIM 800; 160 MG/1; MG/1
1 TABLET ORAL 2 TIMES DAILY
Qty: 20 TABLET | Refills: 0 | Status: SHIPPED | OUTPATIENT
Start: 2024-07-09 | End: 2024-07-19

## 2024-07-09 NOTE — TELEPHONE ENCOUNTER
----- Message from Amy Moreira MA sent at 7/9/2024  3:56 PM EDT -----  Regarding: FW: urine  No one has treated it. She uses [facility]  ----- Message -----  From: Jocelyne Belcher MD  Sent: 7/9/2024   3:49 PM EDT  [facility]  Subject: urine                                            Call pt - and maybe DTR Zoe if she wants you to -   When she was in the hospital they did a urine culture that came back positive  - did anyone call her on that?  Did she get an antibiotic??

## 2024-07-11 ENCOUNTER — HOSPITAL ENCOUNTER (OUTPATIENT)
Facility: HOSPITAL | Age: 89
Setting detail: OBSERVATION
Discharge: NURSING, REHAB OR LONG-TERM CARE | End: 2024-07-13
Attending: STUDENT IN AN ORGANIZED HEALTH CARE EDUCATION/TRAINING PROGRAM | Admitting: INTERNAL MEDICINE
Payer: MEDICARE

## 2024-07-11 ENCOUNTER — TELEPHONE (OUTPATIENT)
Dept: PRIMARY CARE | Facility: CLINIC | Age: 89
End: 2024-07-11
Payer: MEDICARE

## 2024-07-11 ENCOUNTER — APPOINTMENT (OUTPATIENT)
Dept: RADIOLOGY | Facility: HOSPITAL | Age: 89
End: 2024-07-11
Payer: MEDICARE

## 2024-07-11 DIAGNOSIS — R26.89 UNABLE TO BEAR WEIGHT ON LEFT LOWER EXTREMITY: ICD-10-CM

## 2024-07-11 DIAGNOSIS — M25.552 LEFT HIP PAIN: Primary | ICD-10-CM

## 2024-07-11 DIAGNOSIS — M25.552 ACUTE HIP PAIN, LEFT: ICD-10-CM

## 2024-07-11 LAB
ALBUMIN SERPL BCP-MCNC: 3.6 G/DL (ref 3.4–5)
ALP SERPL-CCNC: 61 U/L (ref 33–136)
ALT SERPL W P-5'-P-CCNC: 29 U/L (ref 7–45)
ANION GAP SERPL CALC-SCNC: 12 MMOL/L (ref 10–20)
AST SERPL W P-5'-P-CCNC: 20 U/L (ref 9–39)
BASOPHILS # BLD AUTO: 0.04 X10*3/UL (ref 0–0.1)
BASOPHILS NFR BLD AUTO: 0.5 %
BILIRUB SERPL-MCNC: 0.6 MG/DL (ref 0–1.2)
BUN SERPL-MCNC: 27 MG/DL (ref 6–23)
CALCIUM SERPL-MCNC: 8.8 MG/DL (ref 8.6–10.3)
CHLORIDE SERPL-SCNC: 102 MMOL/L (ref 98–107)
CK SERPL-CCNC: 35 U/L (ref 0–215)
CO2 SERPL-SCNC: 29 MMOL/L (ref 21–32)
CREAT SERPL-MCNC: 1.29 MG/DL (ref 0.5–1.05)
CRP SERPL-MCNC: 0.4 MG/DL
EGFRCR SERPLBLD CKD-EPI 2021: 39 ML/MIN/1.73M*2
EOSINOPHIL # BLD AUTO: 0.13 X10*3/UL (ref 0–0.4)
EOSINOPHIL NFR BLD AUTO: 1.7 %
ERYTHROCYTE [DISTWIDTH] IN BLOOD BY AUTOMATED COUNT: 12.8 % (ref 11.5–14.5)
GLUCOSE BLD MANUAL STRIP-MCNC: 115 MG/DL (ref 74–99)
GLUCOSE BLD MANUAL STRIP-MCNC: 276 MG/DL (ref 74–99)
GLUCOSE SERPL-MCNC: 124 MG/DL (ref 74–99)
HCT VFR BLD AUTO: 40.1 % (ref 36–46)
HGB BLD-MCNC: 13.2 G/DL (ref 12–16)
IMM GRANULOCYTES # BLD AUTO: 0.03 X10*3/UL (ref 0–0.5)
IMM GRANULOCYTES NFR BLD AUTO: 0.4 % (ref 0–0.9)
LYMPHOCYTES # BLD AUTO: 2.01 X10*3/UL (ref 0.8–3)
LYMPHOCYTES NFR BLD AUTO: 25.8 %
MAGNESIUM SERPL-MCNC: 1.98 MG/DL (ref 1.6–2.4)
MCH RBC QN AUTO: 33.9 PG (ref 26–34)
MCHC RBC AUTO-ENTMCNC: 32.9 G/DL (ref 32–36)
MCV RBC AUTO: 103 FL (ref 80–100)
MONOCYTES # BLD AUTO: 0.93 X10*3/UL (ref 0.05–0.8)
MONOCYTES NFR BLD AUTO: 11.9 %
NEUTROPHILS # BLD AUTO: 4.65 X10*3/UL (ref 1.6–5.5)
NEUTROPHILS NFR BLD AUTO: 59.7 %
NRBC BLD-RTO: 0 /100 WBCS (ref 0–0)
PLATELET # BLD AUTO: 173 X10*3/UL (ref 150–450)
POTASSIUM SERPL-SCNC: 4.7 MMOL/L (ref 3.5–5.3)
PROT SERPL-MCNC: 6.6 G/DL (ref 6.4–8.2)
RBC # BLD AUTO: 3.89 X10*6/UL (ref 4–5.2)
SODIUM SERPL-SCNC: 138 MMOL/L (ref 136–145)
WBC # BLD AUTO: 7.8 X10*3/UL (ref 4.4–11.3)

## 2024-07-11 PROCEDURE — 2500000002 HC RX 250 W HCPCS SELF ADMINISTERED DRUGS (ALT 637 FOR MEDICARE OP, ALT 636 FOR OP/ED)

## 2024-07-11 PROCEDURE — 85025 COMPLETE CBC W/AUTO DIFF WBC: CPT | Performed by: HEALTH CARE PROVIDER

## 2024-07-11 PROCEDURE — 96374 THER/PROPH/DIAG INJ IV PUSH: CPT

## 2024-07-11 PROCEDURE — 36415 COLL VENOUS BLD VENIPUNCTURE: CPT | Performed by: HEALTH CARE PROVIDER

## 2024-07-11 PROCEDURE — 73700 CT LOWER EXTREMITY W/O DYE: CPT | Mod: LT

## 2024-07-11 PROCEDURE — 72131 CT LUMBAR SPINE W/O DYE: CPT | Performed by: STUDENT IN AN ORGANIZED HEALTH CARE EDUCATION/TRAINING PROGRAM

## 2024-07-11 PROCEDURE — 82550 ASSAY OF CK (CPK): CPT

## 2024-07-11 PROCEDURE — 2500000005 HC RX 250 GENERAL PHARMACY W/O HCPCS

## 2024-07-11 PROCEDURE — 94760 N-INVAS EAR/PLS OXIMETRY 1: CPT

## 2024-07-11 PROCEDURE — G0378 HOSPITAL OBSERVATION PER HR: HCPCS

## 2024-07-11 PROCEDURE — 99285 EMERGENCY DEPT VISIT HI MDM: CPT | Mod: 25

## 2024-07-11 PROCEDURE — 2500000004 HC RX 250 GENERAL PHARMACY W/ HCPCS (ALT 636 FOR OP/ED): Performed by: HEALTH CARE PROVIDER

## 2024-07-11 PROCEDURE — 86140 C-REACTIVE PROTEIN: CPT

## 2024-07-11 PROCEDURE — 73700 CT LOWER EXTREMITY W/O DYE: CPT | Mod: LEFT SIDE | Performed by: RADIOLOGY

## 2024-07-11 PROCEDURE — 72131 CT LUMBAR SPINE W/O DYE: CPT

## 2024-07-11 PROCEDURE — 82947 ASSAY GLUCOSE BLOOD QUANT: CPT | Mod: 59,91

## 2024-07-11 PROCEDURE — 2500000001 HC RX 250 WO HCPCS SELF ADMINISTERED DRUGS (ALT 637 FOR MEDICARE OP)

## 2024-07-11 PROCEDURE — 83735 ASSAY OF MAGNESIUM: CPT | Performed by: HEALTH CARE PROVIDER

## 2024-07-11 PROCEDURE — 80053 COMPREHEN METABOLIC PANEL: CPT | Performed by: HEALTH CARE PROVIDER

## 2024-07-11 RX ORDER — INSULIN LISPRO 100 [IU]/ML
0-10 INJECTION, SOLUTION INTRAVENOUS; SUBCUTANEOUS
Status: DISCONTINUED | OUTPATIENT
Start: 2024-07-11 | End: 2024-07-11

## 2024-07-11 RX ORDER — INSULIN LISPRO 100 [IU]/ML
0-5 INJECTION, SOLUTION INTRAVENOUS; SUBCUTANEOUS
Status: DISCONTINUED | OUTPATIENT
Start: 2024-07-12 | End: 2024-07-11

## 2024-07-11 RX ORDER — GABAPENTIN 300 MG/1
300 CAPSULE ORAL EVERY 8 HOURS
Status: DISCONTINUED | OUTPATIENT
Start: 2024-07-11 | End: 2024-07-13 | Stop reason: HOSPADM

## 2024-07-11 RX ORDER — CHOLECALCIFEROL (VITAMIN D3) 25 MCG
2000 TABLET ORAL DAILY
Status: DISCONTINUED | OUTPATIENT
Start: 2024-07-12 | End: 2024-07-13 | Stop reason: HOSPADM

## 2024-07-11 RX ORDER — DEXTROSE 50 % IN WATER (D50W) INTRAVENOUS SYRINGE
25
Status: DISCONTINUED | OUTPATIENT
Start: 2024-07-11 | End: 2024-07-13 | Stop reason: HOSPADM

## 2024-07-11 RX ORDER — DEXTROSE 50 % IN WATER (D50W) INTRAVENOUS SYRINGE
12.5
Status: DISCONTINUED | OUTPATIENT
Start: 2024-07-11 | End: 2024-07-13 | Stop reason: HOSPADM

## 2024-07-11 RX ORDER — KETOROLAC TROMETHAMINE 15 MG/ML
15 INJECTION, SOLUTION INTRAMUSCULAR; INTRAVENOUS ONCE
Status: COMPLETED | OUTPATIENT
Start: 2024-07-11 | End: 2024-07-11

## 2024-07-11 RX ORDER — ACETAMINOPHEN 325 MG/1
975 TABLET ORAL EVERY 6 HOURS
Status: DISCONTINUED | OUTPATIENT
Start: 2024-07-11 | End: 2024-07-13 | Stop reason: HOSPADM

## 2024-07-11 RX ORDER — ROSUVASTATIN CALCIUM 10 MG/1
10 TABLET, COATED ORAL NIGHTLY
Status: DISCONTINUED | OUTPATIENT
Start: 2024-07-11 | End: 2024-07-13 | Stop reason: HOSPADM

## 2024-07-11 RX ORDER — NITROGLYCERIN 0.4 MG/1
0.4 TABLET SUBLINGUAL EVERY 5 MIN PRN
Status: DISCONTINUED | OUTPATIENT
Start: 2024-07-11 | End: 2024-07-13 | Stop reason: HOSPADM

## 2024-07-11 RX ORDER — CLOPIDOGREL BISULFATE 75 MG/1
75 TABLET ORAL DAILY
Status: DISCONTINUED | OUTPATIENT
Start: 2024-07-12 | End: 2024-07-13 | Stop reason: HOSPADM

## 2024-07-11 RX ORDER — CARVEDILOL 3.12 MG/1
3.12 TABLET ORAL
Status: DISCONTINUED | OUTPATIENT
Start: 2024-07-11 | End: 2024-07-13 | Stop reason: HOSPADM

## 2024-07-11 RX ORDER — LIDOCAINE 560 MG/1
1 PATCH PERCUTANEOUS; TOPICAL; TRANSDERMAL DAILY
Status: DISCONTINUED | OUTPATIENT
Start: 2024-07-12 | End: 2024-07-13 | Stop reason: HOSPADM

## 2024-07-11 RX ORDER — INSULIN LISPRO 100 [IU]/ML
0-5 INJECTION, SOLUTION INTRAVENOUS; SUBCUTANEOUS
Status: DISCONTINUED | OUTPATIENT
Start: 2024-07-11 | End: 2024-07-12

## 2024-07-11 SDOH — SOCIAL STABILITY: SOCIAL INSECURITY: HAVE YOU HAD ANY THOUGHTS OF HARMING ANYONE ELSE?: NO

## 2024-07-11 SDOH — SOCIAL STABILITY: SOCIAL INSECURITY: HAVE YOU HAD THOUGHTS OF HARMING ANYONE ELSE?: NO

## 2024-07-11 SDOH — SOCIAL STABILITY: SOCIAL INSECURITY: WERE YOU ABLE TO COMPLETE ALL THE BEHAVIORAL HEALTH SCREENINGS?: YES

## 2024-07-11 SDOH — SOCIAL STABILITY: SOCIAL INSECURITY: DO YOU FEEL UNSAFE GOING BACK TO THE PLACE WHERE YOU ARE LIVING?: NO

## 2024-07-11 SDOH — SOCIAL STABILITY: SOCIAL INSECURITY: ARE YOU OR HAVE YOU BEEN THREATENED OR ABUSED PHYSICALLY, EMOTIONALLY, OR SEXUALLY BY ANYONE?: NO

## 2024-07-11 SDOH — SOCIAL STABILITY: SOCIAL INSECURITY: HAS ANYONE EVER THREATENED TO HURT YOUR FAMILY OR YOUR PETS?: NO

## 2024-07-11 SDOH — SOCIAL STABILITY: SOCIAL INSECURITY: DO YOU FEEL ANYONE HAS EXPLOITED OR TAKEN ADVANTAGE OF YOU FINANCIALLY OR OF YOUR PERSONAL PROPERTY?: NO

## 2024-07-11 SDOH — SOCIAL STABILITY: SOCIAL INSECURITY: ABUSE: ADULT

## 2024-07-11 SDOH — SOCIAL STABILITY: SOCIAL INSECURITY: ARE THERE ANY APPARENT SIGNS OF INJURIES/BEHAVIORS THAT COULD BE RELATED TO ABUSE/NEGLECT?: NO

## 2024-07-11 SDOH — SOCIAL STABILITY: SOCIAL INSECURITY: DOES ANYONE TRY TO KEEP YOU FROM HAVING/CONTACTING OTHER FRIENDS OR DOING THINGS OUTSIDE YOUR HOME?: NO

## 2024-07-11 ASSESSMENT — COGNITIVE AND FUNCTIONAL STATUS - GENERAL
TURNING FROM BACK TO SIDE WHILE IN FLAT BAD: A LITTLE
HELP NEEDED FOR BATHING: A LITTLE
TURNING FROM BACK TO SIDE WHILE IN FLAT BAD: A LITTLE
HELP NEEDED FOR BATHING: A LITTLE
MOVING FROM LYING ON BACK TO SITTING ON SIDE OF FLAT BED WITH BEDRAILS: A LITTLE
CLIMB 3 TO 5 STEPS WITH RAILING: A LITTLE
MOBILITY SCORE: 21
MOVING FROM LYING ON BACK TO SITTING ON SIDE OF FLAT BED WITH BEDRAILS: A LITTLE
DRESSING REGULAR LOWER BODY CLOTHING: A LITTLE
DAILY ACTIVITIY SCORE: 23
MOBILITY SCORE: 19
WALKING IN HOSPITAL ROOM: A LITTLE
PATIENT BASELINE BEDBOUND: NO
TOILETING: A LITTLE
DAILY ACTIVITIY SCORE: 21
MOVING TO AND FROM BED TO CHAIR: A LITTLE
MOVING TO AND FROM BED TO CHAIR: A LITTLE

## 2024-07-11 ASSESSMENT — PAIN DESCRIPTION - ORIENTATION: ORIENTATION: LEFT

## 2024-07-11 ASSESSMENT — ACTIVITIES OF DAILY LIVING (ADL)
ADEQUATE_TO_COMPLETE_ADL: YES
TOILETING: INDEPENDENT
PATIENT'S MEMORY ADEQUATE TO SAFELY COMPLETE DAILY ACTIVITIES?: YES
HEARING - RIGHT EAR: FUNCTIONAL
GROOMING: INDEPENDENT
LACK_OF_TRANSPORTATION: NO
JUDGMENT_ADEQUATE_SAFELY_COMPLETE_DAILY_ACTIVITIES: YES
ASSISTIVE_DEVICE: CANE;WALKER
BATHING: NEEDS ASSISTANCE
WALKS IN HOME: INDEPENDENT
FEEDING YOURSELF: INDEPENDENT
DRESSING YOURSELF: INDEPENDENT
HEARING - LEFT EAR: FUNCTIONAL

## 2024-07-11 ASSESSMENT — PAIN DESCRIPTION - LOCATION: LOCATION: HIP

## 2024-07-11 ASSESSMENT — PAIN - FUNCTIONAL ASSESSMENT
PAIN_FUNCTIONAL_ASSESSMENT: 0-10
PAIN_FUNCTIONAL_ASSESSMENT: 0-10

## 2024-07-11 ASSESSMENT — LIFESTYLE VARIABLES
HOW MANY STANDARD DRINKS CONTAINING ALCOHOL DO YOU HAVE ON A TYPICAL DAY: PATIENT DOES NOT DRINK
AUDIT-C TOTAL SCORE: 0
SKIP TO QUESTIONS 9-10: 1
HOW OFTEN DO YOU HAVE A DRINK CONTAINING ALCOHOL: NEVER
HOW OFTEN DO YOU HAVE 6 OR MORE DRINKS ON ONE OCCASION: NEVER
AUDIT-C TOTAL SCORE: 0

## 2024-07-11 ASSESSMENT — PAIN SCALES - GENERAL
PAINLEVEL_OUTOF10: 4
PAINLEVEL_OUTOF10: 8
PAINLEVEL_OUTOF10: 0 - NO PAIN
PAINLEVEL_OUTOF10: 3
PAINLEVEL_OUTOF10: 2

## 2024-07-11 ASSESSMENT — PATIENT HEALTH QUESTIONNAIRE - PHQ9
1. LITTLE INTEREST OR PLEASURE IN DOING THINGS: NOT AT ALL
SUM OF ALL RESPONSES TO PHQ9 QUESTIONS 1 & 2: 0
2. FEELING DOWN, DEPRESSED OR HOPELESS: NOT AT ALL

## 2024-07-11 ASSESSMENT — PAIN DESCRIPTION - PAIN TYPE: TYPE: ACUTE PAIN

## 2024-07-11 NOTE — ED PROVIDER NOTES
HPI   Chief Complaint   Patient presents with    Hip Pain     Pt diagnosed with torn muscle in left hip last week with no known injury. Pt states she was washing dishes today and felt worsening pain in left hip.        CC: Left hip pain,   HPI:   89-year-old female presents ED complaining of left hip, thigh pain, inability to bear any weight on her left lower extremity that started acutely this afternoon while she was trying to transition from a seated position to a standing position.  7/5/24 patient was admitted for intractable hip pain with small hip abductor tear and decreased mobility. CT pelvis showed no acute processes or hardware malfunction. patient denies any fever, chills, denies any left lower extremity numbness, paresthesia, denies any chest pain, shortness of breath or abdominal pain.  Patient reports having prior left total hip arthroplasty 40 years ago.     Additional Limitations to History:   External Records Reviewed: I reviewed recent and relevant outside records including   History Obtained From:     Past Medical History: Per HPI  Medications: Reviewed in EMR and with patient  Allergies:  Reviewed in EMR  Past Surgical History:   Social History:     ------------------------------------------------------------------------------------------------------  Physical Exam:  --Vital signs reviewed in nursing triage note, EMR flow sheets, and at patient's bedside  GEN:  A&Ox3, no acute distress, appears comfortable.  Conversational and appropriate.  No confusion or gross mental status changes.  EYES: EOMI, non-injected sclera.  ENT: Moist mucous membranes, no apparent injuries or lesions.   CARDIO: Normal rate and regular rhythm. No murmurs, rubs, or gallops.  2+ equal pulses of the distal extremities.   PULM: Clear to auscultation bilaterally. No rales, rhonchi, or wheezes. Good symmetric chest expansion.  GI: Soft, non-tender, non-distended. No rebound tenderness or guarding.  SKIN: Warm and dry, no  rashes or lesions.  MSK: ROM intact the extremities without contractures.   EXT: No peripheral edema, large area of soft tissue ecchymosis noted to the left lateral thigh, painful range of motion with abduction or adduction, the extremity is neurovascular intact distally, no obvious soft tissue swelling erythema to the knee.  NEURO: Cranial nerves II-XII grossly intact. Sensation to light touch intact and equal bilaterally in upper and lower extremities.  Symmetric   PSYCH: Appropriate mood and behavior, converses and responds appropriately during exam.  -------------------------------------------------------------------------------------------------------      Differential Diagnoses Considered:   Chronic Medical Conditions Significantly Affecting Care:   Diagnostic testing considered: [PERC, D-Dimer, PECARN, etc.]      - I independently interpreted: [CXR, CT, POCUS, etc. including your interpretation]  - Labs notable for     Escalation of Care: Appropriate for   Social Determinants of Health Significantly Affecting Care: [Homelessness, lacking transportation, uninsured, unable to afford medications]  Prescription Drug Consideration: [Antibiotics, antivirals, pain medications, etc.]  Discussion of Management with Other Providers:  I discussed the patient/results with: [admitting team, consultant, radiologist, social work, EPAT, case management, PT/OT, RT, PCP, etc.]      Chapo Augustin PA-C                          Ailyn Coma Scale Score: 15                     Patient History   Past Medical History:   Diagnosis Date    Acute cystitis without hematuria 05/10/2017    Acute cystitis without hematuria    Allergic rhinitis due to pollen 08/18/2017    Acute seasonal allergic rhinitis due to pollen    Body mass index (BMI) 28.0-28.9, adult 10/16/2018    BMI 28.0-28.9,adult    Body mass index (BMI) 29.0-29.9, adult 05/14/2020    BMI 29.0-29.9,adult    Cervicalgia 09/13/2013    Cervicalgia    Chronic ischemic heart  disease, unspecified 08/14/2013    Chronic myocardial ischemia    Chronic kidney disease, stage 4 (severe) (Multi) 11/18/2020    CKD (chronic kidney disease), stage IV    Flushing 08/28/2014    Flushing    Hereditary and idiopathic neuropathy, unspecified 08/14/2013    Hereditary and idiopathic neuropathy    Neurological symptoms 01/19/2024    Other acute sinusitis 12/15/2020    Other acute sinusitis, recurrence not specified    Other conditions influencing health status 03/26/2021    History of burning on urination    Other pulmonary embolism without acute cor pulmonale (Multi) 05/25/2022    Multiple pulmonary emboli    Other specified disorders of eustachian tube, right ear 10/04/2019    Dysfunction of right eustachian tube    Personal history of other diseases of the digestive system 12/02/2014    History of constipation    Personal history of other diseases of the musculoskeletal system and connective tissue 05/25/2022    History of polymyalgia rheumatica    Personal history of other diseases of urinary system 08/14/2013    History of pyelonephritis    Personal history of other specified conditions 06/12/2017    History of bruising easily    Personal history of other specified conditions 06/28/2013    History of syncope    Personal history of other specified conditions 01/04/2021    History of confusion    Personal history of other specified conditions 08/28/2014    History of excessive sweating    Personal history of other venous thrombosis and embolism 05/25/2022    History of deep venous thrombosis    Presence of other cardiac implants and grafts 09/12/2017    Status post placement of implantable loop recorder    Pyuria 01/04/2021    Pyuria    Shingles 05/08/2023    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    Suspected COVID-19 virus infection 05/08/2023    Syncope and collapse 11/24/2016    Near syncope    Urinary tract infection 05/08/2023    Weakness 01/19/2024      Past Surgical History:   Procedure Laterality Date    APPENDECTOMY  08/14/2013    Appendectomy    CARPAL TUNNEL RELEASE  05/22/2013    Neuroplasty Decompression Median Nerve At Carpal Tunnel    CATARACT EXTRACTION  08/14/2013    Cataract Surgery    CHOLECYSTECTOMY  08/14/2013    Cholecystectomy    COLONOSCOPY  08/14/2013    Complete Colonoscopy    CYSTOSCOPY  08/14/2013    Diagnostic Cystoscopy    DILATION AND CURETTAGE OF UTERUS  08/14/2013    Dilation And Curettage    MR HEAD ANGIO WO IV CONTRAST  11/14/2016    MR HEAD ANGIO WO IV CONTRAST 11/14/2016 [facility]    MR NECK ANGIO WO IV CONTRAST  11/14/2016    MR NECK ANGIO WO IV CONTRAST 11/14/2016 [facility]    OTHER SURGICAL HISTORY  05/22/2013    Neuroplasty With Transposition Of Ulnar Nerve    OTHER SURGICAL HISTORY  05/22/2013    Excision Of Lesion Fingers    OTHER SURGICAL HISTORY  05/22/2013    Osteotomy For Phalangeal Deformity Of Finger    OTHER SURGICAL HISTORY  09/07/2013    Cath Placement Of Stent 3    OTHER SURGICAL HISTORY  09/18/2013    Cardiac Cath Procedure Outcome: Successful    OTHER SURGICAL HISTORY  08/14/2013    Cath Stent 1 Stenosis    OTHER SURGICAL HISTORY  08/14/2013    Cath Stent 2 Initial    TONSILLECTOMY  08/14/2013    Tonsillectomy    TOTAL HIP ARTHROPLASTY  08/14/2013    Hip Replacement    TUBAL LIGATION  08/14/2013    Tubal Ligation    VARICOSE VEIN SURGERY  08/14/2013    Varicose Vein Ligation     No family history on file.  Social History     Tobacco Use    Smoking status: Never    Smokeless tobacco: Never   Substance Use Topics    Alcohol use: Never    Drug use: Never       Physical Exam   ED Triage Vitals [07/11/24 1154]   Temperature Heart Rate Respirations BP   36.4 °C (97.5 °F) 66 18 119/50      Pulse Ox Temp Source Heart Rate Source Patient Position   97 % Temporal Monitor Lying      BP Location FiO2 (%)     Right arm --       Physical Exam    ED Course & MDM   Diagnoses as of 07/12/24 1313   Left hip  pain   Unable to bear weight on left lower extremity       Medical Decision Making  89-year-old female with intractable left hip pain, unable to bear weight no significant mechanism of injury however suspicion for muscular injury, laboratory workup appears unremarkable, CT lumbar showed chronic depression deformity of the T12 and multiple levels of degenerative disc disease there is no evidence suggesting cord compression CT femur also completed no evidence of implant fracture or focal osteolysis.  Findings discussed with the ED attending, family, patient agreeable to observation with follow-on skilled nursing rehabilitation.  Patient is hemodynamically stable nontoxic-appearing, well-hydrated afebrile in no acute distress.        Procedure  Procedures     Chapo Augustin PA-C  07/12/24 [id number]

## 2024-07-11 NOTE — TELEPHONE ENCOUNTER
PT's daughter called and said pt is unable to unable to stand again (just like last time) and was wondering is she should go to the ER.  Dr Fall advised PT's daughter to take her to the ER.

## 2024-07-11 NOTE — SIGNIFICANT EVENT
Senior A/P:     89yo here for acute L hip pain, nontraumatic, starting today while standing. Couldn't walk or bear weight. No f/c, LBP, incontinence. Hip pain localized to L femoral head, worse with movement, better with rest. She has had hip replacement years ago. This is a overlying bruise. She was just admitted for same complaint on 7/5 and workup was neg and dc home. Today she is HDS without white count, CT femur shows no hardware malfunction or loosening or soft tissue injury.     #L Hip pain, likely muscular tear vs trochanteric bursitis   #decreased mobility   -CT femur wnl, CT L spine ordered   -pain reg   -PT/OT     #asymptomatic bacteruria   -will not continue abx     Chronic medical conditions:   #CAD - clopidogrel, statin   #T2DM - SSI   # Atrial fibrillation - Eliquis, carvedilol,    Dvtppx: eliquis     Dispo: obs for hip pain, decreased mobility. eLOS <48hrs. Pending placement.

## 2024-07-11 NOTE — H&P
"Subjective   Subjective:   HPI:  Andreia Camejo is a 89 y.o. female with a PMH of CKD IV, PE, Afib on chronic Eliquis and pacemaker, T2DM, degenerative disc disease, CAD who presented to [facility] with c/o intractable left hip pain.     Pt was discharged from [facility] on 7/7/2024 after being admitted for same complaint. On discharge, it was discussed if she would go to SNF vs home w/ home health, at that time her pain had improved some and she was able to walk, so she opted to go home.    At this time, hip pain is negligent at rest but \"excruciating\" when she attempts to bear weight on it. She describes it as a sharp pain directly over her hip joint. She does not have back or knee pain, the pain does not radiate anywhere else. She denies any trauma to the area, still has the ecchymosis over the hip. She denies SOB, chest pain, fever, chills, abdominal pain, pelvic pain, dysuria. At home, she has only been taking Tylenol for the pain.    12 Point ROS was reviewed and negative unless otherwise specified above.     ED COURSE:  Vitals:   - /92 , HR 60 , RR 18 , SpO2 95% on RA, T 97.5F  Labs:  - WBC 7.8, HGB 13.2,   - Na 138, Cl 102, K 4.7, bicarb 29, BUN 27, Cr 1.29 (baseline ~1.0 - 1.49)  - CRP 0.4  Imaging:  - CT left hip showed no fracture, osteolysis, unremarkable soft tissue structures   Interventions:   - Ketorolac injection    Medications: reviewed.    Code Status: DNR/DNI    ED Course:   Vitals:  /82 (BP Location: Right arm, Patient Position: Lying)   Pulse 60   Temp 36.4 °C (97.5 °F) (Temporal)   Resp 18   Wt 84.4 kg (186 lb)   SpO2 95%     Labs:  Lab Results   Component Value Date    WBC 7.8 07/11/2024    HGB 13.2 07/11/2024    HCT 40.1 07/11/2024     (H) 07/11/2024     07/11/2024     Lab Results   Component Value Date    GLUCOSE 124 (H) 07/11/2024    CALCIUM 8.8 07/11/2024     07/11/2024    K 4.7 07/11/2024    CO2 29 07/11/2024     07/11/2024    BUN 27 (H) " "07/11/2024    CREATININE 1.29 (H) 07/11/2024     Lab Results   Component Value Date    TROPHS 12 01/03/2024     Lab Results   Component Value Date     (H) 07/05/2024   No results found for: \"DDIMERVTE\"  Imaging:  CT femur left wo IV contrast   Final Result   Status post total left hip arthroplasty. There is no sign of   kareem-implant fracture or focal osteolysis.        Osteopenia.        The surrounding soft tissue structures are unremarkable.        MACRO:   None        Signed by: Luis Armando Mcdonald 7/11/2024 3:34 PM   Dictation workstation:   [id number]         Interventions:  Medications   ketorolac (Toradol) injection 15 mg (15 mg intravenous Given 7/11/24 8081)       Past Medical History:  She has a past medical history of Acute cystitis without hematuria (05/10/2017), Allergic rhinitis due to pollen (08/18/2017), Body mass index (BMI) 28.0-28.9, adult (10/16/2018), Body mass index (BMI) 29.0-29.9, adult (05/14/2020), Cervicalgia (09/13/2013), Chronic ischemic heart disease, unspecified (08/14/2013), Chronic kidney disease, stage 4 (severe) (Multi) (11/18/2020), Flushing (08/28/2014), Hereditary and idiopathic neuropathy, unspecified (08/14/2013), Neurological symptoms (01/19/2024), Other acute sinusitis (12/15/2020), Other conditions influencing health status (03/26/2021), Other pulmonary embolism without acute cor pulmonale (Multi) (05/25/2022), Other specified disorders of eustachian tube, right ear (10/04/2019), Personal history of other diseases of the digestive system (12/02/2014), Personal history of other diseases of the musculoskeletal system and connective tissue (05/25/2022), Personal history of other diseases of urinary system (08/14/2013), Personal history of other specified conditions (06/12/2017), Personal history of other specified conditions (06/28/2013), Personal history of other specified conditions (01/04/2021), Personal history of other specified conditions (08/28/2014), Personal " "history of other venous thrombosis and embolism (05/25/2022), Presence of other cardiac implants and grafts (09/12/2017), Pyuria (01/04/2021), Shingles (05/08/2023), 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    Past Surgical History:  She has a past surgical history that includes Carpal tunnel release (05/22/2013); Other surgical history (05/22/2013); Other surgical history (05/22/2013); Other surgical history (05/22/2013); Other surgical history (09/07/2013); Other surgical history (09/18/2013); Cataract extraction (08/14/2013); Total hip arthroplasty (08/14/2013); Dilation and curettage of uterus (08/14/2013); Appendectomy (08/14/2013); Tubal ligation (08/14/2013); Other surgical history (08/14/2013); Other surgical history (08/14/2013); Cholecystectomy (08/14/2013); Colonoscopy (08/14/2013); Cystoscopy (08/14/2013); Tonsillectomy (08/14/2013); Varicose vein surgery (08/14/2013); MR angio head wo IV contrast (11/14/2016); and MR angio neck wo IV contrast (11/14/2016).    Social History:  She reports that she has never smoked. She has never used smokeless tobacco. She reports that she does not drink alcohol and does not use drugs.    Family History:  No family history on file.  Allergies:  Influenza virus vaccine whole, Iodinated contrast media, Penicillins, Lisinopril, Moxifloxacin, Red dye, and Adhesive tape-silicones    Home Medications:  (Not in a hospital admission)    Review Of Systems:  11-point ROS was performed and is negative except as noted in the HPI.        Objective   Objective:     /82 (BP Location: Right arm, Patient Position: Lying)   Pulse 60   Temp 36.4 °C (97.5 °F) (Temporal)   Resp 18   Ht 1.727 m (5' 8\")   Wt 84.4 kg (186 lb)   SpO2 95%   BMI 28.28 kg/m²     Physical Exam  Constitutional:       Appearance: " Normal appearance. She is normal weight.   Cardiovascular:      Rate and Rhythm: Normal rate and regular rhythm.      Pulses: Normal pulses.      Heart sounds: Normal heart sounds.   Pulmonary:      Effort: Pulmonary effort is normal.      Breath sounds: Normal breath sounds.   Abdominal:      General: Abdomen is flat.      Palpations: Abdomen is soft.   Musculoskeletal:         General: No swelling.   Skin:     General: Skin is warm and dry.      Findings: Bruising present.      Comments: Over left hip   Neurological:      General: No focal deficit present.      Mental Status: She is alert and oriented to person, place, and time.       Lab Work:     Lab Results   Component Value Date    WBC 7.8 07/11/2024    HGB 13.2 07/11/2024    HCT 40.1 07/11/2024     (H) 07/11/2024     07/11/2024     Lab Results   Component Value Date    GLUCOSE 124 (H) 07/11/2024    CALCIUM 8.8 07/11/2024     07/11/2024    K 4.7 07/11/2024    CO2 29 07/11/2024     07/11/2024    BUN 27 (H) 07/11/2024    CREATININE 1.29 (H) 07/11/2024     POC HEMOGLOBIN A1c   Date Value Ref Range Status   11/15/2023 8.3 (A) 4.2 - 6.5 % Final   05/08/2023 8.8 (A) 4.2 - 6.5 % Final     Hemoglobin A1C   Date Value Ref Range Status   04/18/2024 8.1 (H) see below % Final     Thyroid Stimulating Hormone   Date Value Ref Range Status   04/18/2024 0.97 0.44 - 3.98 mIU/L Final     TSH   Date Value Ref Range Status   05/19/2021 1.00 0.44 - 3.98 mIU/L Final     Comment:      TSH testing is performed using different testing    methodology at [facility] than at other    [facility]. Direct result comparisons should    only be made within the same method.     10/17/2019 1.03 0.44 - 3.98 mIU/L Final     Comment:      TSH testing is performed using different testing    methodology at [facility] than at other    [facility]. Direct result comparisons should    only be made within the same method.  .   Patients  receiving more than 5 mg/day of biotin may have interference   in test results.  A sample should be taken no sooner than eight hours   after  previous dose. Contact [phone] for additional information.         Images:     CT femur left wo IV contrast   Final Result   Status post total left hip arthroplasty. There is no sign of   kareem-implant fracture or focal osteolysis.        Osteopenia.        The surrounding soft tissue structures are unremarkable.        MACRO:   None        Signed by: Luis Armando Mcdonald 7/11/2024 3:34 PM   Dictation workstation:   [id number]         Medications:     Scheduled Meds:  Continuous Meds:    PRN Meds:       Assessment & Plan:     Andreia Camejo is a 89 y.o. female with a of PMH of CKD IV, PE, Afib on chronic Eliquis and pacemaker, T2DM, degenerative disc disease, CAD . Admitted for intractable left hip pain.     ACUTE MEDICAL ISSUES:  #Small hip abductor tear vs trochanteric bursitis:   # Ambulatory dysfunction  - No evidence of infection; afebrile, no leukocytosis, CRP WNL  - CT left femur showed no evidence of hardware failure, fracture  - CT left femur showed enthesophyte formation at gluteal tendon attachment to trochanter  PLAN:  - CT lumbar spine   - PT/OT  - Tylenol for pain control      CHRONIC MEDICAL ISSUES:  #DM2 - Insulin Lispro SSI 0-10 units TID   #Afib - continue home Eliquis, carvedilol; telemetry monitoring  #CKD - baseline 1.01-1.49   #CAD - continue home plavix, statin    Fluid: Replete PRN  Electrolytes: Replete PRN  Nutrition: low carb diet, adult regular  GI ppx: none  Abx: none   IV Lines: PIV  O2: RA    Dispo: obs and placement for SNF, ELS 1-2 days    Patricia Najera DO, PGY-1  Internal Medicine    Disclaimer: Documentation completed with the information available at the time of input. The times in the chart may not be reflective of actual patient care times, interventions, or procedures. Documentation occurs after the physical care of the   patient.

## 2024-07-11 NOTE — PROGRESS NOTES
Pharmacy Medication History Review    Andreia Camejo is a 89 y.o. female admitted for Hip pain, acute, left. Pharmacy reviewed the patient's [id number] medications and allergies for accuracy.    The list below reflectives the updated PTA list. Please review each medication in order reconciliation for additional clarification and justification.  Prior to Admission Medications   Prescriptions Last Dose Informant Patient Reported? Taking?   Eliquis 5 mg tablet 7/11/2024 at am  No Yes   Sig: TAKE 1 TABLET EVERY 12 HOURS   acetaminophen (Tylenol) 500 mg tablet 7/11/2024 at am  Yes Yes   Sig: Take 2 tablets (1,000 mg) by mouth 2 times a day.   carvedilol (Coreg) 3.125 mg tablet 7/11/2024 at am  No Yes   Sig: TAKE 1 TABLET TWICE DAILY WITH MEALS   cholecalciferol (Vitamin D-3) 50 MCG (2000 UT) tablet 7/10/2024 at 12:00  Yes Yes   Sig: Take 1 tablet (2,000 Units) by mouth once daily.   clopidogrel (Plavix) 75 mg tablet 7/11/2024 at am  No Yes   Sig: TAKE 1 TABLET ONE TIME DAILY   gabapentin (Neurontin) 300 mg capsule 7/11/2024 at am  No Yes   Sig: TAKE 1 CAPSULE EVERY 6 HOURS   lubricating eye drops ophthalmic solution 7/11/2024 at am  Yes Yes   Sig: Administer 1 drop into both eyes 4 times a day.   nitroglycerin (Nitrostat) 0.4 mg SL tablet   No Yes   Sig: PLACE 1 TABLET (0.4 MG) UNDER THE TONGUE EVERY 5 MINUTES AS NEEDED FOR CHEST PAIN   nystatin (Mycostatin) cream Unknown  Yes Yes   Sig: Apply topically if needed.   rosuvastatin (Crestor) 10 mg tablet 7/10/2024 at pm  No Yes   Sig: TAKE 1 TABLET AT BEDTIME   sulfamethoxazole-trimethoprim (Bactrim DS) 800-160 mg tablet 7/11/2024 at am  No Yes   Sig: Take 1 tablet by mouth 2 times a day for 10 days.      Facility-Administered Medications: None         The list below reflectives the updated allergy list. Please review each documented allergy for additional clarification and justification.  Allergies  Reviewed by Agatha Vivar RN on 7/11/2024        Severity  Reactions Comments    Influenza Virus Vaccine Whole High Anaphylaxis     Iodinated Contrast Media High Anaphylaxis, Unknown, Itching     Penicillins High Unknown, Swelling, Anaphylaxis     Lisinopril Not Specified Unknown     Moxifloxacin Not Specified Unknown     Red Dye Not Specified Dizziness, Unknown flushed all over    Adhesive Tape-silicones Low Rash, Unknown             Below are additional concerns with the patient's PTA list.      Suyapa Sinclair

## 2024-07-12 LAB
GLUCOSE BLD MANUAL STRIP-MCNC: 130 MG/DL (ref 74–99)
GLUCOSE BLD MANUAL STRIP-MCNC: 143 MG/DL (ref 74–99)
GLUCOSE BLD MANUAL STRIP-MCNC: 175 MG/DL (ref 74–99)
GLUCOSE BLD MANUAL STRIP-MCNC: 273 MG/DL (ref 74–99)

## 2024-07-12 PROCEDURE — 97161 PT EVAL LOW COMPLEX 20 MIN: CPT | Mod: GP

## 2024-07-12 PROCEDURE — 82947 ASSAY GLUCOSE BLOOD QUANT: CPT | Mod: 91

## 2024-07-12 PROCEDURE — 2500000002 HC RX 250 W HCPCS SELF ADMINISTERED DRUGS (ALT 637 FOR MEDICARE OP, ALT 636 FOR OP/ED)

## 2024-07-12 PROCEDURE — 2500000005 HC RX 250 GENERAL PHARMACY W/O HCPCS

## 2024-07-12 PROCEDURE — G0378 HOSPITAL OBSERVATION PER HR: HCPCS

## 2024-07-12 PROCEDURE — 99222 1ST HOSP IP/OBS MODERATE 55: CPT

## 2024-07-12 PROCEDURE — 2500000001 HC RX 250 WO HCPCS SELF ADMINISTERED DRUGS (ALT 637 FOR MEDICARE OP)

## 2024-07-12 PROCEDURE — 97165 OT EVAL LOW COMPLEX 30 MIN: CPT | Mod: GO

## 2024-07-12 RX ORDER — INSULIN LISPRO 100 [IU]/ML
0-10 INJECTION, SOLUTION INTRAVENOUS; SUBCUTANEOUS
Status: DISCONTINUED | OUTPATIENT
Start: 2024-07-12 | End: 2024-07-13 | Stop reason: HOSPADM

## 2024-07-12 RX ORDER — OXYCODONE HYDROCHLORIDE 5 MG/1
2.5 TABLET ORAL EVERY 4 HOURS PRN
Status: DISCONTINUED | OUTPATIENT
Start: 2024-07-12 | End: 2024-07-13 | Stop reason: HOSPADM

## 2024-07-12 ASSESSMENT — COGNITIVE AND FUNCTIONAL STATUS - GENERAL
DRESSING REGULAR LOWER BODY CLOTHING: A LITTLE
MOBILITY SCORE: 19
HELP NEEDED FOR BATHING: A LITTLE
CLIMB 3 TO 5 STEPS WITH RAILING: A LITTLE
TOILETING: A LITTLE
MOVING FROM LYING ON BACK TO SITTING ON SIDE OF FLAT BED WITH BEDRAILS: A LITTLE
WALKING IN HOSPITAL ROOM: A LITTLE
STANDING UP FROM CHAIR USING ARMS: A LITTLE
MOVING FROM LYING ON BACK TO SITTING ON SIDE OF FLAT BED WITH BEDRAILS: A LITTLE
DAILY ACTIVITIY SCORE: 20
TOILETING: A LITTLE
STANDING UP FROM CHAIR USING ARMS: A LITTLE
WALKING IN HOSPITAL ROOM: A LOT
MOVING FROM LYING ON BACK TO SITTING ON SIDE OF FLAT BED WITH BEDRAILS: A LITTLE
MOVING TO AND FROM BED TO CHAIR: A LITTLE
TURNING FROM BACK TO SIDE WHILE IN FLAT BAD: A LITTLE
MOBILITY SCORE: 16
CLIMB 3 TO 5 STEPS WITH RAILING: A LOT
CLIMB 3 TO 5 STEPS WITH RAILING: A LOT
PERSONAL GROOMING: A LITTLE
DRESSING REGULAR LOWER BODY CLOTHING: A LITTLE
DAILY ACTIVITIY SCORE: 21
MOBILITY SCORE: 16
TOILETING: A LITTLE
WALKING IN HOSPITAL ROOM: A LOT
MOVING TO AND FROM BED TO CHAIR: A LITTLE
HELP NEEDED FOR BATHING: A LITTLE
HELP NEEDED FOR BATHING: A LITTLE
DRESSING REGULAR UPPER BODY CLOTHING: A LITTLE
TURNING FROM BACK TO SIDE WHILE IN FLAT BAD: A LITTLE
TURNING FROM BACK TO SIDE WHILE IN FLAT BAD: A LITTLE
DAILY ACTIVITIY SCORE: 20
MOVING TO AND FROM BED TO CHAIR: A LITTLE
DRESSING REGULAR LOWER BODY CLOTHING: A LITTLE

## 2024-07-12 ASSESSMENT — ACTIVITIES OF DAILY LIVING (ADL)
BATHING_ASSISTANCE: MINIMAL
ADL_ASSISTANCE: INDEPENDENT
ADL_ASSISTANCE: INDEPENDENT

## 2024-07-12 ASSESSMENT — PAIN SCALES - GENERAL
PAINLEVEL_OUTOF10: 0 - NO PAIN

## 2024-07-12 ASSESSMENT — PAIN - FUNCTIONAL ASSESSMENT
PAIN_FUNCTIONAL_ASSESSMENT: 0-10

## 2024-07-12 NOTE — HOSPITAL COURSE
Andreia Camejo is a 89F with pmhx of  CKD IV, PE, Afib on chronic Eliquis and pacemaker, T2DM, degenerative disc disease, CAD admitted on 7/11/24 for intractable right hip pain. She was admitted for the same thing from 7/5-7/7/24, however she was able to walk and the pain was resolving while inpatient so opted to go home w/ home health rather than to SNF following that stay.  At home, she began experiencing the pain again and was unable to ambulate, prompting her to return. CT femur, CT lumbar spine both negative for acute process. No evidence of infection, no leukocytosis, afebrile, no increased pain/swelling.      No fever, leukocytosis. Hemodynamically stable. Ortho was consulted and did not recommend any acute interventions at this time. Most likely muscle strain/tear vs trochanteric bursitis at location of ecchymosis on left upper leg. She was seen by PT/OT which recommended SNF placement.    Pt is hemodynamically stable and will be discharged to SNF on home medications. Pt aware of the plan and voiced agreement prior to discharge

## 2024-07-12 NOTE — PROGRESS NOTES
Physical Therapy    Physical Therapy Evaluation    Patient Name: Andreia Camejo  MRN: [id number]  Today's Date: 7/12/2024   Time Calculation  Start Time: 0915  Stop Time: 0932  Time Calculation (min): 17 min    Assessment/Plan   PT Assessment  PT Assessment Results: Decreased strength, Impaired balance, Decreased mobility  Rehab Prognosis: Fair  Barriers to Discharge: limited mobility due to symptoms  Evaluation/Treatment Tolerance: Patient tolerated treatment well  Medical Staff Made Aware: Yes  Strengths: Ability to acquire knowledge, Access to adaptive/assistive products, Capable of completing ADLs semi/independent, Support of Caregivers  Barriers to Participation: Comorbidities  End of Session Communication: Bedside nurse  Assessment Comment: Pt tolerated PT eval well. Reports dizziness upon postional changes, resolves when returned to supine. Demonstrates limited mobiltiy due to recurring L hip pain. Ambulated with 3 sidesteps to HOB, demonstrating dec stance time on L LE. Recommend mod intensity therapy to return to baseline.  End of Session Patient Position: Bed, 3 rail up, Alarm on     PT Plan  Treatment/Interventions: Transfer training, Gait training, Stair training, Balance training, Strengthening  PT Plan: Ongoing PT  PT Frequency: 3 times per week  PT Discharge Recommendations: Moderate intensity level of continued care  Equipment Recommended upon Discharge: Wheeled walker (Owns)  PT Recommended Transfer Status: Assist x1  PT - OK to Discharge: Yes (Per PT poc)      Subjective   General Visit Information:  General  Reason for Referral: Impaired mobility, acute L hip pain  Referred By: Arias Lyman  Past Medical History Relevant to Rehab: CKD IV, PE, Afib on chronic Eliquis and pacemaker, T2DM, degenerative disc disease, CAD  Prior to Session Communication: Bedside nurse  Patient Position Received: Bed, 3 rail up, Alarm on  General Comment: Pt pleasant and cooperative with PT eval  Home Living:  Home  Living  Type of Home: House  Lives With: Alone  Home Adaptive Equipment: Walker rolling or standard, Cane  Home Layout: One level  Home Access: Stairs to enter with rails  Entrance Stairs-Rails: Both  Entrance Stairs-Number of Steps: 3  Bathroom Shower/Tub: Walk-in shower  Bathroom Equipment: Grab bars in shower, Shower chair with back  Home Living Comments: Dtr lives across street, visits everyday  Prior Level of Function:  Prior Function Per Pt/Caregiver Report  Level of Brave: Independent with ADLs and functional transfers, Independent with homemaking with ambulation  Receives Help From: Family  ADL Assistance: Independent  Homemaking Assistance: Needs assistance (cleaning lady comes over once every two weeks, otherwise independent)  Ambulatory Assistance: Independent (Rollator or cane)  Prior Function Comments: (-) driving  Precautions:  Precautions  Medical Precautions: Fall precautions (Tele, purewick)      Objective   Pain:  Pain Assessment  Pain Assessment: 0-10  0-10 (Numeric) Pain Score: 0 - No pain  Cognition:  Cognition  Overall Cognitive Status: Within Functional Limits  Orientation Level: Oriented X4    General Assessments:  General Observation  General Observation: Dizziness with position changes, resolve once return to supine, RN notified CT Femur IMPRESSION: Status post total left hip arthroplasty. There is no sign of kareem-implant fracture or focal osteolysis. Osteopenia. The surrounding soft tissue structures are unremarkable. CT lumbar IMPRESSION: Mild stable chronic compression deformity of T12. Lumbar vertebral bodies are otherwise intact with diffuse degenerative disc space narrowing similar to prior. There is osteopenia which limits evaluation for nondisplaced fractures. Slightly progressed facet arthropathy since 2014 with moderate to severe right foraminal stenosis at L4-L5 and moderate bilateral foraminal stenosis at L5-S1. No evidence of high-grade central canal stenosis. (Dizziness  with position changes, resolve once return to supine, RN notified.)    Activity Tolerance  Endurance: Tolerates less than 10 min exercise, no significant change in vital signs    Sensation  Light Touch: No apparent deficits    Strength  Strength Comments: L hip 3/5, L knee 4/5, R LE 4/5 (No resistance applied to L hip due to irritability)  Coordination  Movements are Fluid and Coordinated: Yes  Heel to Shin: Intact    Postural Control  Postural Control: Within Functional Limits    Static Sitting Balance  Static Sitting-Balance Support: No upper extremity supported, Feet supported  Static Sitting-Level of Assistance: Close supervision    Static Standing Balance  Static Standing-Balance Support: Bilateral upper extremity supported  Static Standing-Level of Assistance: Contact guard  Functional Assessments:       Bed Mobility  Bed Mobility: Yes  Bed Mobility 1  Bed Mobility 1: Supine to sitting, Sitting to supine  Level of Assistance 1: Close supervision  Bed Mobility Comments 1: HOB elevated, use of rails    Transfers  Transfer: Yes  Transfer 1  Transfer From 1: Sit to, Stand to  Transfer to 1: Stand, Sit  Technique 1: Sit to stand, Stand to sit  Transfer Device 1: Walker  Transfer Level of Assistance 1: Contact guard  Trials/Comments 1: Flexed forward position, require inc time to stand upright    Ambulation/Gait Training  Ambulation/Gait Training Performed: Yes  Ambulation/Gait Training 1  Surface 1: Level tile  Device 1: Rolling walker  Assistance 1: Contact guard  Quality of Gait 1: Narrow base of support, Forward flexed posture (Kyphotic posture, Dec stance time L LE)  Comments/Distance (ft) 1: 3' (Sidesteps to HOB, limited due to dizziness in upright positions)    Stairs  Stairs: No    Outcome Measures:  [facility] Basic Mobility  Turning from your back to your side while in a flat bed without using bedrails: A little  Moving from lying on your back to sitting on the side of a flat bed without using bedrails: A  little  Moving to and from bed to chair (including a wheelchair): A little  Standing up from a chair using your arms (e.g. wheelchair or bedside chair): A little  To walk in hospital room: A lot  Climbing 3-5 steps with railing: A lot  Basic Mobility - Total Score: 16    Encounter Problems       Encounter Problems (Active)       Balance       STG - Maintains dynamic standing balance with dual task without upper extremity support for 3 minutes independently  (Progressing)       Start:  07/12/24    Expected End:  07/26/24               Mobility       STG - Patient will ambulate 25' with LRD MOD I  (Progressing)       Start:  07/12/24    Expected End:  07/26/24            STG - Patient will ascend and descend three stairs with use of B rails MOD I  (Progressing)       Start:  07/12/24    Expected End:  07/26/24               PT Transfers       STG - Patient will transfer sit to and from stand independently  (Progressing)       Start:  07/12/24    Expected End:  07/26/24                   Education Documentation  Precautions, taught by HARDIK Polanco at 7/12/2024 10:01 AM.  Learner: Patient  Readiness: Eager  Method: Explanation, Demonstration  Response: Verbalizes Understanding    Body Mechanics, taught by HARDIK Polanco at 7/12/2024 10:01 AM.  Learner: Patient  Readiness: Eager  Method: Explanation, Demonstration  Response: Verbalizes Understanding    Mobility Training, taught by HARDIK Polanco at 7/12/2024 10:01 AM.  Learner: Patient  Readiness: Eager  Method: Explanation, Demonstration  Response: Verbalizes Understanding    Education Comments  No comments found.

## 2024-07-12 NOTE — PROGRESS NOTES
"Subjective   Subjective:   History Of Present Illness:  Andreia Camejo is a 89 y.o. female was seen and evaluated at bedside today. Overnight, no reported acute events. Patient is in no acute distress. She reports her hip feels much improved from yesterday, though she has not yet tried to stand while inpatient, which was when most of the pain occurred.    Denies chest pain, fever, SOB, increased swelling/bruising. Reports she was comfortable overnight, is open to attempting PT/OT while here. She is agreeable to going to SNF from hospital as she does not want to go home and lose ability to ambulate again, but would like to get out quickly.           Objective   Objective:     /67   Pulse 63   Temp 36.2 °C (97.2 °F) (Temporal)   Resp 18   Ht 1.727 m (5' 8\")   Wt 84.4 kg (186 lb)   SpO2 95%   BMI 28.28 kg/m²     Physical Exam  Constitutional:       General: She is not in acute distress.     Appearance: Normal appearance.   Cardiovascular:      Rate and Rhythm: Normal rate and regular rhythm.      Pulses: Normal pulses.      Heart sounds: Murmur heard.      Comments: Faint murmur 2nd RICS, difficult to hear  Pulmonary:      Effort: Pulmonary effort is normal.      Breath sounds: Normal breath sounds.   Skin:     Findings: Bruising present.      Comments: Left hip   Neurological:      General: No focal deficit present.      Mental Status: She is alert.       Lab Work:     Lab Results   Component Value Date    WBC 7.8 07/11/2024    HGB 13.2 07/11/2024    HCT 40.1 07/11/2024     (H) 07/11/2024     07/11/2024     Lab Results   Component Value Date    GLUCOSE 124 (H) 07/11/2024    CALCIUM 8.8 07/11/2024     07/11/2024    K 4.7 07/11/2024    CO2 29 07/11/2024     07/11/2024    BUN 27 (H) 07/11/2024    CREATININE 1.29 (H) 07/11/2024     POC HEMOGLOBIN A1c   Date Value Ref Range Status   11/15/2023 8.3 (A) 4.2 - 6.5 % Final   05/08/2023 8.8 (A) 4.2 - 6.5 % Final     Hemoglobin A1C   Date " Value Ref Range Status   04/18/2024 8.1 (H) see below % Final     Thyroid Stimulating Hormone   Date Value Ref Range Status   04/18/2024 0.97 0.44 - 3.98 mIU/L Final     TSH   Date Value Ref Range Status   05/19/2021 1.00 0.44 - 3.98 mIU/L Final     Comment:      TSH testing is performed using different testing    methodology at [facility] than at other    system hospitals. Direct result comparisons should    only be made within the same method.     10/17/2019 1.03 0.44 - 3.98 mIU/L Final     Comment:      TSH testing is performed using different testing    methodology at [facility] than at other    [facility]. Direct result comparisons should    only be made within the same method.  .   Patients receiving more than 5 mg/day of biotin may have interference   in test results.  A sample should be taken no sooner than eight hours   after  previous dose. Contact [phone] for additional information.         Images:     CT femur left wo IV contrast   Final Result   Status post total left hip arthroplasty. There is no sign of   kareem-implant fracture or focal osteolysis.        Osteopenia.        The surrounding soft tissue structures are unremarkable.        MACRO:   None        Signed by: Luis Armando Mcdonald 7/11/2024 3:34 PM   Dictation workstation:   [id number]         Medications:     Scheduled:  acetaminophen, 975 mg, oral, q6h  apixaban, 5 mg, oral, q12h  carvedilol, 3.125 mg, oral, BID  cholecalciferol, 2,000 Units, oral, Daily  clopidogrel, 75 mg, oral, Daily  gabapentin, 300 mg, oral, q8h  insulin lispro, 0-5 Units, subcutaneous, Before meals & nightly  lidocaine, 1 patch, transdermal, Daily  lubricating eye drops, 1 drop, Both Eyes, 4x daily  rosuvastatin, 10 mg, oral, Nightly    Continuous:     PRN:  PRN medications: dextrose, dextrose, glucagon, glucagon, nitroglycerin, oxyCODONE     Assessment & Plan:     Andreia ANDERSEN Bashir is a 89 y.o. female with a of PMH of CKD IV, PE,  Afib on chronic Eliquis and pacemaker, T2DM, degenerative disc disease, CAD . Admitted for intractable left hip pain.      ACUTE MEDICAL ISSUES:  #Small hip abductor tear vs trochanteric bursitis:   # Ambulatory dysfunction  - Improved today, though she has not yet been out of bed  - CT lumbar spine only showed mild degenerative changes, no acute processes  PLAN:  - PT/OT  - Tylenol, lidocaine patch for pain control  - Pending placement to SNF to improve strength, pt in agreement w/ this plan        CHRONIC MEDICAL ISSUES:  #DM2 - Insulin Lispro SSI 0-10 units TID   #Afib - continue home Eliquis, carvedilol; telemetry monitoring  #CKD - baseline 1.01-1.49   #CAD - continue home plavix, statin     Fluid: Replete PRN  Electrolytes: Replete PRN  Nutrition: low carb diet, adult regular  GI ppx: none  Abx: none   IV Lines: PIV  O2: RA     Dispo: Pending placement for SNF, ELS 1-2 days     Patricia Najera DO, PGY-1  Internal Medicine    Disclaimer: Documentation completed with the information available at the time of input. The times in the chart may not be reflective of actual patient care times, interventions, or procedures. Documentation occurs after the physical care of the patient.

## 2024-07-12 NOTE — PROGRESS NOTES
Occupational Therapy    Evaluation    Patient Name: Andreia Camejo  MRN: [id number]  Today's Date: 7/12/2024  Time Calculation  Start Time: 1150  Stop Time: 1205  Time Calculation (min): 15 min    Assessment  IP OT Assessment  OT Assessment: Pt presents with decreased endurance impacting ADL performance and functional mobility. Continued skilled OT recommended to maximize pt safety and independence with home-going  Prognosis: Good  Barriers to Discharge: None  Evaluation/Treatment Tolerance: Patient limited by fatigue  Medical Staff Made Aware: Yes  End of Session Communication: Bedside nurse  End of Session Patient Position: Bed, 3 rail up, Alarm on  Plan:  Treatment Interventions: ADL retraining, Functional transfer training, UE strengthening/ROM, Endurance training  OT Frequency: 2 times per week  OT Discharge Recommendations: Low intensity level of continued care  Equipment Recommended upon Discharge: Wheeled walker  OT Recommended Transfer Status: Stand by assist  OT - OK to Discharge: Yes (per OT POC)    Subjective   Current Problem:  1. Left hip pain        2. Unable to bear weight on left lower extremity          General:  General  Reason for Referral: 89 yo female referred to OT for impaired ADL/mobility  Referred By: Arias Lyman  Past Medical History Relevant to Rehab: CKD IV, PE, Afib on chronic Eliquis and pacemaker, T2DM, degenerative disc disease, CAD  Prior to Session Communication: Bedside nurse  Patient Position Received: Bed, 3 rail up, Alarm on  General Comment: Pt pleasant and cooperative with OT eval  Precautions:  Medical Precautions: Fall precautions (tele, purewick)     Pain:  Pain Assessment  Pain Assessment: 0-10  0-10 (Numeric) Pain Score: 0 - No pain    Objective   Cognition:  Overall Cognitive Status: Within Functional Limits  Orientation Level: Oriented X4           Home Living:  Type of Home: House  Lives With: Alone  Home Adaptive Equipment: Walker rolling or standard, Cane  Home  Layout: One level  Home Access: Stairs to enter with rails  Entrance Stairs-Rails: Both  Entrance Stairs-Number of Steps: 3  Bathroom Shower/Tub: Walk-in shower  Bathroom Equipment: Grab bars in shower, Shower chair with back  Home Living Comments: Dtr lives across street, visits everyday   Prior Function:  Level of [place]: Independent with ADLs and functional transfers, Independent with homemaking with ambulation  Receives Help From: Family  ADL Assistance: Independent  Homemaking Assistance: Needs assistance (has cleaning assistance 1x/2wk)  Ambulatory Assistance: Independent (with rollator)  Prior Function Comments: (-) driving     ADL:  Eating Assistance: Independent  Grooming Assistance: Independent  Bathing Assistance: Minimal  UE Dressing Assistance: Independent  LE Dressing Assistance: Minimal  Toileting Assistance with Device: Minimal  Functional Assistance: Stand by  ADL Comments: ADL performance anticipated d/t current clinical presentation  Activity Tolerance:  Endurance: Tolerates less than 10 min exercise, no significant change in vital signs  Bed Mobility/Transfers: Bed Mobility  Bed Mobility: Yes  Bed Mobility 1  Bed Mobility 1: Supine to sitting, Sitting to supine  Level of Assistance 1: Close supervision  Bed Mobility Comments 1: HOB elevated    Transfers  Transfer: Yes  Transfer 1  Transfer From 1: Sit to, Stand to  Transfer to 1: Stand, Sit  Technique 1: Sit to stand, Stand to sit  Transfer Device 1: Walker  Transfer Level of Assistance 1: Close supervision      Functional Mobility:  Functional Mobility  Functional Mobility Performed: Yes  Functional Mobility 1  Surface 1: Level tile  Device 1: Rolling walker  Assistance 1: Contact guard  Comments 1: Ambulated housheold distance from bed <> doorway with good balance, good pace, limited ednurance  Sitting Balance:  Static Sitting Balance  Static Sitting-Balance Support: Feet supported  Static Sitting-Level of Assistance:  Independent  Standing Balance:  Static Standing Balance  Static Standing-Balance Support: Bilateral upper extremity supported  Static Standing-Level of Assistance: Close supervision    Strength:  Strength Comments: LUE 3+/5, RUE 4/5    Coordination:  Movements are Fluid and Coordinated: Yes   Hand Function:  Hand Function  Gross Grasp: Functional  Coordination: Functional  Extremities: RUE   RUE : Exceptions to WFL (shoulder flexion, ER impaired, eblow to distal WFL) and LUE   LUE: Exceptions to WFL (shoulder flexion, IR/ER impaired, eblow to distal WFL)    Outcome Measures: [facility] Daily Activity  Putting on and taking off regular lower body clothing: A little  Bathing (including washing, rinsing, drying): A little  Putting on and taking off regular upper body clothing: None  Toileting, which includes using toilet, bedpan or urinal: A little  Taking care of personal grooming such as brushing teeth: A little  Eating Meals: None  Daily Activity - Total Score: 20      Education Documentation  ADL Training, taught by Copoer Villanueva OT at 7/12/2024  1:13 PM.  Learner: Patient  Readiness: Acceptance  Method: Explanation  Response: Verbalizes Understanding    Education Comments  No comments found.      Goals:   Encounter Problems       Encounter Problems (Active)       OT Goals       Pt will demo and/or verbalize 2-3 energy conservation techniques to incorporate into functional mobility or ADL to improve performance and increase independence.        Start:  07/12/24    Expected End:  07/26/24            Pt will complete bhqr-kc-gurl transfers using LRD in preparation for ADLs with Mod Alexander       Start:  07/12/24    Expected End:  07/26/24            Pt will increase endurance to tolerate 15min of OOB activity with no more than 1 rest break in order to increase ability to engage in ADL completion.        Start:  07/12/24    Expected End:  07/26/24            Pt will demo UE/LE ADL completion with modified  independence, using AE if needed.        Start:  07/12/24    Expected End:  07/26/24

## 2024-07-12 NOTE — DISCHARGE INSTRUCTIONS
Your symptoms were due to trochanteric bursitis vs muscle tear. You are now stable enough to be discharged.    The following recommendations are made for you at time of hospital discharge:  - Please take your home medications as instructed prior to hospitalization. No changes have been made to home medications.  - Please follow-up with your primary care provider within 5-7 days from time of discharge. Likely will need to bring photo ID and insurance card to your appointment.   - [facility] services has been requested to make an appointment for you however if you do not hear back from them within 2-3 days, please call [phone] to find out about appointments with  services.  - If you experience any worsening symptoms or any new acute concerns arise, please contact your primary care provider to discuss and possibly arrange an appointment. If you cannot get in touch with provider or severe symptoms are present, please return to nearest emergency room/urgent care for evaluation and treatment.

## 2024-07-13 VITALS
TEMPERATURE: 97.3 F | BODY MASS INDEX: 28.19 KG/M2 | OXYGEN SATURATION: 93 % | DIASTOLIC BLOOD PRESSURE: 55 MMHG | HEIGHT: 68 IN | WEIGHT: 186 LBS | SYSTOLIC BLOOD PRESSURE: 110 MMHG | RESPIRATION RATE: 18 BRPM | HEART RATE: 63 BPM

## 2024-07-13 LAB
GLUCOSE BLD MANUAL STRIP-MCNC: 134 MG/DL (ref 74–99)
GLUCOSE BLD MANUAL STRIP-MCNC: 179 MG/DL (ref 74–99)

## 2024-07-13 PROCEDURE — 2500000001 HC RX 250 WO HCPCS SELF ADMINISTERED DRUGS (ALT 637 FOR MEDICARE OP)

## 2024-07-13 PROCEDURE — G0378 HOSPITAL OBSERVATION PER HR: HCPCS

## 2024-07-13 PROCEDURE — 94760 N-INVAS EAR/PLS OXIMETRY 1: CPT

## 2024-07-13 PROCEDURE — 82947 ASSAY GLUCOSE BLOOD QUANT: CPT

## 2024-07-13 PROCEDURE — 99239 HOSP IP/OBS DSCHRG MGMT >30: CPT

## 2024-07-13 PROCEDURE — 2500000005 HC RX 250 GENERAL PHARMACY W/O HCPCS

## 2024-07-13 ASSESSMENT — COGNITIVE AND FUNCTIONAL STATUS - GENERAL
HELP NEEDED FOR BATHING: A LITTLE
MOBILITY SCORE: 19
TOILETING: A LITTLE
WALKING IN HOSPITAL ROOM: A LITTLE
MOVING TO AND FROM BED TO CHAIR: A LITTLE
TURNING FROM BACK TO SIDE WHILE IN FLAT BAD: A LITTLE
MOVING FROM LYING ON BACK TO SITTING ON SIDE OF FLAT BED WITH BEDRAILS: A LITTLE
CLIMB 3 TO 5 STEPS WITH RAILING: A LITTLE
DRESSING REGULAR LOWER BODY CLOTHING: A LITTLE
DAILY ACTIVITIY SCORE: 21

## 2024-07-13 ASSESSMENT — PAIN - FUNCTIONAL ASSESSMENT: PAIN_FUNCTIONAL_ASSESSMENT: 0-10

## 2024-07-13 ASSESSMENT — PAIN SCALES - GENERAL: PAINLEVEL_OUTOF10: 0 - NO PAIN

## 2024-07-13 NOTE — DISCHARGE SUMMARY
Discharge Diagnosis  Left hip pain  Physical deconditioning     Issues Requiring Follow-Up  Hip pain, acute, left  Decreased mobility    Discharge Meds     Your medication list        CONTINUE taking these medications        Instructions Last Dose Given Next Dose Due   acetaminophen 500 mg tablet  Commonly known as: Tylenol           carvedilol 3.125 mg tablet  Commonly known as: Coreg      TAKE 1 TABLET TWICE DAILY WITH MEALS       cholecalciferol 50 MCG (2000 UT) tablet  Commonly known as: Vitamin D-3           clopidogrel 75 mg tablet  Commonly known as: Plavix      TAKE 1 TABLET ONE TIME DAILY       gabapentin 300 mg capsule  Commonly known as: Neurontin      TAKE 1 CAPSULE EVERY 6 HOURS       lubricating eye drops ophthalmic solution           nitroglycerin 0.4 mg SL tablet  Commonly known as: Nitrostat      PLACE 1 TABLET (0.4 MG) UNDER THE TONGUE EVERY 5 MINUTES AS NEEDED FOR CHEST PAIN       nystatin cream  Commonly known as: Mycostatin           rosuvastatin 10 mg tablet  Commonly known as: Crestor      TAKE 1 TABLET AT BEDTIME       sulfamethoxazole-trimethoprim 800-160 mg tablet  Commonly known as: Bactrim DS      Take 1 tablet by mouth 2 times a day for 10 days.              ASK your doctor about these medications        Instructions Last Dose Given Next Dose Due   Eliquis 5 mg tablet  Generic drug: apixaban      TAKE 1 TABLET EVERY 12 HOURS                Test Results Pending At Discharge  Pending Labs       No current pending labs.            Hospital Course  Andreia Camejo is a 89F with pmhx of  CKD IV, PE, Afib on chronic Eliquis and pacemaker, T2DM, degenerative disc disease, CAD admitted on 7/11/24 for intractable right hip pain. She was admitted for the same thing from 7/5-7/7/24, however she was able to walk and the pain was resolving while inpatient so opted to go home w/ home health rather than to SNF following that stay.  At home, she began experiencing the pain again and was unable to  ambulate, prompting her to return. CT femur, CT lumbar spine both negative for acute process. No evidence of infection, no leukocytosis, afebrile, no increased pain/swelling.      No fever, leukocytosis. Hemodynamically stable. Ortho was consulted and did not recommend any acute interventions at this time. Most likely muscle strain/tear vs trochanteric bursitis at location of ecchymosis on left upper leg. She was seen by PT/OT which recommended SNF placement. Due to patient being observation status, patient would have to private pay for SNF per care transitions. Patient/family agreeable to paying for short stay. Arrangements made with help from care transitions.    Patient discharged to SNF today.    Pertinent Physical Exam At Time of Discharge  Physical Exam  Vitals reviewed.   Constitutional:       Appearance: Normal appearance.   Cardiovascular:      Rate and Rhythm: Normal rate and regular rhythm.      Pulses: Normal pulses.      Heart sounds: Normal heart sounds.   Pulmonary:      Effort: Pulmonary effort is normal.      Breath sounds: Normal breath sounds.   Abdominal:      General: Abdomen is flat. Bowel sounds are normal.      Palpations: Abdomen is soft.   Neurological:      Mental Status: She is alert.         Outpatient Follow-Up  Future Appointments   Date Time Provider Department Center   7/16/2024  2:00 PM Jocelyne Belcher MD [id number]         Darrell Renee DO

## 2024-07-13 NOTE — PROGRESS NOTES
Patient: Andreia Camejo Age: 89 y.o.   Gender: female Room/bed: 221/221-A     Attending: Arias Lyman DO  Code Status:  DNR and No Intubation    Subjective  No OVN. Pain improved. Tolerating diet. No f/c, chest pain, dyspnea.     Objective:  Physical Exam  Vitals reviewed.   Constitutional:       Appearance: Normal appearance.   Cardiovascular:      Rate and Rhythm: Normal rate and regular rhythm.      Pulses: Normal pulses.      Heart sounds: Normal heart sounds.   Pulmonary:      Effort: Pulmonary effort is normal.      Breath sounds: Normal breath sounds.   Abdominal:      General: Abdomen is flat. Bowel sounds are normal.      Palpations: Abdomen is soft.   Neurological:      Mental Status: She is alert.          Temp:  [35.9 °C (96.6 °F)-36.2 °C (97.2 °F)] 35.9 °C (96.6 °F)  Heart Rate:  [60-65] 65  Resp:  [16-18] 16  BP: ()/(57-76) 131/72      Intake/Output Summary (Last 24 hours) at 7/13/2024 0901  Last data filed at 7/13/2024 0523  Gross per 24 hour   Intake 420 ml   Output --   Net 420 ml       Vitals:    07/11/24 1154   Weight: 84.4 kg (186 lb)             I/Os    Intake/Output Summary (Last 24 hours) at 7/13/2024 0901  Last data filed at 7/13/2024 0523  Gross per 24 hour   Intake 420 ml   Output --   Net 420 ml       Labs:   CBC:  Recent Labs     07/11/24  1442 07/07/24  0548 07/05/24  2359   WBC 7.8 13.4* 9.9   HGB 13.2 11.7* 12.9   HCT 40.1 36.1 38.8    162 150   * 102* 102*     CMP:  Recent Labs     07/11/24  1442 07/07/24  0548 07/06/24  0954    139 136   K 4.7 5.1 5.1    104 102   CO2 29 29 27   ANIONGAP 12 11 12   BUN 27* 39* 29*   CREATININE 1.29* 1.22* 1.21*   EGFR 39* 41* 42*   GLUCOSE 124* 222* 340*     Recent Labs     07/11/24  1442 07/07/24  0548 07/06/24  0954 07/06/24  0053   ALBUMIN 3.6 3.2* 3.6 3.5   ALKPHOS 61 60  --  48   ALT 29 77*  --  17   AST 20 55*  --  28   BILITOT 0.6 0.5  --  0.3     Calcium/Phos:   Lab Results   Component Value Date     "CALCIUM 8.8 07/11/2024    PHOS 3.8 07/07/2024      COAG:   Recent Labs     12/31/23  0943 08/27/23  2125 03/16/21  1607   INR 1.5* 1.4* 1.3*     CRP:   Lab Results   Component Value Date    CRP 0.40 07/11/2024      [unfilled]   ENDO:  Recent Labs     04/18/24  1120 11/15/23  1220 05/08/23  1146 07/28/21  0617 05/19/21  1219 10/17/19  0948   TSH 0.97  --   --   --  1.00 1.03   HGBA1C 8.1* 8.3* 8.8* 7.4  --   --       CARDIAC:   Recent Labs     07/05/24  2359 01/03/24  0655 01/02/24  0635 01/01/24  0817 12/31/23  1412 12/31/23  0943   TROPHS  --  12 16* 20*   < > 35*   *  --   --   --   --  322*    < > = values in this interval not displayed.     Recent Labs     08/28/23  0633 05/08/23  1123 05/25/22  1150   CHOL 90 99 98   LDLF 32 18 26   HDL 36.1* 46.7 49.5   TRIG 110 172* 111     No data recorded    Micro/ID:   Susceptibility data from last 90 days.  Collected Specimen Info Organism Amoxicillin/Clavulanate Ampicillin Ampicillin/Sulbactam Cefazolin Cefazolin (uncomplicated UTIs only) Ceftriaxone Ciprofloxacin Gentamicin Nitrofurantoin Piperacillin/Tazobactam   07/06/24 Urine from Clean Catch/Voided Klebsiella pneumoniae/variicola  S  R  S  I  S  S  S  S  S  S     Collected Specimen Info Organism Trimethoprim/Sulfamethoxazole   07/06/24 Urine from Clean Catch/Voided Klebsiella pneumoniae/variicola  S                    No lab exists for component: \"AGALPCRNB\"   .ID  Lab Results   Component Value Date    URINECULTURE >100,000 Klebsiella pneumoniae/variicola (A) 07/06/2024       Images:  CT lumbar spine wo IV contrast  Narrative: Interpreted By:  Hemal Bourne,   STUDY:  CT LUMBAR SPINE WO IV CONTRAST  7/11/2024 5:53 pm      INDICATION:  Signs/Symptoms:hip pain      COMPARISON:  CT lumbar spine 07/06/2014.      ACCESSION NUMBER(S):  [id number]      ORDERING CLINICIAN:  KUSH YANG      TECHNIQUE:  Axial CT images of the lumbar spine are obtained. Axial, coronal and  sagittal reconstructions are provided " for review.      FINDINGS:  Alignment:  Mild dextrocurvature of the lumbar spine. No subluxation.      Vertebrae/Disc Spaces:   There is mild chronic compression deformity  of the T12 vertebral body unchanged from at least 2021. The remainder  of the vertebral body heights are maintained. There is diffuse  degenerative disc space narrowing.      Lower Thoracic Spine:  There is no significant central canal stenosis  in the included lower thoracic region.      T12-L1:  There is no significant central canal stenosis.      L1-2: No significant central canal stenosis. Mild bilateral foraminal  stenosis.      L2-3: No significant central canal stenosis. Mild bilateral foraminal  stenosis.      L3-4:  Diffuse disc bulge and facet hypertrophy contributes to mild  central canal stenosis and mild subarticular stenosis.  Mild-to-moderate bilateral foraminal stenosis.      L4-5:  There is a right subarticular disc protrusion contributing to  significant right subarticular stenosis. Moderate to severe right  foraminal stenosis and mild left foraminal stenosis slightly  progressed from 2014. Mild central canal stenosis.      L5-S1:  Moderate bilateral foraminal stenosis due to facet  hypertrophy and posterolateral osseous spurring. No central canal  stenosis.      Prevertebral/Paraspinal Soft Tissues: The prevertebral and paraspinal  soft tissues are unremarkable.  There is a 1.9 cm left posterior  midpole renal cyst. There is an infrarenal IVC filter. Partially  visualized left hip arthroplasty.      Impression: Mild stable chronic compression deformity of T12. Lumbar vertebral  bodies are otherwise intact with diffuse degenerative disc space  narrowing similar to prior. There is osteopenia which limits  evaluation for nondisplaced fractures.      Slightly progressed facet arthropathy since 2014 with moderate to  severe right foraminal stenosis at L4-L5 and moderate bilateral  foraminal stenosis at L5-S1. No evidence of  high-grade central canal  stenosis.      MACRO:  None      Signed by: Hemal Bourne 7/11/2024 6:47 PM  Dictation workstation:   [id number]  CT femur left wo IV contrast  Narrative: Interpreted By:  Luis Armando Mcdonald,   STUDY:  CT FEMUR LEFT WO IV CONTRAST; ;  7/11/2024 3:04 pm      INDICATION:  Signs/Symptoms:unable to bear weight on LLE, ecchymosis to the left  lateral thigh history of total left hip arthroplasty..      COMPARISON:  CT pelvis of 07/06/2024 and left hip plain film examination of  07/05/2024.      ACCESSION NUMBER(S):  [id number]      ORDERING CLINICIAN:  HOMA YOUNG      TECHNIQUE:  Contiguous axial CT sections are performed through the left hip and  supplemented with coronal and sagittal reformatted images. The study  is limited by postoperative artifact.      FINDINGS:  The patient is status post total left hip arthroplasty with non  cemented acetabular component and cemented femoral component. The  implant appears well aligned. There is no dislocation. There is no CT  evidence of kareem-implant fracture or focal osteolysis.      There is arthrosis of the pubic symphysis with surrounding sclerosis  and marginal spurring. A component of periarticular erosion can not  be excluded. There is no widening of the joint.      The remaining osseous structures are intact. There is no bone  destruction or aggressive periosteal reaction. No lytic or blastic  lesions identified.      There is some ill-defined fluid and edema in the lateral subcutaneous  fat at the site of previous surgery and edema and ill-defined fluid  in the posteromedial subcutaneous fat of the proximal thigh with skin  thickening. Similar findings are identified in the contralateral side.      There are multiple diverticula in the distal colon. There is  enthesophyte formation at the gluteal tendon insertions at the  greater trochanter and some postsurgical heterotopic ossification  superior laterally. The remaining visualized  surrounding soft tissues  are unremarkable.      There is no intramuscular mass or fluid collection. There is no deep  fascial fluid or air. No space-occupying lesions identified in the  piriformis fossa. There are diffuse atherosclerotic arterial  calcifications.      Impression: Status post total left hip arthroplasty. There is no sign of  kareem-implant fracture or focal osteolysis.      Osteopenia.      The surrounding soft tissue structures are unremarkable.      MACRO:  None      Signed by: Luis Armando Mcdonald 7/11/2024 3:34 PM  Dictation workstation:   [id number]       Medications:  Scheduled medications  acetaminophen, 975 mg, oral, q6h  apixaban, 5 mg, oral, q12h  carvedilol, 3.125 mg, oral, BID  cholecalciferol, 2,000 Units, oral, Daily  clopidogrel, 75 mg, oral, Daily  gabapentin, 300 mg, oral, q8h  insulin lispro, 0-10 Units, subcutaneous, Before meals & nightly  lidocaine, 1 patch, transdermal, Daily  lubricating eye drops, 1 drop, Both Eyes, 4x daily  rosuvastatin, 10 mg, oral, Nightly      Continuous medications     PRN medications  PRN medications: dextrose, dextrose, glucagon, glucagon, nitroglycerin, oxyCODONE     Assessment and Plan:  Andreia Camejo is a 89 y.o. female presenting for L hip pain, decreased mobility.     #L Hip pain, likely muscular tear vs trochanteric bursitis   #decreased mobility   -CT femur and L spine wnl  -pain reg   -PT/OT      #asymptomatic bacteruria   -will not continue abx      Chronic medical conditions:   #CAD - clopidogrel, statin   #T2DM - SSI   # Atrial fibrillation - Eliquis, carvedilol,     Dvtppx: eliquis      Dispo: pending placement

## 2024-07-15 ENCOUNTER — NURSING HOME VISIT (OUTPATIENT)
Dept: POST ACUTE CARE | Facility: EXTERNAL LOCATION | Age: 89
End: 2024-07-15
Payer: MEDICARE

## 2024-07-15 ENCOUNTER — LAB REQUISITION (OUTPATIENT)
Dept: LAB | Facility: HOSPITAL | Age: 89
End: 2024-07-15
Payer: MEDICARE

## 2024-07-15 DIAGNOSIS — I25.10 ATHEROSCLEROSIS OF NATIVE CORONARY ARTERY, UNSPECIFIED WHETHER ANGINA PRESENT, UNSPECIFIED WHETHER NATIVE OR TRANSPLANTED HEART: ICD-10-CM

## 2024-07-15 DIAGNOSIS — I10 ESSENTIAL (PRIMARY) HYPERTENSION: ICD-10-CM

## 2024-07-15 DIAGNOSIS — E11.42 DIABETIC POLYNEUROPATHY ASSOCIATED WITH TYPE 2 DIABETES MELLITUS (MULTI): ICD-10-CM

## 2024-07-15 DIAGNOSIS — I50.9 HEART FAILURE, UNSPECIFIED (MULTI): ICD-10-CM

## 2024-07-15 LAB
ALBUMIN SERPL BCP-MCNC: 3.4 G/DL (ref 3.4–5)
ALP SERPL-CCNC: 64 U/L (ref 33–136)
ALT SERPL W P-5'-P-CCNC: 33 U/L (ref 7–45)
ANION GAP SERPL CALC-SCNC: 17 MMOL/L (ref 10–20)
AST SERPL W P-5'-P-CCNC: 37 U/L (ref 9–39)
BILIRUB SERPL-MCNC: 0.7 MG/DL (ref 0–1.2)
BUN SERPL-MCNC: 30 MG/DL (ref 6–23)
CALCIUM SERPL-MCNC: 8.5 MG/DL (ref 8.6–10.3)
CHLORIDE SERPL-SCNC: 103 MMOL/L (ref 98–107)
CO2 SERPL-SCNC: 24 MMOL/L (ref 21–32)
CREAT SERPL-MCNC: 1.45 MG/DL (ref 0.5–1.05)
EGFRCR SERPLBLD CKD-EPI 2021: 33 ML/MIN/1.73M*2
ERYTHROCYTE [DISTWIDTH] IN BLOOD BY AUTOMATED COUNT: 12.9 % (ref 11.5–14.5)
GLUCOSE SERPL-MCNC: 147 MG/DL (ref 74–99)
HCT VFR BLD AUTO: 38.2 % (ref 36–46)
HGB BLD-MCNC: 12.4 G/DL (ref 12–16)
MCH RBC QN AUTO: 33.6 PG (ref 26–34)
MCHC RBC AUTO-ENTMCNC: 32.5 G/DL (ref 32–36)
MCV RBC AUTO: 104 FL (ref 80–100)
NRBC BLD-RTO: 0 /100 WBCS (ref 0–0)
PLATELET # BLD AUTO: 152 X10*3/UL (ref 150–450)
POTASSIUM SERPL-SCNC: 4.8 MMOL/L (ref 3.5–5.3)
PROT SERPL-MCNC: 5.7 G/DL (ref 6.4–8.2)
RBC # BLD AUTO: 3.69 X10*6/UL (ref 4–5.2)
SODIUM SERPL-SCNC: 139 MMOL/L (ref 136–145)
WBC # BLD AUTO: 7.6 X10*3/UL (ref 4.4–11.3)

## 2024-07-15 PROCEDURE — 85027 COMPLETE CBC AUTOMATED: CPT | Mod: OUT | Performed by: FAMILY MEDICINE

## 2024-07-15 PROCEDURE — 99305 1ST NF CARE MODERATE MDM 35: CPT | Performed by: FAMILY MEDICINE

## 2024-07-15 PROCEDURE — 80053 COMPREHEN METABOLIC PANEL: CPT | Mod: OUT | Performed by: FAMILY MEDICINE

## 2024-07-15 NOTE — LETTER
Patient: Andreia Camejo  : 1929    Encounter Date: 07/15/2024    Andreia Camejo is a 89 y.o. female with Chief Complaint of SNF ([place]) H&P    Resident seen 7/15/24 -- MP    CC: Private pay ([place]) H&P    : 1929  Admit H&P done 22, 7/15/24  DC SUmmary dated 24 reviewed 7/15/24  Allergy: Red Dye, PCN, Flu vaccine, Lisinopril, Moxifloxacin, contrast dye, tape/adhesive  DNR-CC  PCP: TEO    S: 89 yo woman with DM, CAD, pAFIB, PPM, CKD4, admitted to self-pay rehab stay following hospitalization for left musculoskeletal hip pain. No fx or surgical indication. No CP/SOB. Med List & Problem list reviewed.    O: VSS AFEB 175# (down 17# from last SNF stay ) Awake, alert, NAD. MMM. Chest CTA. Heart rrr, PPM, 2/6 BRIANA. Ext no c/c/e.    A/P:  # Weakness/Hip pain-- part B PT/OT x 1 week with anticipate need to DC home with home PT/OT/SN for help with transition to home with homebound status. F/U with PCP/ORTHO.  # Weight loss -- general decline over past 2 years. Will monitor.  # CAD: statin, plavix, COREG 3.125 bid  # pAFIB: Eliquis. Rate controlled on PPM.  # DM w neuropathy: gabapentin 300 QID. Diet controlled.  # PE/DVT: IVC Filter. Life long eliquis 5 bid.    Past Surgical History:   Procedure Laterality Date   • APPENDECTOMY  2013    Appendectomy   • CARPAL TUNNEL RELEASE  2013    Neuroplasty Decompression Median Nerve At Carpal Tunnel   • CATARACT EXTRACTION  2013    Cataract Surgery   • CHOLECYSTECTOMY  2013    Cholecystectomy   • COLONOSCOPY  2013    Complete Colonoscopy   • CYSTOSCOPY  2013    Diagnostic Cystoscopy   • DILATION AND CURETTAGE OF UTERUS  2013    Dilation And Curettage   • MR HEAD ANGIO WO IV CONTRAST  2016    MR HEAD ANGIO WO IV CONTRAST 2016 [facility]   • MR NECK ANGIO WO IV CONTRAST  2016    MR NECK ANGIO WO IV CONTRAST 2016 [facility]   • OTHER SURGICAL HISTORY  2013     Neuroplasty With Transposition Of Ulnar Nerve   • OTHER SURGICAL HISTORY  05/22/2013    Excision Of Lesion Fingers   • OTHER SURGICAL HISTORY  05/22/2013    Osteotomy For Phalangeal Deformity Of Finger   • OTHER SURGICAL HISTORY  09/07/2013    Cath Placement Of Stent 3   • OTHER SURGICAL HISTORY  09/18/2013    Cardiac Cath Procedure Outcome: Successful   • OTHER SURGICAL HISTORY  08/14/2013    Cath Stent 1 Stenosis   • OTHER SURGICAL HISTORY  08/14/2013    Cath Stent 2 Initial   • TONSILLECTOMY  08/14/2013    Tonsillectomy   • TOTAL HIP ARTHROPLASTY  08/14/2013    Hip Replacement   • TUBAL LIGATION  08/14/2013    Tubal Ligation   • VARICOSE VEIN SURGERY  08/14/2013    Varicose Vein Ligation      Social History     Socioeconomic History   • Marital status:      Spouse name: Not on file   • Number of children: Not on file   • Years of education: Not on file   • Highest education level: Not on file   Occupational History   • Not on file   Tobacco Use   • Smoking status: Never   • Smokeless tobacco: Never   Substance and Sexual Activity   • Alcohol use: Never   • Drug use: Never   • Sexual activity: Not on file   Other Topics Concern   • Not on file   Social History Narrative   • Not on file     Social Determinants of Health     Financial Resource Strain: Low Risk  (7/11/2024)    Overall Financial Resource Strain (CARDIA)    • Difficulty of Paying Living Expenses: Not hard at all   Food Insecurity: Not on file   Transportation Needs: No Transportation Needs (7/11/2024)    PRAPARE - Transportation    • Lack of Transportation (Medical): No    • Lack of Transportation (Non-Medical): No   Physical Activity: Not on file   Stress: Not on file   Social Connections: Not on file   Intimate Partner Violence: Not on file   Housing Stability: Low Risk  (7/11/2024)    Housing Stability Vital Sign    • Unable to Pay for Housing in the Last Year: No    • Number of Times Moved in the Last Year: 0    • Homeless in the Last Year:  No     Past Medical History:   Diagnosis Date   • Acute cystitis without hematuria 05/10/2017    Acute cystitis without hematuria   • Allergic rhinitis due to pollen 08/18/2017    Acute seasonal allergic rhinitis due to pollen   • Body mass index (BMI) 28.0-28.9, adult 10/16/2018    BMI 28.0-28.9,adult   • Body mass index (BMI) 29.0-29.9, adult 05/14/2020    BMI 29.0-29.9,adult   • Cervicalgia 09/13/2013    Cervicalgia   • Chronic ischemic heart disease, unspecified 08/14/2013    Chronic myocardial ischemia   • Chronic kidney disease, stage 4 (severe) (Multi) 11/18/2020    CKD (chronic kidney disease), stage IV   • Flushing 08/28/2014    Flushing   • Hereditary and idiopathic neuropathy, unspecified 08/14/2013    Hereditary and idiopathic neuropathy   • Neurological symptoms 01/19/2024   • Other acute sinusitis 12/15/2020    Other acute sinusitis, recurrence not specified   • Other conditions influencing health status 03/26/2021    History of burning on urination   • Other pulmonary embolism without acute cor pulmonale (Multi) 05/25/2022    Multiple pulmonary emboli   • Other specified disorders of eustachian tube, right ear 10/04/2019    Dysfunction of right eustachian tube   • Personal history of other diseases of the digestive system 12/02/2014    History of constipation   • Personal history of other diseases of the musculoskeletal system and connective tissue 05/25/2022    History of polymyalgia rheumatica   • Personal history of other diseases of urinary system 08/14/2013    History of pyelonephritis   • Personal history of other specified conditions 06/12/2017    History of bruising easily   • Personal history of other specified conditions 06/28/2013    History of syncope   • Personal history of other specified conditions 01/04/2021    History of confusion   • Personal history of other specified conditions 08/28/2014    History of excessive sweating   • Personal history of other venous thrombosis and embolism  05/25/2022    History of deep venous thrombosis   • Presence of other cardiac implants and grafts 09/12/2017    Status post placement of implantable loop recorder   • Pyuria 01/04/2021    Pyuria   • Shingles 05/08/2023   • 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   • Suspected COVID-19 virus infection 05/08/2023   • Syncope and collapse 11/24/2016    Near syncope   • Urinary tract infection 05/08/2023   • Weakness 01/19/2024      No family history on file.     Review of Systems   Constitutional:  Negative for chills, fatigue and fever.   HENT:  Negative for rhinorrhea and sore throat.    Eyes:  Negative for pain and redness.   Respiratory:  Negative for cough and shortness of breath.    Cardiovascular:  Negative for chest pain and palpitations.   Gastrointestinal:  Negative for abdominal pain and blood in stool.   Endocrine: Negative for polydipsia and polyuria.   Genitourinary:  Negative for dysuria and hematuria.   Musculoskeletal:  Negative for back pain and neck stiffness.   Skin:  Negative for rash and wound.   Allergic/Immunologic: Negative for environmental allergies and food allergies.   Neurological:  Positive for weakness. Negative for headaches.   Hematological:  Negative for adenopathy. Does not bruise/bleed easily.   Psychiatric/Behavioral:  Negative for hallucinations and suicidal ideas.       There were no vitals taken for this visit.  Physical Exam  Vitals reviewed.   Constitutional:       General: She is not in acute distress.     Appearance: She is not ill-appearing.   HENT:      Head: Normocephalic and atraumatic.      Right Ear: Tympanic membrane normal.      Left Ear: Tympanic membrane normal.      Nose: No congestion or rhinorrhea.      Mouth/Throat:      Pharynx: No oropharyngeal exudate or posterior oropharyngeal erythema.   Eyes:      Extraocular Movements: Extraocular movements intact.      Conjunctiva/sclera: Conjunctivae normal.      Pupils:  "Pupils are equal, round, and reactive to light.   Cardiovascular:      Rate and Rhythm: Normal rate and regular rhythm.      Heart sounds: Murmur heard.      No friction rub. No gallop.      Comments: PPM  Pulmonary:      Effort: Pulmonary effort is normal.      Breath sounds: Normal breath sounds. No wheezing, rhonchi or rales.   Abdominal:      General: There is no distension.      Palpations: Abdomen is soft.      Tenderness: There is no abdominal tenderness. There is no guarding or rebound.   Musculoskeletal:         General: No swelling or deformity.      Cervical back: Normal range of motion and neck supple.      Right lower leg: No edema.      Left lower leg: No edema.   Skin:     Capillary Refill: Capillary refill takes less than 2 seconds.      Coloration: Skin is not jaundiced.      Findings: No rash.   Neurological:      General: No focal deficit present.      Mental Status: She is alert.      Motor: Weakness present.   Psychiatric:         Mood and Affect: Mood normal.         Behavior: Behavior normal.       Lab Results   Component Value Date    WBC 7.6 07/15/2024    HGB 12.4 07/15/2024    HCT 38.2 07/15/2024     (H) 07/15/2024     07/15/2024     Lab Results   Component Value Date    CHOL 90 08/28/2023    CHOL 99 05/08/2023    CHOL 98 05/25/2022     Lab Results   Component Value Date    HDL 36.1 (A) 08/28/2023    HDL 46.7 05/08/2023    HDL 49.5 05/25/2022     No results found for: \"LDLCALC\"  Lab Results   Component Value Date    TRIG 110 08/28/2023    TRIG 172 (H) 05/08/2023    TRIG 111 05/25/2022     No components found for: \"CHOLHDL\"  Lab Results   Component Value Date    HGBA1C 8.1 (H) 04/18/2024       Assessment/Plan  Problem List Items Addressed This Visit       Diabetic polyneuropathy associated with type 2 diabetes mellitus (Multi)    Atherosclerosis of native coronary artery       Electronically Signed By: Ranjit Bowens MD   7/22/24  9:57 PM  "

## 2024-07-16 ENCOUNTER — APPOINTMENT (OUTPATIENT)
Dept: PRIMARY CARE | Facility: CLINIC | Age: 89
End: 2024-07-16
Payer: MEDICARE

## 2024-07-17 ENCOUNTER — NURSING HOME VISIT (OUTPATIENT)
Dept: POST ACUTE CARE | Facility: EXTERNAL LOCATION | Age: 89
End: 2024-07-17
Payer: MEDICARE

## 2024-07-17 DIAGNOSIS — I10 ESSENTIAL HYPERTENSION: ICD-10-CM

## 2024-07-17 DIAGNOSIS — L30.4 INTERTRIGO: ICD-10-CM

## 2024-07-17 DIAGNOSIS — E78.5 DYSLIPIDEMIA: ICD-10-CM

## 2024-07-17 DIAGNOSIS — M70.61 TROCHANTERIC BURSITIS OF RIGHT HIP: ICD-10-CM

## 2024-07-17 DIAGNOSIS — I25.10 CORONARY ARTERY DISEASE INVOLVING NATIVE CORONARY ARTERY OF NATIVE HEART WITHOUT ANGINA PECTORIS: ICD-10-CM

## 2024-07-17 DIAGNOSIS — N30.00 ACUTE CYSTITIS WITHOUT HEMATURIA: ICD-10-CM

## 2024-07-17 DIAGNOSIS — E56.9 VITAMIN DEFICIENCY: ICD-10-CM

## 2024-07-17 DIAGNOSIS — M62.81 MUSCLE WEAKNESS (GENERALIZED): Primary | ICD-10-CM

## 2024-07-17 DIAGNOSIS — G62.89 OTHER POLYNEUROPATHY: ICD-10-CM

## 2024-07-17 DIAGNOSIS — M13.0 POLYARTHRITIS: ICD-10-CM

## 2024-07-17 DIAGNOSIS — I48.0 PAF (PAROXYSMAL ATRIAL FIBRILLATION) (MULTI): ICD-10-CM

## 2024-07-17 PROBLEM — E11.42 DIABETIC POLYNEUROPATHY ASSOCIATED WITH TYPE 2 DIABETES MELLITUS (MULTI): Status: ACTIVE | Noted: 2024-07-17

## 2024-07-17 PROCEDURE — 99310 SBSQ NF CARE HIGH MDM 45: CPT | Performed by: NURSE PRACTITIONER

## 2024-07-17 NOTE — PROGRESS NOTES
Andreia Camejo is a 89 y.o. female with Chief Complaint of SNF ([facility]) H&P    Resident seen 7/15/24 -- MP    CC: Private pay ([facility]) H&P    : 1929  Admit H&P done 22, 7/15/24  DC SUmmary dated 24 reviewed 7/15/24  Allergy: Red Dye, PCN, Flu vaccine, Lisinopril, Moxifloxacin, contrast dye, tape/adhesive  DNR-CC  PCP: TEO    S: 89 yo woman with DM, CAD, pAFIB, PPM, CKD4, admitted to self-pay rehab stay following hospitalization for left musculoskeletal hip pain. No fx or surgical indication. No CP/SOB. Med List & Problem list reviewed.    O: VSS AFEB 175# (down 17# from last SNF stay ) Awake, alert, NAD. MMM. Chest CTA. Heart rrr, PPM, 2/6 BRIANA. Ext no c/c/e.    A/P:  # Weakness/Hip pain-- part B PT/OT x 1 week with anticipate need to DC home with home PT/OT/SN for help with transition to home with homebound status. F/U with PCP/ORTHO.  # Weight loss -- general decline over past 2 years. Will monitor.  # CAD: statin, plavix, COREG 3.125 bid  # pAFIB: Eliquis. Rate controlled on PPM.  # DM w neuropathy: gabapentin 300 QID. Diet controlled.  # PE/DVT: IVC Filter. Life long eliquis 5 bid.    Past Surgical History:   Procedure Laterality Date    APPENDECTOMY  2013    Appendectomy    CARPAL TUNNEL RELEASE  2013    Neuroplasty Decompression Median Nerve At Carpal Tunnel    CATARACT EXTRACTION  2013    Cataract Surgery    CHOLECYSTECTOMY  2013    Cholecystectomy    COLONOSCOPY  2013    Complete Colonoscopy    CYSTOSCOPY  2013    Diagnostic Cystoscopy    DILATION AND CURETTAGE OF UTERUS  2013    Dilation And Curettage    MR HEAD ANGIO WO IV CONTRAST  2016    MR HEAD ANGIO WO IV CONTRAST 2016 [facility]    MR NECK ANGIO WO IV CONTRAST  2016    MR NECK ANGIO WO IV CONTRAST 2016 [facility]    OTHER SURGICAL HISTORY  2013    Neuroplasty With Transposition Of Ulnar Nerve    OTHER SURGICAL HISTORY  2013     Excision Of Lesion Fingers    OTHER SURGICAL HISTORY  05/22/2013    Osteotomy For Phalangeal Deformity Of Finger    OTHER SURGICAL HISTORY  09/07/2013    Cath Placement Of Stent 3    OTHER SURGICAL HISTORY  09/18/2013    Cardiac Cath Procedure Outcome: Successful    OTHER SURGICAL HISTORY  08/14/2013    Cath Stent 1 Stenosis    OTHER SURGICAL HISTORY  08/14/2013    Cath Stent 2 Initial    TONSILLECTOMY  08/14/2013    Tonsillectomy    TOTAL HIP ARTHROPLASTY  08/14/2013    Hip Replacement    TUBAL LIGATION  08/14/2013    Tubal Ligation    VARICOSE VEIN SURGERY  08/14/2013    Varicose Vein Ligation      Social History     Socioeconomic History    Marital status:      Spouse name: Not on file    Number of children: Not on file    Years of education: Not on file    Highest education level: Not on file   Occupational History    Not on file   Tobacco Use    Smoking status: Never    Smokeless tobacco: Never   Substance and Sexual Activity    Alcohol use: Never    Drug use: Never    Sexual activity: Not on file   Other Topics Concern    Not on file   Social History Narrative    Not on file     Social Determinants of Health     Financial Resource Strain: Low Risk  (7/11/2024)    Overall Financial Resource Strain (CARDIA)     Difficulty of Paying Living Expenses: Not hard at all   Food Insecurity: Not on file   Transportation Needs: No Transportation Needs (7/11/2024)    PRAPARE - Transportation     Lack of Transportation (Medical): No     Lack of Transportation (Non-Medical): No   Physical Activity: Not on file   Stress: Not on file   Social Connections: Not on file   Intimate Partner Violence: Not on file   Housing Stability: Low Risk  (7/11/2024)    Housing Stability Vital Sign     Unable to Pay for Housing in the Last Year: No     Number of Times Moved in the Last Year: 0     Homeless in the Last Year: No     Past Medical History:   Diagnosis Date    Acute cystitis without hematuria 05/10/2017    Acute cystitis  without hematuria    Allergic rhinitis due to pollen 08/18/2017    Acute seasonal allergic rhinitis due to pollen    Body mass index (BMI) 28.0-28.9, adult 10/16/2018    BMI 28.0-28.9,adult    Body mass index (BMI) 29.0-29.9, adult 05/14/2020    BMI 29.0-29.9,adult    Cervicalgia 09/13/2013    Cervicalgia    Chronic ischemic heart disease, unspecified 08/14/2013    Chronic myocardial ischemia    Chronic kidney disease, stage 4 (severe) (Multi) 11/18/2020    CKD (chronic kidney disease), stage IV    Flushing 08/28/2014    Flushing    Hereditary and idiopathic neuropathy, unspecified 08/14/2013    Hereditary and idiopathic neuropathy    Neurological symptoms 01/19/2024    Other acute sinusitis 12/15/2020    Other acute sinusitis, recurrence not specified    Other conditions influencing health status 03/26/2021    History of burning on urination    Other pulmonary embolism without acute cor pulmonale (Multi) 05/25/2022    Multiple pulmonary emboli    Other specified disorders of eustachian tube, right ear 10/04/2019    Dysfunction of right eustachian tube    Personal history of other diseases of the digestive system 12/02/2014    History of constipation    Personal history of other diseases of the musculoskeletal system and connective tissue 05/25/2022    History of polymyalgia rheumatica    Personal history of other diseases of urinary system 08/14/2013    History of pyelonephritis    Personal history of other specified conditions 06/12/2017    History of bruising easily    Personal history of other specified conditions 06/28/2013    History of syncope    Personal history of other specified conditions 01/04/2021    History of confusion    Personal history of other specified conditions 08/28/2014    History of excessive sweating    Personal history of other venous thrombosis and embolism 05/25/2022    History of deep venous thrombosis    Presence of other cardiac implants and grafts 09/12/2017    Status post placement  of implantable loop recorder    Pyuria 01/04/2021    Pyuria    Shingles 05/08/2023    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    Suspected COVID-19 virus infection 05/08/2023    Syncope and collapse 11/24/2016    Near syncope    Urinary tract infection 05/08/2023    Weakness 01/19/2024      No family history on file.     Review of Systems   Constitutional:  Negative for chills, fatigue and fever.   HENT:  Negative for rhinorrhea and sore throat.    Eyes:  Negative for pain and redness.   Respiratory:  Negative for cough and shortness of breath.    Cardiovascular:  Negative for chest pain and palpitations.   Gastrointestinal:  Negative for abdominal pain and blood in stool.   Endocrine: Negative for polydipsia and polyuria.   Genitourinary:  Negative for dysuria and hematuria.   Musculoskeletal:  Negative for back pain and neck stiffness.   Skin:  Negative for rash and wound.   Allergic/Immunologic: Negative for environmental allergies and food allergies.   Neurological:  Positive for weakness. Negative for headaches.   Hematological:  Negative for adenopathy. Does not bruise/bleed easily.   Psychiatric/Behavioral:  Negative for hallucinations and suicidal ideas.       There were no vitals taken for this visit.  Physical Exam  Vitals reviewed.   Constitutional:       General: She is not in acute distress.     Appearance: She is not ill-appearing.   HENT:      Head: Normocephalic and atraumatic.      Right Ear: Tympanic membrane normal.      Left Ear: Tympanic membrane normal.      Nose: No congestion or rhinorrhea.      Mouth/Throat:      Pharynx: No oropharyngeal exudate or posterior oropharyngeal erythema.   Eyes:      Extraocular Movements: Extraocular movements intact.      Conjunctiva/sclera: Conjunctivae normal.      Pupils: Pupils are equal, round, and reactive to light.   Cardiovascular:      Rate and Rhythm: Normal rate and regular rhythm.      Heart sounds:  "Murmur heard.      No friction rub. No gallop.      Comments: PPM  Pulmonary:      Effort: Pulmonary effort is normal.      Breath sounds: Normal breath sounds. No wheezing, rhonchi or rales.   Abdominal:      General: There is no distension.      Palpations: Abdomen is soft.      Tenderness: There is no abdominal tenderness. There is no guarding or rebound.   Musculoskeletal:         General: No swelling or deformity.      Cervical back: Normal range of motion and neck supple.      Right lower leg: No edema.      Left lower leg: No edema.   Skin:     Capillary Refill: Capillary refill takes less than 2 seconds.      Coloration: Skin is not jaundiced.      Findings: No rash.   Neurological:      General: No focal deficit present.      Mental Status: She is alert.      Motor: Weakness present.   Psychiatric:         Mood and Affect: Mood normal.         Behavior: Behavior normal.       Lab Results   Component Value Date    WBC 7.6 07/15/2024    HGB 12.4 07/15/2024    HCT 38.2 07/15/2024     (H) 07/15/2024     07/15/2024     Lab Results   Component Value Date    CHOL 90 08/28/2023    CHOL 99 05/08/2023    CHOL 98 05/25/2022     Lab Results   Component Value Date    HDL 36.1 (A) 08/28/2023    HDL 46.7 05/08/2023    HDL 49.5 05/25/2022     No results found for: \"LDLCALC\"  Lab Results   Component Value Date    TRIG 110 08/28/2023    TRIG 172 (H) 05/08/2023    TRIG 111 05/25/2022     No components found for: \"CHOLHDL\"  Lab Results   Component Value Date    HGBA1C 8.1 (H) 04/18/2024       Assessment/Plan   Problem List Items Addressed This Visit       Diabetic polyneuropathy associated with type 2 diabetes mellitus (Multi)    Atherosclerosis of native coronary artery       "

## 2024-07-18 ENCOUNTER — APPOINTMENT (OUTPATIENT)
Dept: PRIMARY CARE | Facility: CLINIC | Age: 89
End: 2024-07-18
Payer: MEDICARE

## 2024-07-20 LAB
ATRIAL RATE: 60 BPM
P AXIS: -14 DEGREES
PR INTERVAL: 334 MS
Q ONSET: 213 MS
QRS COUNT: 10 BEATS
QRS DURATION: 110 MS
QT INTERVAL: 446 MS
QTC CALCULATION(BAZETT): 446 MS
QTC FREDERICIA: 446 MS
R AXIS: -27 DEGREES
T AXIS: -17 DEGREES
T OFFSET: 436 MS
VENTRICULAR RATE: 60 BPM

## 2024-07-21 NOTE — PROGRESS NOTES
*Provider Impression*  Patient is a 89 year old female who is seen today for management of multiple medical problems  #Weakness / Trochanteric bursitis - PT/OT  #UTI - Bactrim DS BID until 7/23  #OA / Neuropathy - acetaminophen 1000mg BID, gabapentin 300mg q6h  #PAF / HTN / CAD / HLD - atorvastatin 20mg QHS, plavix 75mg daily, carvedilol 3.125mg BID, eliquis 5mg BID, nitro PRN  #Vitamin deficiency - vitamin D 2000units daily  #Intertrigo - nystatin cream BID  #ACP - DNRCC  Follow up as needed      *Chief Complaint*  weakness, UTI     *History of Present Illness*  Pt is a 89 y/o female w/ PMH as below who was admitted on 7/11/24 for intractable right hip pain. She had been admitted for the same thing from 7/5-7/7/24, however she was able to walk and the pain was resolving while inpatient so opted to go home w/ home health rather than to SNF following that stay.  At home, she began experiencing the pain again and was unable to ambulate, prompting her to return. CT femur, CT lumbar spine both negative for acute process. No evidence of infection, no leukocytosis, afebrile, no increased pain/swelling. No fever, leukocytosis. Hemodynamically stable. Ortho was consulted and did not recommend any acute interventions at this time. Most likely muscle strain/tear vs trochanteric bursitis at location of ecchymosis on left upper leg. She was seen by PT/OT which recommended SNF placement. She was d/c to [facility] on 7/13.    Her labs appreciated as below.     She is seen sitting up in her room today and denies any pain at all, just feels tired, no numbness tignling, paresthesias, weakness, CP, SOB, cough, n/v, constipation, diarrhea, LUTS, or any other new c/o presently.      Allergies - Dye, Red, Influenza Virus Vaccine, Lisinopril, Moxifloxacin, Penicillin, Contrast Dye, seasonal allegies, tape adhesive   PMH - Allergic rhinitis, anemia, HTN, CAD, DJD, GERD, DVT, PMR, mitral regurgitation, pulmonary emboli, PAF, OA,  shingles, TIA, CKD, PE, pyelonephritis, syncope  PSH - appendectomy, cardiac cath procedure, cataract surgery, stent placement, cholecystectomy, colonoscopy, cystoscopy, D&C, L hip replacement, neuroplasty decompression, osteotomy, tonsillectomy, tubal ligation, varicose vein ligation, pacemaker  FH - OA, T2DM, COPD, hypothyroidism, nephrolithiasis, pancreatic cancer, heart disease, ovarian cancer, Lipschutz's ulcer  SocHx - never smoker, No EtOH      *Review of Systems*  All other systems reviewed are negative except as noted in the HPI     *Vitals*  Date: 7/16/24 - T: 97.3 P: 60 R: 18 BP: 124/64 SpO2: 96% on RA      *Results/Data*  CBC - Date: 7/15/24 WBC: 7.6 HGB: 12.4 HCT: 38.2 PLT: 152 ;   BMP - Date: 7/15/24 Na: 139 K: 4.8 Cl: 103 CO2: 24 BUN: 24 Cr: 1.45 Glu: 147 Ca: 8.5 Alb: 3.4 ;   LFT - Date: 7/15/24 AST: 37  ALT: 33  ALP: 64  Tbili: 0.7;   Coags - Date: INR: PT:    *Physical Exam*  Gen: (+) NAD, (+) well-appearing  HEENT: (+) normocephalic, (+) MMM  Neck: (+) supple  Lungs: (+) CTAB, (-) wheezes, (-) rales, (-) rhonchi  Heart: (+) RRR, (+) S1 S2, (-) murmurs  Pulses: (+) palpable  Abd: (+) soft, (+) NT, (+) ND, (+) BS+  Ext: (-) edema, (-) deformity  MSK: (-) joint swelling  Skin: (+) warm, (+) dry, (-) rash  Neuro: (+) follows commands, (-) tremor, (+) alert

## 2024-07-22 ASSESSMENT — ENCOUNTER SYMPTOMS
SHORTNESS OF BREATH: 0
WOUND: 0
FEVER: 0
EYE REDNESS: 0
CHILLS: 0
ADENOPATHY: 0
SORE THROAT: 0
ABDOMINAL PAIN: 0
BRUISES/BLEEDS EASILY: 0
COUGH: 0
FATIGUE: 0
HEMATURIA: 0
RHINORRHEA: 0
EYE PAIN: 0
WEAKNESS: 1
POLYDIPSIA: 0
HALLUCINATIONS: 0
BACK PAIN: 0
HEADACHES: 0
BLOOD IN STOOL: 0
DYSURIA: 0
NECK STIFFNESS: 0
PALPITATIONS: 0

## 2024-07-23 ENCOUNTER — DOCUMENTATION (OUTPATIENT)
Dept: PRIMARY CARE | Facility: CLINIC | Age: 89
End: 2024-07-23
Payer: MEDICARE

## 2024-07-23 DIAGNOSIS — G60.9 IDIOPATHIC PERIPHERAL NEUROPATHY: ICD-10-CM

## 2024-07-23 DIAGNOSIS — M54.12 CERVICAL RADICULAR PAIN: ICD-10-CM

## 2024-07-23 RX ORDER — GABAPENTIN 300 MG/1
CAPSULE ORAL
Qty: 360 CAPSULE | Refills: 3 | Status: SHIPPED | OUTPATIENT
Start: 2024-07-23

## 2024-07-24 ENCOUNTER — PATIENT OUTREACH (OUTPATIENT)
Dept: PRIMARY CARE | Facility: CLINIC | Age: 89
End: 2024-07-24
Payer: MEDICARE

## 2024-07-24 DIAGNOSIS — M25.552 HIP PAIN, ACUTE, LEFT: ICD-10-CM

## 2024-07-24 DIAGNOSIS — Z09 HOSPITAL DISCHARGE FOLLOW-UP: ICD-10-CM

## 2024-07-24 NOTE — PROGRESS NOTES
TCM outreach completed   Patient is scheduled within 14 days of discharge on (7/30/24 ) for hospital follow up, however was unable to reach patient during two business days after discharge despite at least two attempts made. Detailed message left providing call back phone number- no return call as of this time.      Moderately complex & follow-up within 14 days- bill [id number] for hospital follow up.   Highly complex and follow-up visit within 7 days-can bill [id number] for hospital follow up    * Virtual follow up needs modifier added (95 or GT)  AWV AND TCM CAN BE BILLED TOGETHER WITH 25 MODIFIER       Discharge Facility: [facility]  Discharge Diagnosis: Left hip pain, Physical deconditioning   Admission Date: 7/11/24  Discharge Date: 7/13/24  SNF- [facility] from 7/13/24- 7/20/24  Hospital Encounter and Summary Linked: Yes    Issues Requiring Follow-Up:  Hip pain, acute, left  Decreased mobility

## 2024-07-30 ENCOUNTER — APPOINTMENT (OUTPATIENT)
Dept: PRIMARY CARE | Facility: CLINIC | Age: 89
End: 2024-07-30
Payer: MEDICARE

## 2024-08-08 ENCOUNTER — PATIENT OUTREACH (OUTPATIENT)
Dept: PRIMARY CARE | Facility: CLINIC | Age: 89
End: 2024-08-08
Payer: MEDICARE

## 2024-08-08 NOTE — PROGRESS NOTES
Unable to reach patient for call back 14 days post discharge from the hospital. If no voicemail available call attempts x 2 were made to contact the patient to assist with any questions or concerns patient may have. Patient did not follow up with their PCP within that time. Patient dis-enrolled from [facility] this date.

## 2025-01-23 ENCOUNTER — APPOINTMENT (OUTPATIENT)
Dept: RADIOLOGY | Facility: HOSPITAL | Age: OVER 89
End: 2025-01-23
Payer: MEDICARE

## 2025-01-23 ENCOUNTER — APPOINTMENT (OUTPATIENT)
Dept: CARDIOLOGY | Facility: HOSPITAL | Age: OVER 89
End: 2025-01-23
Payer: MEDICARE

## 2025-01-23 ENCOUNTER — HOSPITAL ENCOUNTER (EMERGENCY)
Facility: HOSPITAL | Age: OVER 89
Discharge: HOME | End: 2025-01-23
Attending: STUDENT IN AN ORGANIZED HEALTH CARE EDUCATION/TRAINING PROGRAM
Payer: MEDICARE

## 2025-01-23 VITALS
BODY MASS INDEX: 28.49 KG/M2 | HEART RATE: 77 BPM | DIASTOLIC BLOOD PRESSURE: 97 MMHG | WEIGHT: 188 LBS | OXYGEN SATURATION: 97 % | SYSTOLIC BLOOD PRESSURE: 133 MMHG | TEMPERATURE: 97.3 F | HEIGHT: 68 IN | RESPIRATION RATE: 15 BRPM

## 2025-01-23 DIAGNOSIS — S22.31XA CLOSED FRACTURE OF ONE RIB OF RIGHT SIDE, INITIAL ENCOUNTER: ICD-10-CM
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
## 2025-01-23 LAB
ALBUMIN SERPL BCP-MCNC: 4 G/DL (ref 3.4–5)
ALP SERPL-CCNC: 62 U/L (ref 33–136)
ALT SERPL W P-5'-P-CCNC: 30 U/L (ref 7–45)
ANION GAP SERPL CALC-SCNC: 11 MMOL/L (ref 10–20)
APTT PPP: 34 SECONDS (ref 27–38)
AST SERPL W P-5'-P-CCNC: 29 U/L (ref 9–39)
BASOPHILS # BLD AUTO: 0.04 X10*3/UL (ref 0–0.1)
BASOPHILS NFR BLD AUTO: 0.6 %
BILIRUB SERPL-MCNC: 0.6 MG/DL (ref 0–1.2)
BNP SERPL-MCNC: 364 PG/ML (ref 0–99)
BUN SERPL-MCNC: 25 MG/DL (ref 6–23)
CALCIUM SERPL-MCNC: 9.4 MG/DL (ref 8.6–10.3)
CHLORIDE SERPL-SCNC: 103 MMOL/L (ref 98–107)
CK SERPL-CCNC: 45 U/L (ref 0–215)
CO2 SERPL-SCNC: 28 MMOL/L (ref 21–32)
CREAT SERPL-MCNC: 1.08 MG/DL (ref 0.5–1.05)
EGFRCR SERPLBLD CKD-EPI 2021: 47 ML/MIN/1.73M*2
EOSINOPHIL # BLD AUTO: 0.12 X10*3/UL (ref 0–0.4)
EOSINOPHIL NFR BLD AUTO: 1.7 %
ERYTHROCYTE [DISTWIDTH] IN BLOOD BY AUTOMATED COUNT: 12.8 % (ref 11.5–14.5)
GLUCOSE SERPL-MCNC: 135 MG/DL (ref 74–99)
HCT VFR BLD AUTO: 44.4 % (ref 36–46)
HGB BLD-MCNC: 14.8 G/DL (ref 12–16)
IMM GRANULOCYTES # BLD AUTO: 0.04 X10*3/UL (ref 0–0.5)
IMM GRANULOCYTES NFR BLD AUTO: 0.6 % (ref 0–0.9)
INR PPP: 1.5 (ref 0.9–1.1)
LYMPHOCYTES # BLD AUTO: 1.55 X10*3/UL (ref 0.8–3)
LYMPHOCYTES NFR BLD AUTO: 22.5 %
MAGNESIUM SERPL-MCNC: 1.74 MG/DL (ref 1.6–2.4)
MCH RBC QN AUTO: 33.6 PG (ref 26–34)
MCHC RBC AUTO-ENTMCNC: 33.3 G/DL (ref 32–36)
MCV RBC AUTO: 101 FL (ref 80–100)
MONOCYTES # BLD AUTO: 0.72 X10*3/UL (ref 0.05–0.8)
MONOCYTES NFR BLD AUTO: 10.5 %
NEUTROPHILS # BLD AUTO: 4.41 X10*3/UL (ref 1.6–5.5)
NEUTROPHILS NFR BLD AUTO: 64.1 %
NRBC BLD-RTO: 0 /100 WBCS (ref 0–0)
PLATELET # BLD AUTO: 155 X10*3/UL (ref 150–450)
POTASSIUM SERPL-SCNC: 4.4 MMOL/L (ref 3.5–5.3)
PROT SERPL-MCNC: 7.3 G/DL (ref 6.4–8.2)
PROTHROMBIN TIME: 16.7 SECONDS (ref 9.8–12.8)
RBC # BLD AUTO: 4.41 X10*6/UL (ref 4–5.2)
SODIUM SERPL-SCNC: 138 MMOL/L (ref 136–145)
WBC # BLD AUTO: 6.9 X10*3/UL (ref 4.4–11.3)

## 2025-01-23 PROCEDURE — 85610 PROTHROMBIN TIME: CPT

## 2025-01-23 PROCEDURE — 71250 CT THORAX DX C-: CPT

## 2025-01-23 PROCEDURE — 72125 CT NECK SPINE W/O DYE: CPT

## 2025-01-23 PROCEDURE — 99285 EMERGENCY DEPT VISIT HI MDM: CPT | Mod: 25 | Performed by: STUDENT IN AN ORGANIZED HEALTH CARE EDUCATION/TRAINING PROGRAM

## 2025-01-23 PROCEDURE — 93005 ELECTROCARDIOGRAM TRACING: CPT

## 2025-01-23 PROCEDURE — 82550 ASSAY OF CK (CPK): CPT

## 2025-01-23 PROCEDURE — 2500000001 HC RX 250 WO HCPCS SELF ADMINISTERED DRUGS (ALT 637 FOR MEDICARE OP)

## 2025-01-23 PROCEDURE — 70450 CT HEAD/BRAIN W/O DYE: CPT | Performed by: RADIOLOGY

## 2025-01-23 PROCEDURE — 72125 CT NECK SPINE W/O DYE: CPT | Performed by: RADIOLOGY

## 2025-01-23 PROCEDURE — 70450 CT HEAD/BRAIN W/O DYE: CPT

## 2025-01-23 PROCEDURE — 2500000005 HC RX 250 GENERAL PHARMACY W/O HCPCS: Performed by: STUDENT IN AN ORGANIZED HEALTH CARE EDUCATION/TRAINING PROGRAM

## 2025-01-23 PROCEDURE — 85730 THROMBOPLASTIN TIME PARTIAL: CPT

## 2025-01-23 PROCEDURE — 83880 ASSAY OF NATRIURETIC PEPTIDE: CPT

## 2025-01-23 PROCEDURE — G0390 TRAUMA RESPONS W/HOSP CRITI: HCPCS

## 2025-01-23 PROCEDURE — 36415 COLL VENOUS BLD VENIPUNCTURE: CPT

## 2025-01-23 PROCEDURE — 80053 COMPREHEN METABOLIC PANEL: CPT

## 2025-01-23 PROCEDURE — 85025 COMPLETE CBC W/AUTO DIFF WBC: CPT

## 2025-01-23 PROCEDURE — 83735 ASSAY OF MAGNESIUM: CPT

## 2025-01-23 RX ORDER — ACETAMINOPHEN 325 MG/1
975 TABLET ORAL ONCE
Status: COMPLETED | OUTPATIENT
Start: 2025-01-23 | End: 2025-01-23

## 2025-01-23 RX ORDER — LIDOCAINE 560 MG/1
1 PATCH PERCUTANEOUS; TOPICAL; TRANSDERMAL DAILY
Status: DISCONTINUED | OUTPATIENT
Start: 2025-01-23 | End: 2025-01-24 | Stop reason: HOSPADM

## 2025-01-23 RX ORDER — LIDOCAINE 560 MG/1
1 PATCH PERCUTANEOUS; TOPICAL; TRANSDERMAL DAILY
Qty: 14 PATCH | Refills: 0 | Status: SHIPPED | OUTPATIENT
Start: 2025-01-23

## 2025-01-23 RX ORDER — LIDOCAINE 560 MG/1
1 PATCH PERCUTANEOUS; TOPICAL; TRANSDERMAL DAILY
Status: DISCONTINUED | OUTPATIENT
Start: 2025-01-24 | End: 2025-01-23

## 2025-01-23 RX ORDER — HYDROCODONE BITARTRATE AND ACETAMINOPHEN 5; 325 MG/1; MG/1
1 TABLET ORAL ONCE
Status: COMPLETED | OUTPATIENT
Start: 2025-01-23 | End: 2025-01-23

## 2025-01-23 RX ADMIN — HYDROCODONE BITARTRATE AND ACETAMINOPHEN 1 TABLET: 5; 325 TABLET ORAL at 23:32

## 2025-01-23 RX ADMIN — ACETAMINOPHEN 975 MG: 325 TABLET, FILM COATED ORAL at 22:47

## 2025-01-23 RX ADMIN — LIDOCAINE 4% 1 PATCH: 40 PATCH TOPICAL at 22:00

## 2025-01-23 ASSESSMENT — LIFESTYLE VARIABLES
HAVE PEOPLE ANNOYED YOU BY CRITICIZING YOUR DRINKING: NO
EVER FELT BAD OR GUILTY ABOUT YOUR DRINKING: NO
TOTAL SCORE: 0
EVER HAD A DRINK FIRST THING IN THE MORNING TO STEADY YOUR NERVES TO GET RID OF A HANGOVER: NO
HAVE YOU EVER FELT YOU SHOULD CUT DOWN ON YOUR DRINKING: NO

## 2025-01-23 ASSESSMENT — COLUMBIA-SUICIDE SEVERITY RATING SCALE - C-SSRS
2. HAVE YOU ACTUALLY HAD ANY THOUGHTS OF KILLING YOURSELF?: NO
6. HAVE YOU EVER DONE ANYTHING, STARTED TO DO ANYTHING, OR PREPARED TO DO ANYTHING TO END YOUR LIFE?: NO
1. IN THE PAST MONTH, HAVE YOU WISHED YOU WERE DEAD OR WISHED YOU COULD GO TO SLEEP AND NOT WAKE UP?: NO

## 2025-01-23 ASSESSMENT — PAIN DESCRIPTION - LOCATION: LOCATION: RIB CAGE

## 2025-01-23 ASSESSMENT — PAIN SCALES - GENERAL: PAINLEVEL_OUTOF10: 8

## 2025-01-23 ASSESSMENT — PAIN DESCRIPTION - PAIN TYPE: TYPE: ACUTE PAIN

## 2025-01-23 ASSESSMENT — PAIN - FUNCTIONAL ASSESSMENT: PAIN_FUNCTIONAL_ASSESSMENT: 0-10

## 2025-01-23 ASSESSMENT — PAIN DESCRIPTION - ORIENTATION: ORIENTATION: RIGHT

## 2025-01-24 LAB
ATRIAL RATE: 85 BPM
P AXIS: 64 DEGREES
P OFFSET: 117 MS
P ONSET: 82 MS
PR INTERVAL: 282 MS
Q ONSET: 223 MS
QRS COUNT: 14 BEATS
QRS DURATION: 102 MS
QT INTERVAL: 380 MS
QTC CALCULATION(BAZETT): 452 MS
QTC FREDERICIA: 426 MS
R AXIS: -31 DEGREES
T AXIS: 60 DEGREES
T OFFSET: 413 MS
VENTRICULAR RATE: 85 BPM

## 2025-01-24 NOTE — ED PROVIDER NOTES
History of Present Illness     History provided by: Patient and Family Member  Limitations to History: None  External Records Reviewed with Brief Summary: Discharge Summary from PCP which showed pertinent past medical history.    HPI:  Andreia Camejo is a 89 y.o. female with a history of CKD, PE, atrial fibrillation on apixaban, pacemaker, DM2, CAD. She presents with mechanical fall at home, head strike, new chest and flank pain. She states that she tripped on a rug at home and fell forward, striking her right chest on a table corner. The patient was on the ground for 30-60 minutes before her son found her and called the ambulance. She arrived as an HIA. Moving all extremities, conversant, and A/Ox3.    Physical Exam   Triage vitals:  T 36.3 °C (97.3 °F)  HR 85  BP (!) 147/106  RR 16  O2 95 % None (Room air)    Physical Exam  Constitutional:       General: She is in acute distress.      Appearance: She is obese. She is not ill-appearing.   HENT:      Head: Normocephalic and atraumatic.      Nose: Nose normal.      Mouth/Throat:      Mouth: Mucous membranes are moist.      Pharynx: Oropharynx is clear.   Eyes:      Extraocular Movements: Extraocular movements intact.      Comments: Anisocoria R>L, right pupil not round, has small defect toward midline.   Cardiovascular:      Rate and Rhythm: Normal rate. Rhythm irregular.      Pulses: Normal pulses.      Heart sounds: Normal heart sounds.   Pulmonary:      Effort: No respiratory distress.      Breath sounds: No wheezing, rhonchi or rales.      Comments: Diminished air sounds, breathing limited by pain  Abdominal:      General: Abdomen is flat. Bowel sounds are normal. There is no distension.      Palpations: Abdomen is soft.      Tenderness: There is no abdominal tenderness. There is no guarding.      Hernia: No hernia is present.   Musculoskeletal:         General: Tenderness present. Normal range of motion.      Cervical back: No rigidity or tenderness.       Right lower leg: No edema.      Left lower leg: No edema.      Comments: R chest tenderness to palpation around rib 8-9.   Skin:     General: Skin is warm and dry.      Capillary Refill: Capillary refill takes less than 2 seconds.   Neurological:      General: No focal deficit present.      Mental Status: She is alert and oriented to person, place, and time. Mental status is at baseline.        Medical Decision Making & ED Course   Medical Decision Makin y.o. female CKD, PE, atrial fibrillation, pacer, DM2, CAD presenting with mechanical fall on thinners. Imaging consistent with rib fracture. No evidence of intra-abdominal or retroperitoneal bleeding, no evidence of soft tissue or internal organ damage. No evidence of intracranial bleeding or skull fracture. Pain is likely secondary to acute fracture and fall, fall is not consistent with neurologic or cardiogenic etiologies.  ----  Scoring Tools Utilized: None    Differential diagnoses considered include but are not limited to: Rib fracture, ACS, heart failure exacerbation, pnemothorax     Social Determinants of Health which Significantly Impact Care: None identified The following actions were taken to address these social determinants: None    EKG Independent Interpretation: EKG interpreted by myself. Please see ED Course for full interpretation.    Independent Result Review and Interpretation: Relevant laboratory and radiographic results were reviewed and independently interpreted by myself.  As necessary, they are commented on in the ED Course.    Chronic conditions affecting the patient's care: As documented above in [facility]    The patient was discussed with the following consultants/services: None    Care Considerations: As documented above in [facility]    ED Course:  Labs -   Labs Reviewed   CBC WITH AUTO DIFFERENTIAL - Abnormal       Result Value    WBC 6.9      nRBC 0.0      RBC 4.41      Hemoglobin 14.8      Hematocrit 44.4       (*)     MCH 33.6       MCHC 33.3      RDW 12.8      Platelets 155      Neutrophils % 64.1      Immature Granulocytes %, Automated 0.6      Lymphocytes % 22.5      Monocytes % 10.5      Eosinophils % 1.7      Basophils % 0.6      Neutrophils Absolute 4.41      Immature Granulocytes Absolute, Automated 0.04      Lymphocytes Absolute 1.55      Monocytes Absolute 0.72      Eosinophils Absolute 0.12      Basophils Absolute 0.04     COMPREHENSIVE METABOLIC PANEL - Abnormal    Glucose 135 (*)     Sodium 138      Potassium 4.4      Chloride 103      Bicarbonate 28      Anion Gap 11      Urea Nitrogen 25 (*)     Creatinine 1.08 (*)     eGFR 47 (*)     Calcium 9.4      Albumin 4.0      Alkaline Phosphatase 62      Total Protein 7.3      AST 29      Bilirubin, Total 0.6      ALT 30     PROTIME-INR - Abnormal    Protime 16.7 (*)     INR 1.5 (*)    B-TYPE NATRIURETIC PEPTIDE - Abnormal     (*)     Narrative:        <100 pg/mL - Heart failure unlikely  100-299 pg/mL - Intermediate probability of acute heart                  failure exacerbation. Correlate with clinical                  context and patient history.    >=300 pg/mL - Heart Failure likely. Correlate with clinical                  context and patient history.    BNP testing is performed using different testing methodology at [facility] than at other [facility]. Direct result comparisons should only be made within the same method.      MAGNESIUM - Normal    Magnesium 1.74     APTT - Normal    aPTT 34      Narrative:     The APTT is no longer used for monitoring Unfractionated Heparin Therapy. For monitoring Heparin Therapy, use the Heparin Assay.   CREATINE KINASE - Normal    Creatine Kinase 45       Imaging -   CT chest abdomen pelvis wo IV contrast   Final Result   Addendum (preliminary) 1 of 1   There is a mildly displaced lateral right seventh rib fracture.  No   evidence of surrounding fluid collections suggest a soft tissue or   extrapleural hematoma.  No  pneumothorax.   Results were discussed with Dr. Willard Yoo on 1/23/2025 at 2233.   Signed by Dl Spring MD      Final   1.  Cardiomegaly as described above with coronary artery   calcifications.   2.  Mild bibasilar atelectasis.  No other evidence of significant   focal pulmonary consolidation, pleural effusions or pneumothorax.   3.  Colonic diverticulosis without definite CT evidence of   diverticulitis.   4.  No intra-abdominal/pelvic fluid collections or pneumoperitoneum.   5.  No definite renal/ureter calculi or hydronephrosis bilaterally.   6.  Other findings as stated above.   Signed by Dl Spring MD      CT head wo IV contrast   Final Result   No acute intracranial hemorrhage or mass effect.        No acute fracture or traumatic subluxation of the cervical spine.        Degenerative changes similar to prior imaging.        MACRO:   None.        Signed by: Ivy Schneider 1/23/2025 8:39 PM   Dictation workstation:   [id number]      CT cervical spine wo IV contrast   Final Result   No acute intracranial hemorrhage or mass effect.        No acute fracture or traumatic subluxation of the cervical spine.        Degenerative changes similar to prior imaging.        MACRO:   None.        Signed by: Ivy Schneider 1/23/2025 8:39 PM   Dictation workstation:   [id number]         Interventions -   Medications   lidocaine 4 % patch 1 patch (1 patch transdermal Medication Applied 1/23/25 2200)   HYDROcodone-acetaminophen (Norco) 5-325 mg per tablet 1 tablet (has no administration in time range)   acetaminophen (Tylenol) tablet 975 mg (975 mg oral Given 1/23/25 2247)     Disposition   Discharge home with lidocaine patch, incentive spirometer, and follow up with PCP.    Patient seen and discussed with ED attending physician.      Marlon Rick MD  Resident  01/23/25 [id number]

## 2025-01-24 NOTE — ED TRIAGE NOTES
Pt here for fall today after tripping, happened around 6pm tonight and found on the ground about 30 min later. C/o R side/rib pain. Did hit her head and is on reportedly plavix and eliquis. Denies loc. Placed in c collar by ems pta. Pt denies neck or back pain.

## 2025-01-31 ENCOUNTER — APPOINTMENT (OUTPATIENT)
Dept: PRIMARY CARE | Facility: CLINIC | Age: OVER 89
End: 2025-01-31
Payer: MEDICARE
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
## 2025-01-31 DIAGNOSIS — S09.90XA INJURY OF HEAD, INITIAL ENCOUNTER: ICD-10-CM

## 2025-01-31 DIAGNOSIS — S22.31XA CLOSED FRACTURE OF ONE RIB OF RIGHT SIDE, INITIAL ENCOUNTER: ICD-10-CM

## 2025-01-31 NOTE — PROGRESS NOTES
Subjective   Patient ID: Andreia Camejo is a 89 y.o. female who presents for follow up ED visit - could not come into office today     HPI     Virtual or Telephone Consent    A telephone visit (audio only) between the patient (at the originating site) and the provider (at the distant site) was utilized to provide this telehealth service.   Verbal consent was requested and obtained from Andreia Camejo on this date, 02/02/25 for a telehealth visit.      Phone appt with pt today     LOV 4/2024  - since that time  -        July    - had a fall and a hip injury  - had to go to SNF     1/23/25 - back in ER -    Had a fall at home - hit her head and fx a rib  - 7th - right  -     Reviewed labs and CT -  generally stable     CT head without bleed    (Pt on Eliquis and Clopidogrel)       Today - pt states her pain is under reasonable control -   she has terrible bruising   - but its not getting worse -     She feels she is back to baseline   Breathing is stable           Chronic issues reviewed today:         Severe DDD of neck and low back -   In the past told PMR - but has not gone back to Rheum   On Gabapentin  300 mg  QID      Cardiac issues -    CAD/ HX of PCI   Pacemaker  Parox afib   Mild aortic stenosis on echo 1/2024   Sees DR Waters   Carvedilol 3.125 mg BID   Clopidogrel 75 mg a day   Eliquis 5mg BID   Rosuvastatin 10 mg a day      DMT 2 -   Not on any meds -   Blood sugars in the hospital -  128 - 235      Gets recurrent UTIs      CRF - Creatinine -  runs 1.01 - 1.49      MD - sees optho - gets inj regularly         Jeannebusdonita did medicare wellness  5/8/23           Lives alone -   DTR lives next door   Has aids to help         Vaccines  -    Cannot get flu shots  - allergic   Covid -  had the primary series   Pneumonia - had 13 and 23  - last one 2016   RSV            Review of Systems    Objective   There were no vitals taken for this visit.    Physical Exam    NAD -   Able to have conversation with me without  any issues     Assessment/Plan   Problem List Items Addressed This Visit    None  Visit Diagnoses         Codes    Fall, initial encounter    -  Primary W19.XXXA    Injury of head, initial encounter     S09.90XA    Closed fracture of one rib of right side, initial encounter     S22.31XA          Only able to do ER phone follow up  -   She seems to be doing well -     Discussed falls prevention   And to work on breathing to help prevent pneumonia   Tylenol for pain -     Call with any signs of worsening cognition or PNA     Make follow up office appt when weather better.     Time - 7 min

## 2025-04-15 ENCOUNTER — APPOINTMENT (OUTPATIENT)
Dept: PRIMARY CARE | Facility: CLINIC | Age: OVER 89
End: 2025-04-15
Payer: MEDICARE

## 2025-04-15 ENCOUNTER — HOSPITAL ENCOUNTER (OUTPATIENT)
Dept: RADIOLOGY | Facility: CLINIC | Age: OVER 89
Discharge: HOME | End: 2025-04-15
Payer: MEDICARE

## 2025-04-15 VITALS
SYSTOLIC BLOOD PRESSURE: 132 MMHG | HEIGHT: 68 IN | WEIGHT: 182 LBS | OXYGEN SATURATION: 98 % | DIASTOLIC BLOOD PRESSURE: 70 MMHG | HEART RATE: 66 BPM | BODY MASS INDEX: 27.58 KG/M2

## 2025-04-15 DIAGNOSIS — M79.674 GREAT TOE PAIN, RIGHT: ICD-10-CM

## 2025-04-15 DIAGNOSIS — N18.32 TYPE 2 DIABETES MELLITUS WITH STAGE 3B CHRONIC KIDNEY DISEASE, WITHOUT LONG-TERM CURRENT USE OF INSULIN (MULTI): ICD-10-CM

## 2025-04-15 DIAGNOSIS — I25.10 CORONARY ARTERY DISEASE INVOLVING NATIVE CORONARY ARTERY OF NATIVE HEART WITHOUT ANGINA PECTORIS: ICD-10-CM

## 2025-04-15 DIAGNOSIS — G62.89 OTHER POLYNEUROPATHY: ICD-10-CM

## 2025-04-15 DIAGNOSIS — I10 BENIGN ESSENTIAL HYPERTENSION: ICD-10-CM

## 2025-04-15 DIAGNOSIS — I48.0 PAROXYSMAL ATRIAL FIBRILLATION (MULTI): ICD-10-CM

## 2025-04-15 DIAGNOSIS — M47.816 OSTEOARTHRITIS OF LUMBAR SPINE, UNSPECIFIED SPINAL OSTEOARTHRITIS COMPLICATION STATUS: ICD-10-CM

## 2025-04-15 DIAGNOSIS — Z00.00 ROUTINE GENERAL MEDICAL EXAMINATION AT HEALTH CARE FACILITY: Primary | ICD-10-CM

## 2025-04-15 DIAGNOSIS — E11.22 TYPE 2 DIABETES MELLITUS WITH STAGE 3B CHRONIC KIDNEY DISEASE, WITHOUT LONG-TERM CURRENT USE OF INSULIN (MULTI): ICD-10-CM

## 2025-04-15 PROCEDURE — 3078F DIAST BP <80 MM HG: CPT | Performed by: FAMILY MEDICINE

## 2025-04-15 PROCEDURE — G0439 PPPS, SUBSEQ VISIT: HCPCS | Performed by: FAMILY MEDICINE

## 2025-04-15 PROCEDURE — 99214 OFFICE O/P EST MOD 30 MIN: CPT | Performed by: FAMILY MEDICINE

## 2025-04-15 PROCEDURE — 1159F MED LIST DOCD IN RCRD: CPT | Performed by: FAMILY MEDICINE

## 2025-04-15 PROCEDURE — 73630 X-RAY EXAM OF FOOT: CPT | Mod: RIGHT SIDE

## 2025-04-15 PROCEDURE — 1160F RVW MEDS BY RX/DR IN RCRD: CPT | Performed by: FAMILY MEDICINE

## 2025-04-15 PROCEDURE — 1157F ADVNC CARE PLAN IN RCRD: CPT | Performed by: FAMILY MEDICINE

## 2025-04-15 PROCEDURE — 3075F SYST BP GE 130 - 139MM HG: CPT | Performed by: FAMILY MEDICINE

## 2025-04-15 PROCEDURE — 1170F FXNL STATUS ASSESSED: CPT | Performed by: FAMILY MEDICINE

## 2025-04-15 PROCEDURE — 73630 X-RAY EXAM OF FOOT: CPT | Mod: RT

## 2025-04-15 ASSESSMENT — ACTIVITIES OF DAILY LIVING (ADL)
MANAGING_FINANCES: NEEDS ASSISTANCE
BATHING: NEEDS ASSISTANCE
DRESSING: INDEPENDENT
TAKING_MEDICATION: INDEPENDENT
GROCERY_SHOPPING: NEEDS ASSISTANCE
DOING_HOUSEWORK: INDEPENDENT

## 2025-04-15 ASSESSMENT — ENCOUNTER SYMPTOMS
LOSS OF SENSATION IN FEET: 0
DEPRESSION: 0
OCCASIONAL FEELINGS OF UNSTEADINESS: 1

## 2025-04-15 NOTE — PATIENT INSTRUCTIONS
"I will call about xray once its read       Keep vaseline or aquaphor on the rough spot on your cheek -   Follow up with derm       Try simply saline for your sinuses - as much as you want.         Vaccines to to think about  -     Prevnar 20   RSV   Shingrix   Can get at pharmacy       I can see you in 6 months if all is stable       ABOUT MEDICARE PREVENTATIVE APPOINTMENTS:     YOUR YEARLY MEDICARE WELLNESS VISIT IS VERY IMPORTANT.      Medicare wants all of their patients to schedule their \"Welcome to Medicare\" visit  when you are in the first 12 months of being on Medicare.    Then every year after that, they want you to schedule your  \"Annual Wellness\"  visit.     These visits have a very specific list of topics I have to cover at the visit,  so you will have paperwork you need to complete before I see you.   Of interest,  there is NOT actually a physical exam as part of this visit.       If you are female,   a Well Woman Exam  (Breast exam and pelvic exam) - are paid for by Medicare every 2 years.   Mammograms are paid for every year.    If you are due for this exam too,  the Medicare wellness and the well woman exam can be done on the same day,  PLEASE TELL THIS TO  WHEN MAKING THE APPOINTMENT.      Some of the Medicare plans ALSO cover a traditional \"physical\" - which has more of the physical exam component.   You may want to find out if your insurance covers that too.       (this is  the code - 77328)  - That can be added to the visit as well.    You would need to tell us.     IT'S VERY HELPFUL IF YOU KNOW WHAT YOUR PLAN COVERS AHEAD OF THE VISIT.      Please check to see what your plan(s) cover.   (Even checking on testing and vaccines that are covered or not helps us greatly!)     At these appointments ,  IF  we cover any of your chronic medical conditions,  medical concerns,  or medications,  I will also be billing riki  \"E/M  code\" which stands for \"Evaluation and Management\"    -  which is a " "regular office visit code.    THAT CHARGE MAY BE SUBJECT TO CO-PAYS AND DEDUCTIBLES.     PLEASE DO NOT \"SAVE\" ALL OF YOUR CONCERNS TO GO OVER AT THESE YEARLY WELLNESS VISITS.   YOU SHOULD BE SCHEDULING SEPARATE APPOINTMENTS WHEN YOU HAVE HEALTH CONCERNS TO DISCUSS AND FOR YOUR MEDICATION CHECK UP APPOINTMENTS.        Thank you.                Ways to Help Prevent Falls at Home    Quick Tips   ? Ask for help if you need it. Most people want to help!   ? Get up slowly after sitting or laying down   ? Wear a medical alert device or keep cell phone in your pocket   ? Use night lights, especially areas near a bathroom   ? Keep the items you use often within reach on a small stool or end table   ? Use an assistive device such as walker or cane, as directed by provider/physical therapy   ? Use a non-slip mat and grab bars in your bathroom. Look for home health sections for best options     Other Areas to Focus On   ? Exercise and nutrition: Regular exercise or taking a falls prevention class are great ways improve strength and balance. Don’t forget to stay hydrated and bring a snack!   ? Medicine side effects: Some medicines can make you sleepy or dizzy, which could cause a fall. Ask your healthcare provider about the side effects your medicines could cause. Be sure to let them know if you take any vitamins or supplements as well.   ? Tripping hazards: Remove items you could trip on, such as loose mats, rugs, cords, and clutter. Wear closed toe shoes with rubber soles.   ? Health and wellness: Get regular checkups with your healthcare provider, plus routine vision and hearing screenings. Talk with your healthcare provider about:   o Your medicines and the possible side effects - bring them in a bag if that is easier!   o Problems with balance or feeling dizzy   o Ways to promote bone health, such as Vitamin D and calcium supplements   o Questions or concerns about falling     *Ask your healthcare team if you have questions "     ©[facility], 2022

## 2025-04-15 NOTE — PROGRESS NOTES
Subjective   Patient ID: Andreia Camejo is a 89 y.o. female who presents for Medicare wellness and follow up     Past Medical, Surgical, and Family History reviewed and updated in chart.     Reviewed all medications by prescribing practitioner or clinical pharmacist (such as prescriptions, OTCs, herbal therapies and supplements) and documented in the medical record.       HPI     Last appt - January  - telemed - follow up ER  -         Fall and fx rib   LOV 4/2024            Updates and Concerns:     Here with DTR today     [facility] wellness today  - forms filled out and reviewed today       Seeing DERM -   Last there in the fall   Rough patch on left cheek - she states it has been there a while - burns at times       Runny nose  - chronic   Using [facility]   Uses flonase daily       Right great toe pain for some time   Hurts worse at night         Chronic issues reviewed today:      Severe DDD of neck and low back -   In the past told PMR - but has not gone back to Rheumatology   On Gabapentin  300 mg  QID  - tolerates well      Cardiac issues -    CAD - HX of PCI   Pacemaker  Parox afib   Mild aortic stenosis on echo 1/2024     Cardiologist - Sees DR Waters - had appt March     Meds -   Carvedilol 3.125 mg BID   Clopidogrel 75 mg a day   Eliquis 5mg BID   Rosuvastatin 10 mg a day      DMT 2 -   Not on any meds -   Blood sugars in the hospital -  128 - 235      Gets recurrent UTIs      CRF - Creatinine -  runs 1.01 - 1.49      Macular Degen  - sees optho - gets inj regularly     July 2024   - hip pain   - had to go to SNF   Saw Jcarlos  - inj helped         [facility] -   [facility] wellness today   4/15/25      Lives alone -   DTR lives next door   Has aids to help     Age precludes most preventative testing -     DEXA - declines        Vaccines  -    Cannot get flu shots  - allergic   Covid -  had the primary series   Pneumonia - had 13 and 23  - last one 2016    - declines Prevnar 20 today   Shingrix  - not done   RSV  - not done  "    ADV Dir -    DNAR  -   Living will and medical poa not on chart        Review of Systems    Objective   /70   Pulse 66   Ht 1.727 m (5' 8\")   Wt 82.6 kg (182 lb)   SpO2 98%   BMI 27.67 kg/m²     Physical Exam  Vitals reviewed.   Constitutional:       General: She is not in acute distress.     Appearance: She is obese.      Comments: Using walker    HENT:      Head: Normocephalic and atraumatic.      Right Ear: Tympanic membrane and ear canal normal.      Left Ear: Tympanic membrane and ear canal normal.      Nose: Rhinorrhea present. No congestion.      Comments: Pale nasal mucosa      Mouth/Throat:      Mouth: Mucous membranes are moist.      Pharynx: No posterior oropharyngeal erythema.   Eyes:      Extraocular Movements: Extraocular movements intact.      Conjunctiva/sclera: Conjunctivae normal.      Pupils: Pupils are equal, round, and reactive to light.   Cardiovascular:      Rate and Rhythm: Normal rate. Rhythm irregular.      Heart sounds: Murmur heard.   Pulmonary:      Effort: Pulmonary effort is normal. No respiratory distress.      Breath sounds: Normal breath sounds. No wheezing.   Musculoskeletal:      Cervical back: No rigidity.      Comments: Right great toe very tender at IP joint - no erythema    Lymphadenopathy:      Cervical: No cervical adenopathy.   Skin:     General: Skin is warm and dry.      Findings: No rash.      Comments: Patch of skin about 1.5 com - erythematous , flat and rough    Neurological:      Mental Status: She is alert. Mental status is at baseline.   Psychiatric:         Mood and Affect: Mood normal.         Thought Content: Thought content normal.             Assessment & Plan  Routine general medical examination at health care facility    [facility]CR wellness today -   Age precludes preventative testing and she declined DEXA today and vaccines     Great toe pain, right    Orders:    XR foot right 3+ views; Future    Benign essential hypertension    Stable   Coronary " artery disease involving native coronary artery of native heart without angina pectoris    Stable - sees cardiology yearly   Osteoarthritis of lumbar spine, unspecified spinal osteoarthritis complication status         Paroxysmal atrial fibrillation (Multi)         Other polyneuropathy         Type 2 diabetes mellitus with stage 3b chronic kidney disease, without long-term current use of insulin (Multi)    Diet controlled      Skin lesion  - to get back to derm     Rhinitis - to try saline often     Education on vaccines to think about      Patient was identified as a fall risk. Risk prevention instructions provided.    Appt can be in 6 mos     We discussed at visit any disease processes that were of concern as well as the risks, benefits and instructions of any new medication provided.    See orders and discussion section for information provided to patient in their After Visit Summary.   Patient (and/or caretaker of patient if present)  stated all questions were answered, and they voiced understanding of instructions.

## 2025-04-16 PROBLEM — M25.552 HIP PAIN, ACUTE, LEFT: Status: RESOLVED | Noted: 2024-07-06 | Resolved: 2025-04-16

## 2025-04-16 PROBLEM — M25.552 ACUTE HIP PAIN, LEFT: Status: RESOLVED | Noted: 2024-07-06 | Resolved: 2025-04-16

## 2025-05-14 DIAGNOSIS — I25.10 CORONARY ARTERY DISEASE INVOLVING NATIVE CORONARY ARTERY OF NATIVE HEART WITHOUT ANGINA PECTORIS: ICD-10-CM

## 2025-05-14 RX ORDER — ROSUVASTATIN CALCIUM 10 MG/1
10 TABLET, COATED ORAL NIGHTLY
Qty: 90 TABLET | Refills: 3 | Status: SHIPPED | OUTPATIENT
Start: 2025-05-14

## 2025-06-03 DIAGNOSIS — I25.10 CORONARY ARTERY DISEASE INVOLVING NATIVE CORONARY ARTERY OF NATIVE HEART WITHOUT ANGINA PECTORIS: ICD-10-CM

## 2025-06-03 RX ORDER — CLOPIDOGREL BISULFATE 75 MG/1
75 TABLET ORAL DAILY
Qty: 90 TABLET | Refills: 3 | Status: SHIPPED | OUTPATIENT
Start: 2025-06-03

## 2025-07-10 DIAGNOSIS — I48.0 PAROXYSMAL ATRIAL FIBRILLATION (MULTI): ICD-10-CM

## 2025-07-10 RX ORDER — APIXABAN 5 MG/1
5 TABLET, FILM COATED ORAL EVERY 12 HOURS
Qty: 180 TABLET | Refills: 3 | Status: SHIPPED | OUTPATIENT
Start: 2025-07-10

## 2025-07-17 DIAGNOSIS — M54.12 CERVICAL RADICULAR PAIN: ICD-10-CM

## 2025-07-17 DIAGNOSIS — G60.9 IDIOPATHIC PERIPHERAL NEUROPATHY: ICD-10-CM

## 2025-07-17 RX ORDER — GABAPENTIN 300 MG/1
CAPSULE ORAL
Qty: 360 CAPSULE | Refills: 1 | Status: SHIPPED | OUTPATIENT
Start: 2025-07-17

## 2025-08-28 DIAGNOSIS — I25.10 CORONARY ARTERY DISEASE INVOLVING NATIVE CORONARY ARTERY OF NATIVE HEART WITHOUT ANGINA PECTORIS: ICD-10-CM

## 2025-08-28 RX ORDER — CARVEDILOL 3.12 MG/1
3.12 TABLET ORAL
Qty: 180 TABLET | Refills: 0 | Status: SHIPPED | OUTPATIENT
Start: 2025-08-28

## 2025-08-29 ENCOUNTER — TELEPHONE (OUTPATIENT)
Dept: PRIMARY CARE | Facility: CLINIC | Age: OVER 89
End: 2025-08-29

## 2025-08-29 ENCOUNTER — CLINICAL SUPPORT (OUTPATIENT)
Dept: PRIMARY CARE | Facility: CLINIC | Age: OVER 89
End: 2025-08-29
Payer: MEDICARE

## 2025-08-29 DIAGNOSIS — R39.9 UTI SYMPTOMS: Primary | ICD-10-CM

## 2025-09-05 ENCOUNTER — TELEPHONE (OUTPATIENT)
Dept: PRIMARY CARE | Facility: CLINIC | Age: OVER 89
End: 2025-09-05
Payer: MEDICARE

## 2025-09-05 DIAGNOSIS — I25.10 CORONARY ARTERY DISEASE INVOLVING NATIVE CORONARY ARTERY OF NATIVE HEART WITHOUT ANGINA PECTORIS: ICD-10-CM

## 2025-09-05 RX ORDER — CARVEDILOL 3.12 MG/1
3.12 TABLET ORAL
Qty: 60 TABLET | Refills: 0 | Status: SHIPPED | OUTPATIENT
Start: 2025-09-05

## 2025-10-21 ENCOUNTER — APPOINTMENT (OUTPATIENT)
Dept: PRIMARY CARE | Facility: CLINIC | Age: OVER 89
End: 2025-10-21
Payer: MEDICARE